# Patient Record
Sex: FEMALE | Race: WHITE | Employment: OTHER | URBAN - METROPOLITAN AREA
[De-identification: names, ages, dates, MRNs, and addresses within clinical notes are randomized per-mention and may not be internally consistent; named-entity substitution may affect disease eponyms.]

---

## 2016-12-05 LAB
ABS BASOPHILS: 0 CELLS/MM3
ABS EOSINOPHILS: 0.4 CELLS/MM3
ABS LYMPHOCYTES: 2.3 CELLS/MM3
ABS MONOCYTE: 0.7 CELLS/MM3
ABS NEUTROPHILS: 2.9 CELLS/MM3
ALBUMIN SERPL-MCNC: 3.2 G/DL
ALP SERPL-CCNC: 143 U/L
ALT SERPL-CCNC: 15 U/L
ANION GAP SERPL CALCULATED.3IONS-SCNC: 8 MMOL/L
AST SERPL-CCNC: 22 U/L
BASOPHILS NFR BLD AUTO: 0.6 %
BILIRUB SERPL-MCNC: 0.6 MG/DL
BUN SERPL-MCNC: 30 MG/DL
CALCIUM SERPL-MCNC: 9.2 MG/DL
CHLORIDE SERPLBLD-SCNC: 106 MMOL/L
CO2 SERPL-SCNC: 27 MMOL/L
CREAT SERPL-MCNC: 0.76 MG/DL
EOSINOPHIL NFR BLD AUTO: 6 %
ERYTHROCYTE [DISTWIDTH] IN BLOOD BY AUTOMATED COUNT: 13.8 %
GFR SERPL CREATININE-BSD FRML MDRD: 72 ML/MIN/1.73M2
GLUCOSE SERPL-MCNC: 78 MG/DL (ref 70–99)
HCT VFR BLD AUTO: 37.8 %
HEMOGLOBIN: 12.7 G/DL (ref 11.7–15.7)
LYMPHOCYTES NFR BLD AUTO: 36.3 %
MCH RBC QN AUTO: 30.7 PG
MCHC RBC AUTO-ENTMCNC: 33.6 G/DL
MCV RBC AUTO: 91 FL
MONOCYTES NFR BLD AUTO: 10.8 %
NEUTROPHILS NFR BLD AUTO: 46 %
PLATELET COUNT - QUEST: 238 10^9/L (ref 150–450)
POTASSIUM SERPL-SCNC: 4 MMOL/L
PROT SERPL-MCNC: 6.5 G/DL
RBC # BLD AUTO: 4.14 10^12/L
SODIUM SERPL-SCNC: 141 MMOL/L
VIT B12 SERPL-MCNC: 482 PG/ML
WBC # BLD AUTO: 6.3 10^9/L

## 2017-01-04 ENCOUNTER — DOCUMENTATION ONLY (OUTPATIENT)
Dept: OTHER | Facility: CLINIC | Age: 82
End: 2017-01-04

## 2017-01-04 DIAGNOSIS — Z71.89 ADVANCE CARE PLANNING: Primary | Chronic | ICD-10-CM

## 2017-01-09 ENCOUNTER — TRANSFERRED RECORDS (OUTPATIENT)
Dept: HEALTH INFORMATION MANAGEMENT | Facility: CLINIC | Age: 82
End: 2017-01-09

## 2017-01-10 ENCOUNTER — ASSISTED LIVING VISIT (OUTPATIENT)
Dept: GERIATRICS | Facility: CLINIC | Age: 82
End: 2017-01-10
Payer: MEDICARE

## 2017-01-10 VITALS
DIASTOLIC BLOOD PRESSURE: 82 MMHG | RESPIRATION RATE: 18 BRPM | BODY MASS INDEX: 19.51 KG/M2 | TEMPERATURE: 96.4 F | SYSTOLIC BLOOD PRESSURE: 138 MMHG | HEIGHT: 62 IN | HEART RATE: 83 BPM | WEIGHT: 106 LBS

## 2017-01-10 DIAGNOSIS — S72.091A: Primary | ICD-10-CM

## 2017-01-10 DIAGNOSIS — F03.C0 SEVERE DEMENTIA (H): ICD-10-CM

## 2017-01-10 DIAGNOSIS — M25.551 HIP PAIN, RIGHT: ICD-10-CM

## 2017-01-10 DIAGNOSIS — S32.9XXA PELVIC FRACTURE (H): ICD-10-CM

## 2017-01-10 PROCEDURE — 99207 ZZC CDG-DX INCORRECT: CPT | Performed by: NURSE PRACTITIONER

## 2017-01-10 NOTE — PROGRESS NOTES
[facility]    Chief Complaint   Patient presents with     Fracture       HPI:    April Donnelly is a 86 year old  (1/30/1930), who is being seen today for an episodic care visit at [facility]. Today's concern is:  1. Comminuted fracture of hip, right, closed, initial encounter (H)    2. Pelvic fracture (H)    3. Hip pain, right    4. Severe dementia    April is a frail 87 y/o woman who scooted out of her wheelchair a few days ago landing on her bottom.  She did not fall over, she fell in a sitting position.  She has had a non-displaced right pelvic fx for over a month and has not been able to walk due to her fear of falling.  Today she called out in pain during her transfer which up until today this has not been the problem.       ALLERGIES: Hydrocodone; Lisinopril; Metoprolol; and Terazosin  Past Medical, Surgical, Family and Social History reviewed and updated in [facility].    Current Outpatient Prescriptions   Medication Sig Dispense Refill     traMADol (ULTRAM) 50 MG tablet Take 50 mg by mouth At Bedtime       aspirin 81 MG EC tablet Take 1 tablet (81 mg) by mouth daily 31 tablet PRN     atenolol (TENORMIN) 50 MG tablet Take 1 tablet (50 mg) by mouth daily 31 tablet PRN     levothyroxine (LEVOTHROID) 75 MCG tablet Take 1 tablet (75 mcg) by mouth daily 31 tablet PRN     Citalopram Hydrobromide (CELEXA PO) Take 10 mg by mouth daily       LOSARTAN POTASSIUM PO Take 50 mg by mouth daily       Acetaminophen (TYLENOL PO) Take 650 mg by mouth every 6 hours as needed for mild pain or fever       traMADol (ULTRAM) 50 MG tablet Take 1 tablet (50 mg) by mouth every 6 hours as needed for pain 20 tablet 0     Medications reviewed:  Medications reconciled to facility chart and changes were made to reflect current medications as identified as above med list. Below are the changes that were made:   Medications stopped since last EPIC medication reconciliation:   There are no discontinued medications.    Medications  "started since last EPIC medication reconciliation:  No orders of the defined types were placed in this encounter.      REVIEW OF SYSTEMS:  Unobtainable secondary to cognitive impairment but today pt reports \"I'm feeling pretty good today.\"    Physical Exam:  /82 mmHg  Pulse 83  Temp(Src) 96.4  F (35.8  C)  Resp 18  Ht 5' 2\" (1.575 m)  Wt 106 lb (48.081 kg)  BMI 19.38 kg/m2  GENERAL APPEARANCE:  Alert, in no distress  RESP:  respiratory effort and palpation of chest normal, lungs clear to auscultation , no respiratory distress  CV:  Palpation and auscultation of heart done , regular rate and rhythm, no murmur, rub, or gallop  ABDOMEN:  normal bowel sounds, soft, nontender, no hepatosplenomegaly or other masses  M/S:   Gait and station abnormal wheelchair, unable to bear weight on the right  Digits and nails abnormal hypertrophic  Examination of:   right upper extremity, left upper extremity, right lower extremity and left lower extremity  Inspection, ROM, stability and muscle strength all large joint limited ROM due to arthritic changes, right hip limited by pain.  PSYCH:  insight and judgement impaired, memory impaired     Recent Labs:      Last Basic Metabolic Panel:  NA      141   12/5/2016   POTASSIUM      4.0   12/5/2016  CHLORIDE      106   12/5/2016  BOLA      9.2   12/5/2016  CO2       27   12/5/2016  BUN       30   12/5/2016  CR     0.76   12/5/2016  GLC       78   12/5/2016    WBC      6.3   12/5/2016  RBC     4.14   12/5/2016  HGB     12.7   12/5/2016  HCT     37.8   12/5/2016  MCV       91   12/5/2016  MCH     30.7   12/5/2016  MCHC     33.6   12/5/2016  RDW     13.8   12/5/2016  PLT      238   12/5/2016      Vitamin B12   482  12/5/16      Assessment/Plan:  (S72.091A) Comminuted fracture of hip, right, closed, initial encounter (H)  (primary encounter diagnosis)  (S32.9XXA) Pelvic fracture (H)  (M25.551) Hip pain, right  Comment: reviewed xrays from a couple of months ago to this most recent " one [place] has deteriorated   Plan: non-weight bearing, will add Tramadol for pain management.      (F03.90) Severe dementia  Comment: progressive, verbal deficit noted  Plan: DNR/DNI        Remains appropriate for a memory care [facility]    Time  25 min for visit and review of xrays    Electronically signed by  HIGINIO Tilley CNP

## 2017-01-11 RX ORDER — TRAMADOL HYDROCHLORIDE 50 MG/1
50 TABLET ORAL AT BEDTIME
COMMUNITY
Start: 2017-01-10 | End: 2017-01-24

## 2017-01-23 ENCOUNTER — ASSISTED LIVING VISIT (OUTPATIENT)
Dept: FAMILY MEDICINE | Facility: CLINIC | Age: 82
End: 2017-01-23
Payer: MEDICARE

## 2017-01-23 VITALS
RESPIRATION RATE: 18 BRPM | DIASTOLIC BLOOD PRESSURE: 82 MMHG | SYSTOLIC BLOOD PRESSURE: 137 MMHG | HEART RATE: 76 BPM | BODY MASS INDEX: 18.99 KG/M2 | HEIGHT: 62 IN | WEIGHT: 103.2 LBS

## 2017-01-23 DIAGNOSIS — S32.9XXD CLOSED NONDISPLACED FRACTURE OF PELVIS WITH ROUTINE HEALING, UNSPECIFIED PART OF PELVIS, SUBSEQUENT ENCOUNTER: ICD-10-CM

## 2017-01-23 DIAGNOSIS — F03.C0 SEVERE DEMENTIA (H): ICD-10-CM

## 2017-01-23 DIAGNOSIS — E03.9 HYPOTHYROIDISM, UNSPECIFIED TYPE: ICD-10-CM

## 2017-01-23 DIAGNOSIS — I63.9 CEREBROVASCULAR ACCIDENT (CVA), UNSPECIFIED MECHANISM (H): Primary | ICD-10-CM

## 2017-03-13 ENCOUNTER — ASSISTED LIVING VISIT (OUTPATIENT)
Dept: GERIATRICS | Facility: CLINIC | Age: 82
End: 2017-03-13
Payer: MEDICARE

## 2017-03-13 VITALS
SYSTOLIC BLOOD PRESSURE: 144 MMHG | HEART RATE: 61 BPM | HEIGHT: 62 IN | WEIGHT: 105 LBS | RESPIRATION RATE: 20 BRPM | DIASTOLIC BLOOD PRESSURE: 86 MMHG | TEMPERATURE: 94.8 F | BODY MASS INDEX: 19.32 KG/M2

## 2017-03-13 DIAGNOSIS — I61.9 HEMORRHAGIC STROKE (H): ICD-10-CM

## 2017-03-13 DIAGNOSIS — R55 SYNCOPE, UNSPECIFIED SYNCOPE TYPE: ICD-10-CM

## 2017-03-13 DIAGNOSIS — E03.9 ACQUIRED HYPOTHYROIDISM: ICD-10-CM

## 2017-03-13 DIAGNOSIS — F43.23 ADJUSTMENT DISORDER WITH MIXED ANXIETY AND DEPRESSED MOOD: ICD-10-CM

## 2017-03-13 DIAGNOSIS — I63.9 CEREBROVASCULAR ACCIDENT (CVA), UNSPECIFIED MECHANISM (H): Primary | ICD-10-CM

## 2017-03-13 DIAGNOSIS — F01.50 VASCULAR DEMENTIA WITHOUT BEHAVIORAL DISTURBANCE (H): ICD-10-CM

## 2017-03-13 NOTE — PROGRESS NOTES
[facility]    Chief Complaint   Patient presents with     Chronic Disease Management       HPI:    April Donnelly is a 87 year old  (1/30/1930), who is being seen today for an episodic care visit at [facility]. Today's concern is:    Cerebrovascular accident (CVA), unspecified mechanism (H)  Hemorrhagic stroke (H)  Syncope, unspecified syncope type  No recent falls reported but has had multiple falls since admissions to this facility in 11/2016  and a history of syncope. She has ongoing weakness and poor spatial planning. Today she was seen sitting in her wheelchair, she denies pain and is interested in conversation. Speech at baseline is slurred with run on sentence pattern.    Adjustment disorder with mixed anxiety and depressed mood  Ongoing with history of CVA. Current management includes Celexa 10 mg daily.    Severe Vascular dementia  Admitted to this facility from [facility] for progressing disease and medical management. Today she was pleasant and calm. Conservation is nonsensical at times.   a  Hypothyroidism, acquired    Goiter requiring surgery 30 years ago. Levothyroxine 75 mcg daily.       ALLERGIES: Hydrocodone; Lisinopril; Metoprolol; and Terazosin  Past Medical, Surgical, Family and Social History reviewed and updated in [facility].    Current Outpatient Prescriptions   Medication Sig Dispense Refill     aspirin 81 MG EC tablet Take 1 tablet (81 mg) by mouth daily 31 tablet PRN     atenolol (TENORMIN) 50 MG tablet Take 1 tablet (50 mg) by mouth daily 31 tablet PRN     levothyroxine (LEVOTHROID) 75 MCG tablet Take 1 tablet (75 mcg) by mouth daily 31 tablet PRN     Citalopram Hydrobromide (CELEXA PO) Take 10 mg by mouth daily       LOSARTAN POTASSIUM PO Take 50 mg by mouth daily       Acetaminophen (TYLENOL PO) Take 650 mg by mouth every 6 hours as needed for mild pain or fever       traMADol (ULTRAM) 50 MG tablet Take 1 tablet (50 mg) by mouth every 6 hours as needed for  "pain 20 tablet 0     Patient Active Problem List   Diagnosis     Cerebrovascular accident (H)     Hemorrhagic stroke (H)     Syncope     Adjustment disorder with mixed anxiety and depressed mood     Hypertensive heart disease without heart failure     Advance care planning     Closed nondisplaced fracture of pelvis with routine healing, unspecified part of pelvis, subsequent encounter     Vascular dementia without behavioral disturbance     Acquired hypothyroidism     Medications reviewed: yes  Medications reconciled to facility chart and changes were made to reflect current medications as identified as above med list. Below are the changes that were made: none  Medications stopped since last EPIC medication reconciliation: none  There are no discontinued medications.    Medications started since last [facility] medication reconciliation:  No orders of the defined types were placed in this encounter.    REVIEW OF SYSTEMS:  Unobtainable secondary to cognitive impairment or aphasia.    Physical Exam:  /86  Pulse 61  Temp 94.8  F (34.9  C)  Resp 20  Ht 5' 2\" (1.575 m)  Wt 105 lb (47.6 kg)  BMI 19.2 kg/m2  GENERAL APPEARANCE:  Alert, in no distress, Able to communicate but word finding difficultly   ENT:  Mouth and posterior oropharynx normal, moist mucous membranes  EYES:  EOM, conjunctivae, lids, pupils and irises normal  NECK:  No adenopathy,masses or thyromegaly  RESP:  respiratory effort and palpation of chest normal, lungs clear to auscultation   CV:  Palpation and auscultation of heart done , regular rate and rhythm, no murmur, rub, or gallop, no edema  ABDOMEN:  normal bowel sounds, soft, nontender, no hepatosplenomegaly or other masses  M/S:   Gait and station abnormal wheelchair is baseline. Left had closed fist able to open and move. Gait and station normal with wheelchair at baseline  SKIN:  Inspection of skin and subcutaneous tissue baseline  NEURO:   Cranial nerves 2-12 are normal tested and grossly at " patient's baseline  PSYCH:  insight and judgement impaired, affect and mood normal    Recent Labs:      Last Basic Metabolic Panel:  Lab Results   Component Value Date     12/05/2016      Lab Results   Component Value Date    POTASSIUM 4.0 12/05/2016     Lab Results   Component Value Date    CHLORIDE 106 12/05/2016     Lab Results   Component Value Date    BOLA 9.2 12/05/2016     Lab Results   Component Value Date    CO2 27 12/05/2016     Lab Results   Component Value Date    BUN 30 12/05/2016     Lab Results   Component Value Date    CR 0.76 12/05/2016     Lab Results   Component Value Date    GLC 78 12/05/2016     Lab Results   Component Value Date    WBC 6.3 12/05/2016     Lab Results   Component Value Date    RBC 4.14 12/05/2016     Lab Results   Component Value Date    HGB 12.7 12/05/2016     Lab Results   Component Value Date    HCT 37.8 12/05/2016     Lab Results   Component Value Date    MCV 91 12/05/2016     Lab Results   Component Value Date    MCH 30.7 12/05/2016     Lab Results   Component Value Date    MCHC 33.6 12/05/2016     Lab Results   Component Value Date    RDW 13.8 12/05/2016     Lab Results   Component Value Date     12/05/2016     Assessment/Plan:  (I63.9) Cerebrovascular accident (CVA), unspecified mechanism (H)  (primary encounter diagnosis)  (I61.9) Hemorrhagic stroke (H)  (R55) Syncope, unspecified syncope type  Comment: Chronic   Plan: Continue with ASA, Atenolol and Losartan medication management. Continue to monitor labs per facility or additionally with acute illness or change in condition.    (F43.23) Adjustment disorder with mixed anxiety and depressed mood  Comment: Ongoing and Chronic  Plan: Continue Celexa 10 mg daily.    (F03.90) Severe Vascular Dementia without behavior  Comment: Chronic   Plan: Continue supportive care. Monitor for changes in mood and behavior.    (E03.9) Hypothyroidism, unspecified type  Comment: Chronic  Plan: Continue current plan of care.  Levothyroxine 75 mg daily. Monitor annually -  TSH.      Electronically signed by  HIGINIO Tilley CNP, NP Intern

## 2017-04-21 ENCOUNTER — ASSISTED LIVING VISIT (OUTPATIENT)
Dept: GERIATRICS | Facility: CLINIC | Age: 82
End: 2017-04-21
Payer: MEDICARE

## 2017-04-21 VITALS
BODY MASS INDEX: 19.56 KG/M2 | TEMPERATURE: 97.6 F | HEIGHT: 62 IN | SYSTOLIC BLOOD PRESSURE: 107 MMHG | DIASTOLIC BLOOD PRESSURE: 58 MMHG | RESPIRATION RATE: 15 BRPM | WEIGHT: 106.3 LBS | HEART RATE: 60 BPM

## 2017-04-21 DIAGNOSIS — I63.9 CEREBROVASCULAR ACCIDENT (CVA), UNSPECIFIED MECHANISM (H): Primary | ICD-10-CM

## 2017-04-21 DIAGNOSIS — E03.9 ACQUIRED HYPOTHYROIDISM: ICD-10-CM

## 2017-04-21 DIAGNOSIS — I61.9 HEMORRHAGIC STROKE (H): ICD-10-CM

## 2017-04-21 DIAGNOSIS — F01.50 VASCULAR DEMENTIA WITHOUT BEHAVIORAL DISTURBANCE (H): ICD-10-CM

## 2017-04-21 DIAGNOSIS — F43.23 ADJUSTMENT DISORDER WITH MIXED ANXIETY AND DEPRESSED MOOD: ICD-10-CM

## 2017-04-21 DIAGNOSIS — I11.9 HYPERTENSIVE HEART DISEASE WITHOUT HEART FAILURE: ICD-10-CM

## 2017-04-21 PROCEDURE — 99207 ZZC CDG-CORRECTLY CODED, REVIEWED AND AGREE: CPT | Performed by: NURSE PRACTITIONER

## 2017-04-21 NOTE — PROGRESS NOTES
[facility]    Chief Complaint   Patient presents with     RECHECK       HPI:    April Donnelly is a 87 year old  (1/30/1930), who is being seen today for an episodic care visit at [facility]. Today's concern is:     Cerebrovascular accident (CVA), unspecified mechanism (H)  Hemorrhagic stroke (H)  Has one fall in March on 3/11/17. Has ongoing weakness and poor spatial planning.  See today sitting in her wheelchair. Smiling and pleasant during visit. ASA 81 mg po daily.    Hypertensive heart disease without heart failure  BP ranges -107 DBP 95-86. Denies chest pain, shortness of breath, lightheadedness but  not an accurate historian. Cozaar 50 mg po daily and Atenolol 50 mg po daily.    Adjustment disorder with mixed anxiety and depressed mood  Celexa 10 mg po daily and no reports of issues from nursing.     Vascular dementia without behavioral disturbance  Nonsensical conversation today but happy and interested in visiting.    Acquired hypothyroidism  Levothroid 75 mcg po daily. Goiter requiring surgery 30 years ago.     ALLERGIES: Hydrocodone; Lisinopril; Metoprolol; and Terazosin  Past Medical, Surgical, Family and Social History reviewed and updated in [facility].    Current Outpatient Prescriptions   Medication Sig Dispense Refill     aspirin 81 MG EC tablet Take 1 tablet (81 mg) by mouth daily 31 tablet PRN     atenolol (TENORMIN) 50 MG tablet Take 1 tablet (50 mg) by mouth daily 31 tablet PRN     levothyroxine (LEVOTHROID) 75 MCG tablet Take 1 tablet (75 mcg) by mouth daily 31 tablet PRN     Citalopram Hydrobromide (CELEXA PO) Take 10 mg by mouth daily       LOSARTAN POTASSIUM PO Take 50 mg by mouth daily       Acetaminophen (TYLENOL PO) Take 650 mg by mouth every 6 hours as needed for mild pain or fever       traMADol (ULTRAM) 50 MG tablet Take 1 tablet (50 mg) by mouth every 6 hours as needed for pain 20 tablet 0     Medications reviewed:  Medications reconciled to facility chart and changes  "were made to reflect current medications as identified as above med list. Below are the changes that were made:   Medications stopped since last EPIC medication reconciliation:   There are no discontinued medications.    Medications started since last [facility] medication reconciliation:  No orders of the defined types were placed in this encounter.    REVIEW OF SYSTEMS:  Unobtainable secondary to cognitive impairment or aphasia, but today pt reports feeling good    Physical Exam:  /58  Pulse 60  Temp 97.6  F (36.4  C)  Resp 15  Ht 5' 2\" (1.575 m)  Wt 106 lb 4.8 oz (48.2 kg)  BMI 19.44 kg/m2  GENERAL APPEARANCE:  Alert, in no distress  ENT:  Mouth and posterior oropharynx normal, moist mucous membranes, [place]  EYES:  EOM, conjunctivae, lids, pupils and irises normal, EOM normal, conjunctiva and lids normal  NECK:  No adenopathy,masses or thyromegaly  RESP:  respiratory effort and palpation of chest normal, lungs clear to auscultation , no respiratory distress but diminished throughout  CV:  Palpation and auscultation of heart done , regular rate, rub, or gallop, grade 2/6 murmur  ABDOMEN:  normal bowel sounds, soft, nontender, no hepatosplenomegaly or other masses  M/S:   Gait and station abnormal -wheelchair is baseline  SKIN:  Inspection of skin and subcutaneous tissue baseline, Palpation of skin and subcutaneous tissue baseline  NEURO:   Cranial nerves 2-12 are normal tested and grossly at patient's baseline  PSYCH: pleasant and calm    Recent Labs:      CBC RESULTS:   Recent Labs   Lab Test 12/05/16   WBC  6.3   RBC  4.14   HGB  12.7   HCT  37.8   MCV  91   MCH  30.7   MCHC  33.6   RDW  13.8   PLT  238       Last Basic Metabolic Panel:  Recent Labs   Lab Test 12/05/16   NA  141   POTASSIUM  4.0   CHLORIDE  106   BOLA  9.2   CO2  27   BUN  30   CR  0.76   GLC  78       Liver Function Studies -   Recent Labs   Lab Test 12/05/16   PROTTOTAL  6.5   ALBUMIN  3.2   BILITOTAL  0.6   ALKPHOS  143   AST  22   ALT  15 "     Assessment/Plan:  (I63.9) Cerebrovascular accident (CVA), unspecified mechanism (H)  (primary encounter diagnosis)  (I61.9) Hemorrhagic stroke (H)  Comment: Chronic  Plan: Continue with ASA, Atenolol and Losartan, monitor with labs annual and with acute illness.    (I11.9) Hypertensive heart disease without heart failure  Comment: Chronic  Plan: Continue with current plan of care. BMP, CBC and LFT     (F43.23) Adjustment disorder with mixed anxiety and depressed mood  Comment: Chronic but stable  Plan: Celexa 10 mg po daily. Monitor for mood changes    (F01.50) Vascular dementia without behavioral disturbance  Comment: Chronic and ongoing  Plan: Continue with supportive care. Memory care is appropriate.    (E03.9) Acquired hypothyroidism  Comment: Chronic but stable  Plan: TSH next lab day.      Electronically signed by  HIGINIO Pena CNP

## 2017-04-26 ENCOUNTER — TRANSFERRED RECORDS (OUTPATIENT)
Dept: HEALTH INFORMATION MANAGEMENT | Facility: CLINIC | Age: 82
End: 2017-04-26

## 2017-04-26 LAB
ALBUMIN SERPL-MCNC: 3.1 G/DL (ref 3.4–5)
ALP SERPL-CCNC: 123 U/L (ref 40–150)
ALT SERPL-CCNC: 18 U/L (ref 0–50)
ANION GAP SERPL CALCULATED.3IONS-SCNC: 7 MMOL/L (ref 3–14)
AST SERPL-CCNC: 22 U/L (ref 0–45)
BILIRUB SERPL-MCNC: 0.4 MG/DL (ref 0.2–1.3)
BUN SERPL-MCNC: 30 MG/DL (ref 7–30)
CALCIUM SERPL-MCNC: 9.1 MG/DL (ref 8.5–10.1)
CHLORIDE SERPLBLD-SCNC: 106 MMOL/L (ref 94–109)
CO2 SERPL-SCNC: 27 MMOL/L (ref 20–32)
CREAT SERPL-MCNC: 0.65 MG/DL (ref 0.52–1.04)
ERYTHROCYTE [DISTWIDTH] IN BLOOD BY AUTOMATED COUNT: 13.3 % (ref 10–15)
GFR SERPL CREATININE-BSD FRML MDRD: 86 ML/MIN/1.73M2
GLUCOSE SERPL-MCNC: 75 MG/DL (ref 70–99)
HCT VFR BLD AUTO: 36.9 % (ref 35–47)
HEMOGLOBIN: 12.1 G/DL (ref 11.7–15.7)
MCH RBC QN AUTO: 30.8 PG (ref 26.5–33)
MCHC RBC AUTO-ENTMCNC: 32.8 G/DL (ref 31.5–36.5)
MCV RBC AUTO: 94 FL (ref 78–100)
PLATELET # BLD AUTO: 243 10E9/L (ref 150–450)
POTASSIUM SERPL-SCNC: 4.1 MMOL/L (ref 3.4–5.3)
PROT SERPL-MCNC: 6.2 G/DL (ref 6.8–8.8)
RBC # BLD AUTO: 3.93 10E12/L (ref 3.8–5.2)
SODIUM SERPL-SCNC: 140 MMOL/L (ref 133–144)
TSH SERPL-ACNC: 1.89 MU/L (ref 0.4–4)
WBC # BLD AUTO: 5 10E9/L (ref 4–11)

## 2017-08-08 ENCOUNTER — ASSISTED LIVING VISIT (OUTPATIENT)
Dept: GERIATRICS | Facility: CLINIC | Age: 82
End: 2017-08-08
Payer: MEDICARE

## 2017-08-08 VITALS
TEMPERATURE: 97.8 F | DIASTOLIC BLOOD PRESSURE: 75 MMHG | OXYGEN SATURATION: 97 % | SYSTOLIC BLOOD PRESSURE: 110 MMHG | HEART RATE: 55 BPM | RESPIRATION RATE: 16 BRPM

## 2017-08-08 DIAGNOSIS — F01.50 VASCULAR DEMENTIA WITHOUT BEHAVIORAL DISTURBANCE (H): ICD-10-CM

## 2017-08-08 DIAGNOSIS — I11.9 HYPERTENSIVE HEART DISEASE WITHOUT HEART FAILURE: Primary | ICD-10-CM

## 2017-08-08 NOTE — PROGRESS NOTES
[place]    Chief Complaint   Patient presents with     RECHECK       HPI:    April Donnelly is a 87 year old  (1/30/1930), who is being seen today for an episodic care visit at [facility].  HPI information obtained from: facility chart records.Today's concern is:    Hypertensive heart disease without heart failure  Hx of elevated BP but last month and continuing today with lower heart rate and blood pressures. Unable or unwilling to report how she is feeling.    Vascular dementia without behavioral disturbance  Resides on memory care unit with advancing needs including assist at times with feeding and transfers.       ALLERGIES: Hydrocodone; Lisinopril; Metoprolol; and Terazosin  Past Medical, Surgical, Family and Social History reviewed and updated in [facility].    Current Outpatient Prescriptions   Medication Sig Dispense Refill     aspirin 81 MG EC tablet Take 1 tablet (81 mg) by mouth daily 31 tablet PRN     atenolol (TENORMIN) 50 MG tablet Take 1 tablet (50 mg) by mouth daily (Patient taking differently: Take 25 mg by mouth daily ) 31 tablet PRN     levothyroxine (LEVOTHROID) 75 MCG tablet Take 1 tablet (75 mcg) by mouth daily 31 tablet PRN     Citalopram Hydrobromide (CELEXA PO) Take 10 mg by mouth daily       LOSARTAN POTASSIUM PO Take 50 mg by mouth daily       Acetaminophen (TYLENOL PO) Take 650 mg by mouth every 6 hours as needed for mild pain or fever       traMADol (ULTRAM) 50 MG tablet Take 1 tablet (50 mg) by mouth every 6 hours as needed for pain 20 tablet 0     Patient Active Problem List   Diagnosis     Cerebrovascular accident (H)     Hemorrhagic stroke (H)     Syncope     Adjustment disorder with mixed anxiety and depressed mood     Hypertensive heart disease without heart failure     Advance care planning     Closed nondisplaced fracture of pelvis with routine healing, unspecified part of pelvis, subsequent encounter     Vascular dementia without behavioral disturbance      Acquired hypothyroidism       Medications reviewed:  Medications reconciled to facility chart and changes were made to reflect current medications as identified as above med list. Below are the changes that were made:   Medications stopped since last EPIC medication reconciliation:   There are no discontinued medications.    Medications started since last [facility] medication reconciliation:  No orders of the defined types were placed in this encounter.    REVIEW OF SYSTEMS:  Unobtainable secondary to cognitive impairment or aphasia.    Physical Exam:  /75  Pulse 55  Temp 97.8  F (36.6  C)  Resp 16  SpO2 97%  GENERAL APPEARANCE:  Alert, in no distress, sitting in wheelchair  ENT:  Mouth and posterior oropharynx normal, moist mucous membranes, [place] with hearing aid in right ear only  EYES:  EOM, conjunctivae, lids, pupils and irises normal, EOM normal, conjunctiva and lids normal  NECK:  No adenopathy,masses or thyromegaly  RESP:  respiratory effort and palpation of chest normal, lungs clear to auscultation , no respiratory distress but diminished throughout  CV:  Palpation and auscultation of heart done , regular rate, rub, or gallop, grade 2/6 murmur, no lower extremity edema  ABDOMEN:  normal bowel sounds, soft, nontender, no hepatosplenomegaly or other M/S:   Gait and station abnormal -wheelchair is baseline  SKIN:  Inspection of skin and subcutaneous tissue baseline, Palpation of skin and subcutaneous tissue baseline  NEURO:   Cranial nerves 2-12 are normal tested and grossly at patient's baseline  PSYCH: pleasant and calm     Recent Labs:    CBC RESULTS:   Recent Labs   Lab Test 04/26/17 12/05/16   WBC  5.0  6.3   RBC  3.93  4.14   HGB  12.1  12.7   HCT  36.9  37.8   MCV  94  91   MCH  30.8  30.7   MCHC  32.8  33.6   RDW  13.3  13.8   PLT  243  238       Last Basic Metabolic Panel:  Recent Labs   Lab Test 04/26/17 12/05/16   NA  140  141   POTASSIUM  4.1  4.0   CHLORIDE  106  106   BOLA  9.1  9.2   CO2   27  27   BUN  30  30   CR  0.65  0.76   GLC  75  78       Liver Function Studies -   Recent Labs   Lab Test 04/26/17 12/05/16   PROTTOTAL  6.2*  6.5   ALBUMIN  3.1*  3.2   BILITOTAL  0.4  0.6   ALKPHOS  123  143   AST  22  22   ALT  18  15       TSH   Date Value Ref Range Status   04/26/2017 1.89 0.40 - 4.00 mU/L Final       Assessment/Plan:  (I11.9) Hypertensive heart disease without heart failure  (primary encounter diagnosis)  Comment: Chronic but tightly controlled  Plan:   -Decrease Atenolol to 25 mg po daily  -VS with visits, per facility protocol and with acute changes    (F01.50) Vascular dementia without behavioral disturbance  Comment: Ongoing and advancing  Plan:   -Supportive care on memory unit  -Monitor as needs increase    Family Communication: Left voice mail message for son. Unable to reach but will ask nursing to update family on medication dose change.      Electronically signed by  HIGINIO Pena CNP

## 2017-08-29 ENCOUNTER — ASSISTED LIVING VISIT (OUTPATIENT)
Dept: GERIATRICS | Facility: CLINIC | Age: 82
End: 2017-08-29
Payer: MEDICARE

## 2017-08-29 VITALS
TEMPERATURE: 97.8 F | SYSTOLIC BLOOD PRESSURE: 133 MMHG | HEART RATE: 62 BPM | RESPIRATION RATE: 16 BRPM | DIASTOLIC BLOOD PRESSURE: 80 MMHG

## 2017-08-29 DIAGNOSIS — R21 RASH AND NONSPECIFIC SKIN ERUPTION: Primary | ICD-10-CM

## 2017-08-29 RX ORDER — HYDROCORTISONE 2.5 %
CREAM (GRAM) TOPICAL DAILY
Refills: 0 | COMMUNITY
Start: 2017-08-29 | End: 2017-09-26 | Stop reason: ALTCHOICE

## 2017-08-29 NOTE — PROGRESS NOTES
[place] [facility]    Chief Complaint   Patient presents with     RECHECK       HPI:    April Donnelly is a 87 year old  (1/30/1930), who is being seen today for an episodic care visit at [facility].  HPI information obtained from: facility chart records.Today's concern is:    Rash and nonspecific skin eruption  Staff reports areas of itch on residents upper back     ALLERGIES: Hydrocodone; Lisinopril; Metoprolol; and Terazosin  Past Medical, Surgical, Family and Social History reviewed and updated in [facility].    Current Outpatient Prescriptions   Medication Sig Dispense Refill     ATENOLOL PO Take 25 mg by mouth daily       hydrocortisone 2.5 % cream Apply topically daily Daily for one week and then prn  0     aspirin 81 MG EC tablet Take 1 tablet (81 mg) by mouth daily 31 tablet PRN     levothyroxine (LEVOTHROID) 75 MCG tablet Take 1 tablet (75 mcg) by mouth daily 31 tablet PRN     Citalopram Hydrobromide (CELEXA PO) Take 10 mg by mouth daily       LOSARTAN POTASSIUM PO Take 50 mg by mouth daily       Acetaminophen (TYLENOL PO) Take 650 mg by mouth every 6 hours as needed for mild pain or fever       Patient Active Problem List   Diagnosis     Cerebrovascular accident (H)     Hemorrhagic stroke (H)     Syncope     Adjustment disorder with mixed anxiety and depressed mood     Hypertensive heart disease without heart failure     Advance care planning     Closed nondisplaced fracture of pelvis with routine healing, unspecified part of pelvis, subsequent encounter     Vascular dementia without behavioral disturbance     Acquired hypothyroidism       Medications reviewed:  Medications reconciled to facility chart and changes were made to reflect current medications as identified as above med list. Below are the changes that were made:   Medications stopped since last EPIC medication reconciliation:   Medications Discontinued During This Encounter   Medication Reason      atenolol (TENORMIN) 50 MG tablet Medication Reconciliation Clean Up     traMADol (ULTRAM) 50 MG tablet Medication Reconciliation Clean Up     Medications started since last EPIC medication reconciliation:  Orders Placed This Encounter   Medications     ATENOLOL PO     Sig: Take 25 mg by mouth daily     REVIEW OF SYSTEMS:  Unobtainable secondary to cognitive impairment or aphasia.    Physical Exam:  /80  Pulse 62  Temp 97.8  F (36.6  C)  Resp 16  GENERAL APPEARANCE:  Alert, in no distress, sitting in wheelchair  ENT:  Mouth and posterior oropharynx normal, moist mucous membranes, [place]  EYES:  EOM, conjunctivae, lids, pupils and irises normal  NECK:  No adenopathy,masses or thyromegaly  RESP:  respiratory effort and palpation of chest normal, lungs clear to auscultation   CV:  Palpation and auscultation of heart done , no edema, rate-normal, grade 2/6 murmur  ABDOMEN:  normal bowel sounds, soft, nontender, no hepatosplenomegaly or other masses  M/S:   wheelchair is baseline  SKIN:  reddened areas on back, scratch marks  NEURO:   Examination of sensation by touch normal  PSYCH:  insight and judgement impaired, memory impaired     Recent Labs:    CBC RESULTS:   Recent Labs   Lab Test 04/26/17 12/05/16   WBC  5.0  6.3   RBC  3.93  4.14   HGB  12.1  12.7   HCT  36.9  37.8   MCV  94  91   MCH  30.8  30.7   MCHC  32.8  33.6   RDW  13.3  13.8   PLT  243  238       Last Basic Metabolic Panel:  Recent Labs   Lab Test 04/26/17 12/05/16   NA  140  141   POTASSIUM  4.1  4.0   CHLORIDE  106  106   BOLA  9.1  9.2   CO2  27  27   BUN  30  30   CR  0.65  0.76   GLC  75  78       Liver Function Studies -   Recent Labs   Lab Test 04/26/17 12/05/16   PROTTOTAL  6.2*  6.5   ALBUMIN  3.1*  3.2   BILITOTAL  0.4  0.6   ALKPHOS  123  143   AST  22  22   ALT  18  15       TSH   Date Value Ref Range Status   04/26/2017 1.89 0.40 - 4.00 mU/L Final       Assessment/Plan:  (R21) Rash and nonspecific skin eruption  (primary encounter  diagnosis)  Comment: Acute  Plan:   -Hydrocortisone 2.5% cream to rash area for one week and then prn  -Staff to monitor for effectiveness        Electronically signed by  HIGINIO Pena CNP

## 2017-09-18 DIAGNOSIS — R21 RASH: Primary | ICD-10-CM

## 2017-09-26 ENCOUNTER — ASSISTED LIVING VISIT (OUTPATIENT)
Dept: GERIATRICS | Facility: CLINIC | Age: 82
End: 2017-09-26
Payer: MEDICARE

## 2017-09-26 VITALS
DIASTOLIC BLOOD PRESSURE: 75 MMHG | TEMPERATURE: 98 F | OXYGEN SATURATION: 98 % | SYSTOLIC BLOOD PRESSURE: 101 MMHG | HEART RATE: 60 BPM | RESPIRATION RATE: 16 BRPM

## 2017-09-26 DIAGNOSIS — R21 RASH AND NONSPECIFIC SKIN ERUPTION: Primary | ICD-10-CM

## 2017-09-26 RX ORDER — HYDROCORTISONE BUTYRATE 0.1 %
CREAM (GRAM) TOPICAL 2 TIMES DAILY
COMMUNITY
Start: 2017-09-26 | End: 2017-10-17

## 2017-09-26 NOTE — PROGRESS NOTES
[place] [facility]    Chief Complaint   Patient presents with     RECHECK       HPI:    April Donnelly is a 87 year old  (1/30/1930), who is being seen today for an episodic care visit at [facility].  HPI information obtained from: facility chart records.Today's concern is:    Rash and nonspecific skin eruption  Skin rash continues and current treatment is ineffective. Resident continues to itch skin eruption on chest. No lesions seen on legs or arms. She is afebrile. She shows no non-verbal indications of pain. Discrete red-brown itchy papules are clinical presentation.      ALLERGIES: Hydrocodone; Lisinopril; Metoprolol; and Terazosin  Past Medical, Surgical, Family and Social History reviewed and updated in [facility].    Current Outpatient Prescriptions   Medication Sig Dispense Refill     hydrocortisone butyrate 0.1 % CREA Externally apply topically 2 times daily To affected areas       ATENOLOL PO Take 25 mg by mouth daily       aspirin 81 MG EC tablet Take 1 tablet (81 mg) by mouth daily 31 tablet PRN     levothyroxine (LEVOTHROID) 75 MCG tablet Take 1 tablet (75 mcg) by mouth daily 31 tablet PRN     Citalopram Hydrobromide (CELEXA PO) Take 10 mg by mouth daily       LOSARTAN POTASSIUM PO Take 50 mg by mouth daily       Acetaminophen (TYLENOL PO) Take 650 mg by mouth every 6 hours as needed for mild pain or fever       Patient Active Problem List   Diagnosis     Cerebrovascular accident (H)     Hemorrhagic stroke (H)     Syncope     Adjustment disorder with mixed anxiety and depressed mood     Hypertensive heart disease without heart failure     Advance care planning     Closed nondisplaced fracture of pelvis with routine healing, unspecified part of pelvis, subsequent encounter     Vascular dementia without behavioral disturbance     Acquired hypothyroidism       Medications reviewed:  Medications reconciled to facility chart and changes were made to reflect current medications as identified as  above med list. Below are the changes that were made:   Medications stopped since last EPIC medication reconciliation:   There are no discontinued medications.    Medications started since last [facility] medication reconciliation:  No orders of the defined types were placed in this encounter.    REVIEW OF SYSTEMS:  Unobtainable secondary to cognitive impairment or aphasia.    Physical Exam:  /75  Pulse 60  Temp 98  F (36.7  C)  Resp 16  SpO2 98%  GENERAL APPEARANCE:  Alert, in no distress, sitting in wheelchair  ENT:  Mouth and posterior oropharynx normal, moist mucous membranes, [place]  EYES:  EOM, conjunctivae, lids, pupils and irises normal  NECK:  No adenopathy,masses or thyromegaly  RESP:  respiratory effort and palpation of chest normal, lungs clear to auscultation   CV:  Palpation and auscultation of heart done , no edema, rate-normal, grade 2/6 murmur  ABDOMEN:  normal bowel sounds, soft, nontender, no hepatosplenomegaly or other masses  M/S:   wheelchair is baseline  SKIN:  See HPI above  NEURO:   Examination of sensation by touch normal  PSYCH:  insight and judgement impaired, memory impaired     Recent Labs:    CBC RESULTS:   Recent Labs   Lab Test 04/26/17 12/05/16   WBC  5.0  6.3   RBC  3.93  4.14   HGB  12.1  12.7   HCT  36.9  37.8   MCV  94  91   MCH  30.8  30.7   MCHC  32.8  33.6   RDW  13.3  13.8   PLT  243  238       Last Basic Metabolic Panel:  Recent Labs   Lab Test 04/26/17 12/05/16   NA  140  141   POTASSIUM  4.1  4.0   CHLORIDE  106  106   BOLA  9.1  9.2   CO2  27  27   BUN  30  30   CR  0.65  0.76   GLC  75  78       Liver Function Studies -   Recent Labs   Lab Test 04/26/17 12/05/16   PROTTOTAL  6.2*  6.5   ALBUMIN  3.1*  3.2   BILITOTAL  0.4  0.6   ALKPHOS  123  143   AST  22  22   ALT  18  15       TSH   Date Value Ref Range Status   04/26/2017 1.89 0.40 - 4.00 mU/L Final       Assessment/Plan:  (R21) Rash and nonspecific skin eruption  (primary encounter diagnosis)  Comment: Acute  Plan:    -Hydrocortisone butyrate 0.1% to affected areas BID x 7 days  -Monitor for effectiveness  -Consider systemic treatment         Electronically signed by  HIGINIO Pena CNP

## 2017-09-28 RX ORDER — HYDROCORTISONE 2.5 %
CREAM (GRAM) TOPICAL
Qty: 30 G | Refills: 98 | Status: SHIPPED | OUTPATIENT
Start: 2017-09-28 | End: 2018-05-10

## 2017-10-17 ENCOUNTER — ASSISTED LIVING VISIT (OUTPATIENT)
Dept: GERIATRICS | Facility: CLINIC | Age: 82
End: 2017-10-17
Payer: MEDICARE

## 2017-10-17 VITALS
DIASTOLIC BLOOD PRESSURE: 80 MMHG | HEART RATE: 72 BPM | RESPIRATION RATE: 16 BRPM | TEMPERATURE: 97.5 F | SYSTOLIC BLOOD PRESSURE: 115 MMHG | OXYGEN SATURATION: 90 %

## 2017-10-17 DIAGNOSIS — R21 RASH AND NONSPECIFIC SKIN ERUPTION: Primary | ICD-10-CM

## 2017-10-17 NOTE — PROGRESS NOTES
[place] [facility]    Chief Complaint   Patient presents with     RECHECK       HPI:    April Donnelly is a 87 year old  (1/30/1930), who is being seen today for an episodic care visit at [facility].  HPI information obtained from: facility chart records.Today's concern is:    Rash and nonspecific skin eruption  Continues to have nonspecific pruritus skin rash covering areas of posterior torso, lesion on face, buttocks and upper chest. Seen today scratching her head as well. Not reported in other residents. She continues to be afebrile, show no indications of pain but positive for itch. Papules are discrete re-brown, some lesions are open with scratching.       ALLERGIES: Hydrocodone; Lisinopril; Metoprolol; and Terazosin  Past Medical, Surgical, Family and Social History reviewed and updated in [facility].    Current Outpatient Prescriptions   Medication Sig Dispense Refill     hydrocortisone butyrate 0.1 % CREA Apply to reddened excoriated areas       PREDNISONE PO Take 40 mg by mouth Prednisone 40 mg po x 5 days then 30 mg po x 4 days then 20 mg po x 3 days then 10 mg po x 2 days then discontinue.       hydrocortisone 2.5 % cream Ap top to reddened area prn 30 g 98     ATENOLOL PO Take 25 mg by mouth daily       aspirin 81 MG EC tablet Take 1 tablet (81 mg) by mouth daily 31 tablet PRN     levothyroxine (LEVOTHROID) 75 MCG tablet Take 1 tablet (75 mcg) by mouth daily 31 tablet PRN     Citalopram Hydrobromide (CELEXA PO) Take 10 mg by mouth daily       LOSARTAN POTASSIUM PO Take 50 mg by mouth daily       Acetaminophen (TYLENOL PO) Take 650 mg by mouth every 6 hours as needed for mild pain or fever       Medications reviewed:  Medications reconciled to facility chart and changes were made to reflect current medications as identified as above med list. Below are the changes that were made:   Medications stopped since last EPIC medication reconciliation:   Medications Discontinued During This Encounter    Medication Reason     hydrocortisone butyrate 0.1 % CREA Medication Reconciliation Clean Up     Patient Active Problem List   Diagnosis     Cerebrovascular accident (H)     Hemorrhagic stroke (H)     Syncope     Adjustment disorder with mixed anxiety and depressed mood     Hypertensive heart disease without heart failure     Advance care planning     Closed nondisplaced fracture of pelvis with routine healing, unspecified part of pelvis, subsequent encounter     Vascular dementia without behavioral disturbance     Acquired hypothyroidism     Medications started since last EPIC medication reconciliation:  No orders of the defined types were placed in this encounter.    REVIEW OF SYSTEMS:  Unobtainable secondary to cognitive impairment or aphasia.    Physical Exam:  /80  Pulse 72  Temp 97.5  F (36.4  C)  Resp 16  SpO2 90%  GENERAL APPEARANCE:  Alert, in no distress  ENT:  Mouth and posterior oropharynx normal, moist mucous membranes, [place]  EYES:  EOM, conjunctivae, lids, pupils and irises normal  NECK:  No adenopathy,masses or thyromegaly  RESP:  respiratory effort and palpation of chest normal, lungs clear to auscultation   CV:  Palpation and auscultation of heart done , no edema, rate-normal, grade 2/6 murmur, irregular rhythm   ABDOMEN:  normal bowel sounds, soft, nontender, no hepatosplenomegaly or other masses  M/S:   wheelchair is baseline  SKIN:  See HPI above  NEURO:   Examination of sensation by touch normal  PSYCH:  insight and judgement impaired, memory impaired     Recent Labs:    CBC RESULTS:   Recent Labs   Lab Test 04/26/17 12/05/16   WBC  5.0  6.3   RBC  3.93  4.14   HGB  12.1  12.7   HCT  36.9  37.8   MCV  94  91   MCH  30.8  30.7   MCHC  32.8  33.6   RDW  13.3  13.8   PLT  243  238       Last Basic Metabolic Panel:  Recent Labs   Lab Test 04/26/17 12/05/16   NA  140  141   POTASSIUM  4.1  4.0   CHLORIDE  106  106   BOLA  9.1  9.2   CO2  27  27   BUN  30  30   CR  0.65  0.76   GLC  75  78        Liver Function Studies -   Recent Labs   Lab Test 04/26/17 12/05/16   PROTTOTAL  6.2*  6.5   ALBUMIN  3.1*  3.2   BILITOTAL  0.4  0.6   ALKPHOS  123  143   AST  22  22   ALT  18  15       TSH   Date Value Ref Range Status   04/26/2017 1.89 0.40 - 4.00 mU/L Final       Assessment/Plan:  (R21) Rash and nonspecific skin eruption  (primary encounter diagnosis)  Comment: Acute and not resolving  Plan:   -Hydrocortisone butyrate 0.1% to affected areas BID x 14 days  -Prednisone burst and taper dose  -Staff to monitor for effectiveness  -Family updated        Electronically signed by  HIGINIO Pena CNP

## 2017-10-19 RX ORDER — HYDROCORTISONE BUTYRATE 0.1 %
CREAM (GRAM) TOPICAL
COMMUNITY
End: 2017-11-01

## 2017-10-31 ENCOUNTER — ASSISTED LIVING VISIT (OUTPATIENT)
Dept: GERIATRICS | Facility: CLINIC | Age: 82
End: 2017-10-31
Payer: MEDICARE

## 2017-10-31 DIAGNOSIS — R21 RASH AND NONSPECIFIC SKIN ERUPTION: Primary | ICD-10-CM

## 2017-10-31 NOTE — PROGRESS NOTES
[place] [facility]    Chief Complaint   Patient presents with     Nursing Home Acute       HPI:    April Donnelly is a 87 year old  (1/30/1930), who is being seen today for an episodic care visit at [facility].  HPI information obtained from: facility chart records.Today's concern is:    Rash and nonspecific skin eruption  Continues to have ongoing nonspecific pruritus skin rash with a lesion on her left cheek, bridge of nose and areas of posterior torso. Therapies tried include hydrocortisone butyrate 0.1% cream, 2.5% Hydrocortisone cream,  prednisone burst and taper. She is afebrile, shows no signs of pain but is positive for itch. Papules are discrete red/brown in color, some lesions are opened with scratching.      ALLERGIES: Hydrocodone; Lisinopril; Metoprolol; and Terazosin  Past Medical, Surgical, Family and Social History reviewed and updated in [facility].    Current Outpatient Prescriptions   Medication Sig Dispense Refill     CEPHALEXIN PO Take 500 mg by mouth 4 times daily       camphor-menthol (DERMASARRA) 0.5-0.5 % LOTN Apply topically 2 times daily at 8am and 8pm       hydrocortisone butyrate 0.1 % CREA Apply to reddened excoriated areas       PREDNISONE PO Take 40 mg by mouth Prednisone 40 mg po x 5 days then 30 mg po x 4 days then 20 mg po x 3 days then 10 mg po x 2 days then discontinue.       hydrocortisone 2.5 % cream Ap top to reddened area prn 30 g 98     ATENOLOL PO Take 25 mg by mouth daily       aspirin 81 MG EC tablet Take 1 tablet (81 mg) by mouth daily 31 tablet PRN     levothyroxine (LEVOTHROID) 75 MCG tablet Take 1 tablet (75 mcg) by mouth daily 31 tablet PRN     Citalopram Hydrobromide (CELEXA PO) Take 10 mg by mouth daily       LOSARTAN POTASSIUM PO Take 50 mg by mouth daily       Acetaminophen (TYLENOL PO) Take 650 mg by mouth every 6 hours as needed for mild pain or fever       Medications reviewed:  Medications reconciled to facility chart and changes were made to  reflect current medications as identified as above med list. Below are the changes that were made:   Medications stopped since last EPIC medication reconciliation:   There are no discontinued medications.    Medications started since last [facility] medication reconciliation:  Orders Placed This Encounter   Medications     camphor-menthol (DERMASARRA) 0.5-0.5 % LOTN     Sig: Apply topically 2 times daily at 8am and 8pm     REVIEW OF SYSTEMS:  Unobtainable secondary to cognitive impairment or aphasia, but today pt reports ok    Physical Exam:  /90  Pulse 63  Temp 97.2  F (36.2  C)  Resp 16  SpO2 98%  GENERAL APPEARANCE:  Alert, in no distress  ENT:  Mouth and posterior oropharynx normal, moist mucous membranes, [place]  EYES:  EOM, conjunctivae, lids, pupils and irises normal  NECK:  No adenopathy,masses or thyromegaly  RESP:  respiratory effort and palpation of chest normal, lungs clear to auscultation   CV:  Palpation and auscultation of heart done , no edema, rate-normal, grade 2/6 murmur, irregular rhythm   ABDOMEN:  normal bowel sounds, soft, nontender, no hepatosplenomegaly or other masses  M/S:   wheelchair is baseline, up with assist of one for pivot transfers  SKIN:  See HPI above  NEURO:   Examination of sensation by touch normal  PSYCH:  insight and judgement impaired, memory impaired      Recent Labs:    CBC RESULTS:   Recent Labs   Lab Test 04/26/17 12/05/16   WBC  5.0  6.3   RBC  3.93  4.14   HGB  12.1  12.7   HCT  36.9  37.8   MCV  94  91   MCH  30.8  30.7   MCHC  32.8  33.6   RDW  13.3  13.8   PLT  243  238       Last Basic Metabolic Panel:  Recent Labs   Lab Test 04/26/17 12/05/16   NA  140  141   POTASSIUM  4.1  4.0   CHLORIDE  106  106   BOLA  9.1  9.2   CO2  27  27   BUN  30  30   CR  0.65  0.76   GLC  75  78       Liver Function Studies -   Recent Labs   Lab Test 04/26/17 12/05/16   PROTTOTAL  6.2*  6.5   ALBUMIN  3.1*  3.2   BILITOTAL  0.4  0.6   ALKPHOS  123  143   AST  22  22   ALT  18  15        TSH   Date Value Ref Range Status   04/26/2017 1.89 0.40 - 4.00 mU/L Final       Assessment/Plan:  (R21) Rash and nonspecific skin eruption  (primary encounter diagnosis)  Comment: Ongoing and not responsive to treatments  Plan:  -Reviewed with MD and plan is antibiotic therapy to break itch/scratch cycle  -Cephalexin 500 mg po qid x 14 days  -Continue with Sarna lotion BID  -Wound care orders for open lesions        Electronically signed by  HIGINIO Pena CNP

## 2017-11-01 VITALS
OXYGEN SATURATION: 98 % | HEART RATE: 63 BPM | TEMPERATURE: 97.2 F | RESPIRATION RATE: 16 BRPM | SYSTOLIC BLOOD PRESSURE: 149 MMHG | DIASTOLIC BLOOD PRESSURE: 90 MMHG

## 2017-11-20 DIAGNOSIS — I10 ESSENTIAL HYPERTENSION: ICD-10-CM

## 2017-11-20 DIAGNOSIS — F43.21 ADJUSTMENT DISORDER WITH DEPRESSED MOOD: Primary | ICD-10-CM

## 2017-11-20 RX ORDER — LOSARTAN POTASSIUM 50 MG/1
50 TABLET ORAL DAILY
Qty: 31 TABLET | Refills: 98 | Status: SHIPPED | OUTPATIENT
Start: 2017-11-20 | End: 2018-01-16

## 2017-11-20 RX ORDER — CITALOPRAM HYDROBROMIDE 10 MG/1
10 TABLET ORAL DAILY
Qty: 31 TABLET | Refills: 98 | Status: SHIPPED | OUTPATIENT
Start: 2017-11-20 | End: 2018-09-25

## 2017-12-14 ENCOUNTER — ASSISTED LIVING VISIT (OUTPATIENT)
Dept: GERIATRICS | Facility: CLINIC | Age: 82
End: 2017-12-14
Payer: MEDICARE

## 2017-12-14 VITALS
DIASTOLIC BLOOD PRESSURE: 70 MMHG | RESPIRATION RATE: 16 BRPM | HEART RATE: 58 BPM | SYSTOLIC BLOOD PRESSURE: 120 MMHG | OXYGEN SATURATION: 97 % | TEMPERATURE: 98.8 F

## 2017-12-14 DIAGNOSIS — E03.9 ACQUIRED HYPOTHYROIDISM: ICD-10-CM

## 2017-12-14 DIAGNOSIS — I11.9 HYPERTENSIVE HEART DISEASE WITHOUT HEART FAILURE: Primary | ICD-10-CM

## 2017-12-14 DIAGNOSIS — F43.23 ADJUSTMENT DISORDER WITH MIXED ANXIETY AND DEPRESSED MOOD: ICD-10-CM

## 2017-12-14 RX ORDER — BACITRACIN ZINC AND POLYMYXIN B SULFATE 500; 1000 [USP'U]/G; [USP'U]/G
OINTMENT TOPICAL PRN
COMMUNITY
End: 2018-05-10

## 2017-12-14 NOTE — LETTER
12/14/2017        RE: April Donnelly  [facility]  [address] [address]        [place] [facility]    Chief Complaint   Patient presents with     RECHECK       HPI:    April Donnelly is a 87 year old  (1/30/1930), who is being seen today for an episodic care visit at [facility].  HPI information obtained from: facility chart records.Today's concern is:    Hypertensive heart disease without heart failure  BP have been on lower side per report. Not symptomatic but not reliable historian related to aphasia    Acquired hypothyroidism  On replacement therapy    Adjustment disorder with mixed anxiety and depressed mood  No concerns from staff    ALLERGIES: Hydrocodone; Lisinopril; Metoprolol; and Terazosin  Past Medical, Surgical, Family and Social History reviewed and updated in [facility].    Current Outpatient Prescriptions   Medication Sig Dispense Refill     bacitracin-polymyxin b (POLYSPORIN) ointment        citalopram (CELEXA) 10 MG tablet Take 1 tablet (10 mg) by mouth daily 31 tablet 98     losartan (COZAAR) 50 MG tablet Take 1 tablet (50 mg) by mouth daily 31 tablet 98     camphor-menthol (DERMASARRA) 0.5-0.5 % LOTN Apply topically 2 times daily at 8am and 8pm       hydrocortisone 2.5 % cream Ap top to reddened area prn 30 g 98     ATENOLOL PO Take 25 mg by mouth daily       aspirin 81 MG EC tablet Take 1 tablet (81 mg) by mouth daily 31 tablet PRN     levothyroxine (LEVOTHROID) 75 MCG tablet Take 1 tablet (75 mcg) by mouth daily 31 tablet PRN     Acetaminophen (TYLENOL PO) Take 650 mg by mouth every 6 hours as needed for mild pain or fever       Patient Active Problem List   Diagnosis     Cerebrovascular accident (H)     Hemorrhagic stroke (H)     Syncope     Adjustment disorder with mixed anxiety and depressed mood     Hypertensive heart disease without heart failure     Advance care planning     Closed nondisplaced fracture of pelvis with routine healing,  unspecified part of pelvis, subsequent encounter     Vascular dementia without behavioral disturbance     Acquired hypothyroidism       Medications reviewed:  Medications reconciled to facility chart and changes were made to reflect current medications as identified as above med list. Below are the changes that were made:   Medications stopped since last EPIC medication reconciliation:   There are no discontinued medications.    Medications started since last [facility] medication reconciliation:  Orders Placed This Encounter   Medications     bacitracin-polymyxin b (POLYSPORIN) ointment     REVIEW OF SYSTEMS:  Unobtainable secondary to cognitive impairment or aphasia.    Physical Exam:  /70  Pulse 58  Temp 98.8  F (37.1  C)  Resp 16  SpO2 97%  GENERAL APPEARANCE:  Alert, in no distress  ENT:  Mouth and posterior oropharynx normal, moist mucous membranes, [place]  EYES:  EOM, conjunctivae, lids, pupils and irises normal-wears glasses  NECK:  No adenopathy,masses or thyromegaly  RESP:  respiratory effort and palpation of chest normal, lungs clear to auscultation   CV:  Palpation and auscultation of heart done , no edema, rate- bradycardic at times-normal, grade 2/6 murmur, irregular rhythm   ABDOMEN:  normal bowel sounds, soft, nontender, no hepatosplenomegaly or other masses  M/S:   wheelchair is baseline, up with assist of one for pivot transfers  SKIN: at baseline, pale in color  NEURO:   Examination of sensation by touch normal  PSYCH:  insight and judgement impaired, memory impaired    Recent Labs:    CBC RESULTS:   Recent Labs   Lab Test 04/26/17 12/05/16   WBC  5.0  6.3   RBC  3.93  4.14   HGB  12.1  12.7   HCT  36.9  37.8   MCV  94  91   MCH  30.8  30.7   MCHC  32.8  33.6   RDW  13.3  13.8   PLT  243  238       Last Basic Metabolic Panel:  Recent Labs   Lab Test 04/26/17 12/05/16   NA  140  141   POTASSIUM  4.1  4.0   CHLORIDE  106  106   BOLA  9.1  9.2   CO2  27  27   BUN  30  30   CR  0.65  0.76   GLC  75   78       Liver Function Studies -   Recent Labs   Lab Test 04/26/17 12/05/16   PROTTOTAL  6.2*  6.5   ALBUMIN  3.1*  3.2   BILITOTAL  0.4  0.6   ALKPHOS  123  143   AST  22  22   ALT  18  15       TSH   Date Value Ref Range Status   04/26/2017 1.89 0.40 - 4.00 mU/L Final         Assessment/Plan:  (I11.9) Hypertensive heart disease without heart failure  (primary encounter diagnosis)  Comment: Tightly controlled BP  Plan:   -Will reduce Cozaar to 25 mg po daily and monitor  -Atenolol 25 mg po daily- was reduced from 50 mg po daily  -ASA 81 mg po daily  -Monitor VS at visits and during acute changes    (E03.9) Acquired hypothyroidism  Comment: Chronic from thyroidectomy   Plan: Continue current plan of care. Monitor TSH annually    (F43.23) Adjustment disorder with mixed anxiety and depressed mood  Comment: Chronic and well controlled  Plan:   -Celexa 10 mg po daily        Electronically signed by  HIGINIO Pena CNP                      Sincerely,        HIGINIO Pena CNP

## 2017-12-19 DIAGNOSIS — I10 BENIGN ESSENTIAL HYPERTENSION: ICD-10-CM

## 2017-12-19 DIAGNOSIS — E03.8 OTHER SPECIFIED HYPOTHYROIDISM: ICD-10-CM

## 2017-12-19 RX ORDER — LEVOTHYROXINE SODIUM 75 UG/1
75 TABLET ORAL DAILY
Qty: 31 TABLET | Status: SHIPPED | OUTPATIENT
Start: 2017-12-19 | End: 2018-08-04

## 2017-12-19 ASSESSMENT — PATIENT HEALTH QUESTIONNAIRE - PHQ9: SUM OF ALL RESPONSES TO PHQ QUESTIONS 1-9: 0

## 2018-01-16 ENCOUNTER — ASSISTED LIVING VISIT (OUTPATIENT)
Dept: GERIATRICS | Facility: CLINIC | Age: 83
End: 2018-01-16
Payer: MEDICARE

## 2018-01-16 VITALS
TEMPERATURE: 97.5 F | OXYGEN SATURATION: 95 % | RESPIRATION RATE: 16 BRPM | SYSTOLIC BLOOD PRESSURE: 114 MMHG | DIASTOLIC BLOOD PRESSURE: 69 MMHG | HEART RATE: 60 BPM

## 2018-01-16 DIAGNOSIS — I10 BENIGN ESSENTIAL HYPERTENSION: Primary | ICD-10-CM

## 2018-01-16 DIAGNOSIS — R21 RASH AND NONSPECIFIC SKIN ERUPTION: Primary | ICD-10-CM

## 2018-01-16 RX ORDER — LOSARTAN POTASSIUM 25 MG/1
25 TABLET ORAL DAILY
Qty: 30 TABLET | Refills: 98 | Status: SHIPPED | OUTPATIENT
Start: 2018-01-16 | End: 2018-03-01

## 2018-01-16 RX ORDER — HYDROCORTISONE BUTYRATE 0.1 %
CREAM (GRAM) TOPICAL 2 TIMES DAILY
COMMUNITY
End: 2018-01-16

## 2018-01-16 NOTE — LETTER
1/16/2018        RE: April Donnelly  Care of Che Donnelly  [address] [facility] [place] [address]        [facility]    Chief Complaint   Patient presents with     RECHECK       HPI:    April Donnelly is a 87 year old  (1/30/1930), who is being seen today for an episodic care visit at [facility].  HPI information obtained from: facility chart records.Today's concern is:    Rash and nonspecific skin eruption  Previous cleared with antibiotic use 10/31/17 has now returned. Mostly located on back, buttocks, scratch marks are visible so appears pruritic in nature. Some papules with scabs. She is afebrile, no other concerns from staff. Is aphasic at baseline and resides in [facility] so most of HPI is related from staff. Have used cortisone creams, and prednisone burst/taper with limited results.       ALLERGIES: Hydrocodone; Lisinopril; Metoprolol; and Terazosin  Past Medical, Surgical, Family and Social History reviewed and updated in [facility].    Current Outpatient Prescriptions   Medication Sig Dispense Refill     hydrocortisone butyrate 0.1 % CREA Externally apply topically 2 times daily       LOSARTAN POTASSIUM PO Take 25 mg by mouth daily       aspirin 81 MG EC tablet Take 1 tablet (81 mg) by mouth daily 31 tablet PRN     levothyroxine (LEVOTHROID) 75 MCG tablet Take 1 tablet (75 mcg) by mouth daily 31 tablet PRN     bacitracin-polymyxin b (POLYSPORIN) ointment        citalopram (CELEXA) 10 MG tablet Take 1 tablet (10 mg) by mouth daily 31 tablet 98     camphor-menthol (DERMASARRA) 0.5-0.5 % LOTN Apply topically 2 times daily at 8am and 8pm       hydrocortisone 2.5 % cream Ap top to reddened area prn 30 g 98     ATENOLOL PO Take 25 mg by mouth daily       Acetaminophen (TYLENOL PO) Take 650 mg by mouth every 6 hours as needed for mild pain or fever       Patient Active Problem List   Diagnosis     Cerebrovascular accident (H)     Hemorrhagic stroke (H)     Syncope     Adjustment  disorder with mixed anxiety and depressed mood     Hypertensive heart disease without heart failure     Advance care planning     Closed nondisplaced fracture of pelvis with routine healing, unspecified part of pelvis, subsequent encounter     Vascular dementia without behavioral disturbance     Acquired hypothyroidism       Medications reviewed:  Medications reconciled to facility chart and changes were made to reflect current medications as identified as above med list. Below are the changes that were made:   Medications stopped since last EPIC medication reconciliation:   Medications Discontinued During This Encounter   Medication Reason     losartan (COZAAR) 50 MG tablet Medication Reconciliation Clean Up       Medications started since last [facility] medication reconciliation:  Orders Placed This Encounter   Medications     hydrocortisone butyrate 0.1 % CREA     Sig: Externally apply topically 2 times daily     LOSARTAN POTASSIUM PO     Sig: Take 25 mg by mouth daily     REVIEW OF SYSTEMS:  Unobtainable secondary to cognitive impairment or aphasia.    Physical Exam:  /69  Pulse 60  Temp 97.5  F (36.4  C)  Resp 16  SpO2 95%  GENERAL APPEARANCE:  Alert, in no distress, siting in wheelchair  ENT:  Mouth and posterior oropharynx normal, moist mucous membranes, [place]  EYES:  EOM, conjunctivae, lids, pupils and irises normal  NECK:  No adenopathy,masses or thyromegaly  RESP:  respiratory effort and palpation of chest normal, lungs clear to auscultation   CV:  Palpation and auscultation of heart done , no edema, rate-normal, grade 2/6 murmur, irregular rhythm   ABDOMEN:  normal bowel sounds, soft, nontender, no hepatosplenomegaly or other masses  M/S:   wheelchair is baseline, up with assist of one for pivot transfers  SKIN:  See HPI above  NEURO:   Examination of sensation by touch normal  PSYCH:  insight and judgement impaired, memory impaired      Recent Labs:     CBC RESULTS:   Recent Labs   Lab Test 04/26/17  12/05/16   WBC  5.0  6.3   RBC  3.93  4.14   HGB  12.1  12.7   HCT  36.9  37.8   MCV  94  91   MCH  30.8  30.7   MCHC  32.8  33.6   RDW  13.3  13.8   PLT  243  238       Last Basic Metabolic Panel:  Recent Labs   Lab Test 04/26/17 12/05/16   NA  140  141   POTASSIUM  4.1  4.0   CHLORIDE  106  106   BOLA  9.1  9.2   CO2  27  27   BUN  30  30   CR  0.65  0.76   GLC  75  78       Liver Function Studies -   Recent Labs   Lab Test 04/26/17 12/05/16   PROTTOTAL  6.2*  6.5   ALBUMIN  3.1*  3.2   BILITOTAL  0.4  0.6   ALKPHOS  123  143   AST  22  22   ALT  18  15       TSH   Date Value Ref Range Status   04/26/2017 1.89 0.40 - 4.00 mU/L Final       Assessment/Plan:  (R21) Rash and nonspecific skin eruption  (primary encounter diagnosis)  Comment: Intermittent- resolved and returned  Plan:   -Cephalexin 500 mg po qid x 14 days  -Continue with Sarna lotion BID  -Facility protocol for open lesions  -Consider skin scraping to r/o other infectious conditions  -Reviewed plan of care with family        Electronically signed by  HIGINIO Pena CNP                      Sincerely,        HIGINIO Pena CNP

## 2018-01-16 NOTE — PROGRESS NOTES
[facility]    Chief Complaint   Patient presents with     RECHECK       HPI:    April Donnelly is a 87 year old  (1/30/1930), who is being seen today for an episodic care visit at [facility].  HPI information obtained from: facility chart records.Today's concern is:    Rash and nonspecific skin eruption  Previous cleared with antibiotic use 10/31/17 has now returned. Mostly located on back, buttocks, scratch marks are visible so appears pruritic in nature. Some papules with scabs. She is afebrile, no other concerns from staff. Is aphasic at baseline and resides in [facility] so most of HPI is related from staff. Have used cortisone creams, and prednisone burst/taper with limited results.       ALLERGIES: Hydrocodone; Lisinopril; Metoprolol; and Terazosin  Past Medical, Surgical, Family and Social History reviewed and updated in [facility].    Current Outpatient Prescriptions   Medication Sig Dispense Refill     hydrocortisone butyrate 0.1 % CREA Externally apply topically 2 times daily       LOSARTAN POTASSIUM PO Take 25 mg by mouth daily       aspirin 81 MG EC tablet Take 1 tablet (81 mg) by mouth daily 31 tablet PRN     levothyroxine (LEVOTHROID) 75 MCG tablet Take 1 tablet (75 mcg) by mouth daily 31 tablet PRN     bacitracin-polymyxin b (POLYSPORIN) ointment        citalopram (CELEXA) 10 MG tablet Take 1 tablet (10 mg) by mouth daily 31 tablet 98     camphor-menthol (DERMASARRA) 0.5-0.5 % LOTN Apply topically 2 times daily at 8am and 8pm       hydrocortisone 2.5 % cream Ap top to reddened area prn 30 g 98     ATENOLOL PO Take 25 mg by mouth daily       Acetaminophen (TYLENOL PO) Take 650 mg by mouth every 6 hours as needed for mild pain or fever       Patient Active Problem List   Diagnosis     Cerebrovascular accident (H)     Hemorrhagic stroke (H)     Syncope     Adjustment disorder with mixed anxiety and depressed mood     Hypertensive heart disease without heart failure     Advance care  planning     Closed nondisplaced fracture of pelvis with routine healing, unspecified part of pelvis, subsequent encounter     Vascular dementia without behavioral disturbance     Acquired hypothyroidism       Medications reviewed:  Medications reconciled to facility chart and changes were made to reflect current medications as identified as above med list. Below are the changes that were made:   Medications stopped since last EPIC medication reconciliation:   Medications Discontinued During This Encounter   Medication Reason     losartan (COZAAR) 50 MG tablet Medication Reconciliation Clean Up       Medications started since last [facility] medication reconciliation:  Orders Placed This Encounter   Medications     hydrocortisone butyrate 0.1 % CREA     Sig: Externally apply topically 2 times daily     LOSARTAN POTASSIUM PO     Sig: Take 25 mg by mouth daily     REVIEW OF SYSTEMS:  Unobtainable secondary to cognitive impairment or aphasia.    Physical Exam:  /69  Pulse 60  Temp 97.5  F (36.4  C)  Resp 16  SpO2 95%  GENERAL APPEARANCE:  Alert, in no distress, siting in wheelchair  ENT:  Mouth and posterior oropharynx normal, moist mucous membranes, [place]  EYES:  EOM, conjunctivae, lids, pupils and irises normal  NECK:  No adenopathy,masses or thyromegaly  RESP:  respiratory effort and palpation of chest normal, lungs clear to auscultation   CV:  Palpation and auscultation of heart done , no edema, rate-normal, grade 2/6 murmur, irregular rhythm   ABDOMEN:  normal bowel sounds, soft, nontender, no hepatosplenomegaly or other masses  M/S:   wheelchair is baseline, up with assist of one for pivot transfers  SKIN:  See HPI above  NEURO:   Examination of sensation by touch normal  PSYCH:  insight and judgement impaired, memory impaired      Recent Labs:     CBC RESULTS:   Recent Labs   Lab Test 04/26/17 12/05/16   WBC  5.0  6.3   RBC  3.93  4.14   HGB  12.1  12.7   HCT  36.9  37.8   MCV  94  91   MCH  30.8  30.7   MCHC   32.8  33.6   RDW  13.3  13.8   PLT  243  238       Last Basic Metabolic Panel:  Recent Labs   Lab Test 04/26/17 12/05/16   NA  140  141   POTASSIUM  4.1  4.0   CHLORIDE  106  106   BOLA  9.1  9.2   CO2  27  27   BUN  30  30   CR  0.65  0.76   GLC  75  78       Liver Function Studies -   Recent Labs   Lab Test 04/26/17 12/05/16   PROTTOTAL  6.2*  6.5   ALBUMIN  3.1*  3.2   BILITOTAL  0.4  0.6   ALKPHOS  123  143   AST  22  22   ALT  18  15       TSH   Date Value Ref Range Status   04/26/2017 1.89 0.40 - 4.00 mU/L Final       Assessment/Plan:  (R21) Rash and nonspecific skin eruption  (primary encounter diagnosis)  Comment: Intermittent- resolved and returned  Plan:   -Cephalexin 500 mg po qid x 14 days  -Continue with Sarna lotion BID  -Facility protocol for open lesions  -Consider skin scraping to r/o other infectious conditions  -Reviewed plan of care with family        Electronically signed by  HIGINIO Pena CNP

## 2018-02-07 ENCOUNTER — ASSISTED LIVING VISIT (OUTPATIENT)
Dept: GERIATRICS | Facility: CLINIC | Age: 83
End: 2018-02-07
Payer: MEDICARE

## 2018-02-07 DIAGNOSIS — F01.50 VASCULAR DEMENTIA WITHOUT BEHAVIORAL DISTURBANCE (H): ICD-10-CM

## 2018-02-07 DIAGNOSIS — S32.9XXD CLOSED NONDISPLACED FRACTURE OF PELVIS WITH ROUTINE HEALING, UNSPECIFIED PART OF PELVIS, SUBSEQUENT ENCOUNTER: ICD-10-CM

## 2018-02-07 DIAGNOSIS — E03.9 ACQUIRED HYPOTHYROIDISM: ICD-10-CM

## 2018-02-07 DIAGNOSIS — F43.23 ADJUSTMENT DISORDER WITH MIXED ANXIETY AND DEPRESSED MOOD: ICD-10-CM

## 2018-02-07 DIAGNOSIS — I10 BENIGN ESSENTIAL HYPERTENSION: ICD-10-CM

## 2018-02-07 DIAGNOSIS — R00.1 BRADYCARDIA: Primary | ICD-10-CM

## 2018-02-07 DIAGNOSIS — L29.9 PRURITIC DISORDER: ICD-10-CM

## 2018-02-07 NOTE — PROGRESS NOTES
April Donnelly is a 88 year old female seen February 7, 2018 at [facility] unit where she has resided for one year (admit 11/2016) seen to follow up HTN, dementia.   Patient is seen on the unit, up to WC.  Very quiet   Smiles and occ short answers to questions, but not able to give much meaningful history.   Patient had multiple falls at the time of her admission, and suffered acetabular and hip fracture in 1/2017, treated with pain management; she has been WC since that time.  Denies any current pain.   She has had some scratching behaviors, somewhat intractable to tx, including steroid la nena and prednisone taper.  Patient is not able to address this today.         Past Medical History:   Diagnosis Date     Adjustment disorder with mixed anxiety and depressed mood 12/2/2016     Adjustment disorder with mixed anxiety and depressed mood 12/2/2016     Cerebrovascular accident (H) 12/2/2016     CVA (cerebral vascular accident) (H)      Hemorrhagic stroke (H) 12/2/2016     Hypertensive heart disease without heart failure 12/2/2016     Severe dementia 12/2/2016     Syncope 12/2/2016     Thyroid disease       SH:    x1 year    She has 3 sons Woody Bolton and Reji.       ROS:  Limited, but negative there than above.   BP Readings from Last 3 Encounters:   01/16/18 114/69   12/14/17 120/70   11/01/17 149/90        EXAM:  Pleasant, NAD, frail  /69  Pulse 60  Temp 97.5  F (36.4  C)  Resp 16  SpO2 95%  Neck supple without adenopathy  Lungs with decreased BS, no rales or wheeze  Heart RRR s1s2 at 55     Abd soft, NT, no distention, +BS  Ext without edema  Skin: excoriated areas on ext  Neuro: limited verbal skills, no tremor or stiffness  Psych: affect okay, confused but smiling.     Last Basic Metabolic Panel:  Lab Results   Component Value Date     04/26/2017      Lab Results   Component Value Date    POTASSIUM 4.1 04/26/2017     Lab Results   Component Value Date    CHLORIDE 106 04/26/2017      Lab Results   Component Value Date    BOLA 9.1 04/26/2017     Lab Results   Component Value Date    CO2 27 04/26/2017     Lab Results   Component Value Date    BUN 30 04/26/2017     Lab Results   Component Value Date    CR 0.65 04/26/2017     Lab Results   Component Value Date    GLC 75 04/26/2017     Lab Results   Component Value Date    WBC 5.0 04/26/2017      HGB 12.1 04/26/2017      MCV 94 04/26/2017       04/26/2017     TSH   Date Value Ref Range Status   04/26/2017 1.89 0.40 - 4.00 mU/L Final       IMP/PLAN:  (R00.1) Bradycardia   Comment: slower heart rates recently and on today's exam  Plan: d/c atenolol, start metoprolol 12.5 mg daily.          (I10) Benign essential hypertension  Comment: lower bps     Plan: d/c losartan; metoprolol as above.       (L29.9) Pruritic disorder  Comment: scratching behaviors, no clear etiology     Plan: can use low dose hydroxyzine if worsens.   Minimize meds, local creams.       (F43.23) Adjustment disorder with mixed anxiety and depressed mood  Comment: good spirits, some emotional lability     Plan: continue citalopram, consider GDR as she declines       (S32.9XXD) Closed nondisplaced fracture of pelvis with routine healing, unspecified part of pelvis, subsequent encounter  Comment: non-ambulatory   Plan: acetaminophen prn.       (F01.50) Vascular dementia without behavioral disturbance  Comment: ongoing decline with loss of language and mobility  Plan: [facility] unit for meds, meals, activity, safety      (E03.9) Acquired hypothyroidism  Comment: on replacement   Plan: yearly TSH     AD: DNR/DNI, comfort focus     Teresa Muse MD

## 2018-02-16 DIAGNOSIS — I10 BENIGN ESSENTIAL HYPERTENSION: Primary | ICD-10-CM

## 2018-02-17 RX ORDER — METOPROLOL TARTRATE 25 MG/1
12.5 TABLET, FILM COATED ORAL DAILY
Qty: 16 TABLET | Refills: 98 | Status: ON HOLD | OUTPATIENT
Start: 2018-02-17 | End: 2018-08-28

## 2018-03-01 ENCOUNTER — ASSISTED LIVING VISIT (OUTPATIENT)
Dept: GERIATRICS | Facility: CLINIC | Age: 83
End: 2018-03-01
Payer: MEDICARE

## 2018-03-01 DIAGNOSIS — R21 RASH AND NONSPECIFIC SKIN ERUPTION: Primary | ICD-10-CM

## 2018-03-01 RX ORDER — HYDROCORTISONE 2.5 %
CREAM (GRAM) TOPICAL 2 TIMES DAILY
COMMUNITY
End: 2018-03-03

## 2018-03-01 NOTE — LETTER
3/1/2018        RE: April Donnelly  Care of Che Donnelly  [address] [facility] [address]        [facility]    Chief Complaint   Patient presents with     Nursing Home Acute       HPI:    April Donnelly is a 88 year old  (1/30/1930), who is being seen today for an episodic care visit at [facility].  HPI information obtained from: facility chart records.Today's concern is:    Rash and nonspecific skin eruption  Previously cleared with antibiotic use both topical and systemic, also some improvement with cortisone creams, prednisone burst/taper, antifungals. All have limited results but rash and skin itch returns. With today's  mostly located on buttocks. Scratch marks are visible and there are a few broken areas currently treated with wound care. Hx of some allergies. Family reports she has tendencies of picking at skin. Unable to have her see dermatology with advancing dementia and resides on memory care unit.       ALLERGIES: Hydrocodone; Lisinopril; Metoprolol; and Terazosin  Past Medical, Surgical, Family and Social History reviewed and updated in [facility].    Current Outpatient Prescriptions   Medication Sig Dispense Refill     hydrocortisone 2.5 % cream Apply topically 2 times daily       HYDROXYZINE HCL PO Take 10 mg by mouth daily       metoprolol tartrate (LOPRESSOR) 25 MG tablet Take 0.5 tablets (12.5 mg) by mouth daily 16 tablet 98     aspirin 81 MG EC tablet Take 1 tablet (81 mg) by mouth daily 31 tablet PRN     levothyroxine (LEVOTHROID) 75 MCG tablet Take 1 tablet (75 mcg) by mouth daily 31 tablet PRN     bacitracin-polymyxin b (POLYSPORIN) ointment Apply topically as needed        citalopram (CELEXA) 10 MG tablet Take 1 tablet (10 mg) by mouth daily 31 tablet 98     camphor-menthol (DERMASARRA) 0.5-0.5 % LOTN Apply topically 2 times daily at 8am and 8pm ; AND 2x daily PRN       hydrocortisone 2.5 % cream Ap top to reddened area prn 30 g 98     Acetaminophen  (TYLENOL PO) Take 650 mg by mouth every 6 hours as needed for mild pain or fever       Patient Active Problem List   Diagnosis     Cerebrovascular accident (H)     Hemorrhagic stroke (H)     Syncope     Adjustment disorder with mixed anxiety and depressed mood     Hypertensive heart disease without heart failure     Advance care planning     Closed nondisplaced fracture of pelvis with routine healing, unspecified part of pelvis, subsequent encounter     Vascular dementia without behavioral disturbance     Acquired hypothyroidism     Rash and nonspecific skin eruption       Medications reviewed:  Medications reconciled to facility chart and changes were made to reflect current medications as identified as above med list. Below are the changes that were made:   Medications stopped since last EPIC medication reconciliation:   Medications Discontinued During This Encounter   Medication Reason     OSELTAMIVIR PHOSPHATE PO Medication Reconciliation Clean Up     losartan (COZAAR) 25 MG tablet Medication Reconciliation Clean Up     ATENOLOL PO Medication Reconciliation Clean Up       Medications started since last [facility] medication reconciliation:  Orders Placed This Encounter   Medications     hydrocortisone 2.5 % cream     Sig: Apply topically 2 times daily     HYDROXYZINE HCL PO     Sig: Take 10 mg by mouth daily     REVIEW OF SYSTEMS:  Unobtainable secondary to cognitive limitations and aphasia    Physical Exam:  /64  Pulse 65  Temp 96.4  F (35.8  C)  Resp 16  GENERAL APPEARANCE:  Alert, in no distress, siting in wheelchair  ENT:  Mouth and posterior oropharynx normal, moist mucous membranes, [place]  EYES:  EOM, conjunctivae, lids, pupils and irises normal  NECK:  No adenopathy,masses or thyromegaly  RESP:  respiratory effort and palpation of chest normal, lungs clear to auscultation   CV:  Palpation and auscultation of heart done , no edema, rate-normal, grade 2/6 murmur, irregular rhythm   ABDOMEN:  normal bowel  sounds, soft, nontender, no hepatosplenomegaly or other masses  M/S:   wheelchair is baseline, up with assist of one for pivot transfers  SKIN:  See HPI above  NEURO:   Examination of sensation by touch normal  PSYCH:  insight and judgement impaired, memory impaired      Recent Labs:     CBC RESULTS:   Recent Labs   Lab Test 04/26/17 12/05/16   WBC  5.0  6.3   RBC  3.93  4.14   HGB  12.1  12.7   HCT  36.9  37.8   MCV  94  91   MCH  30.8  30.7   MCHC  32.8  33.6   RDW  13.3  13.8   PLT  243  238       Last Basic Metabolic Panel:  Recent Labs   Lab Test 04/26/17 12/05/16   NA  140  141   POTASSIUM  4.1  4.0   CHLORIDE  106  106   BOLA  9.1  9.2   CO2  27  27   BUN  30  30   CR  0.65  0.76   GLC  75  78       Liver Function Studies -   Recent Labs   Lab Test 04/26/17 12/05/16   PROTTOTAL  6.2*  6.5   ALBUMIN  3.1*  3.2   BILITOTAL  0.4  0.6   ALKPHOS  123  143   AST  22  22   ALT  18  15       TSH   Date Value Ref Range Status   04/26/2017 1.89 0.40 - 4.00 mU/L Final       Assessment/Plan:  (R21) Rash and nonspecific skin eruption  (primary encounter diagnosis)  Comment: Ongoing issue   Plan:   -Continue wound care to open areas  -Will treat with Permethrin topical x 3 days and monitor for effectiveness  -Hydroxyzine 10 mg po at HS for comfort  -Facility treated resident's room and clothing  -Family updated       Electronically signed by  HIGINIO Pena CNP                      Sincerely,        HIGINIO Pena CNP

## 2018-03-01 NOTE — PROGRESS NOTES
[facility]    Chief Complaint   Patient presents with     Nursing Home Acute       HPI:    April Donnelly is a 88 year old  (1/30/1930), who is being seen today for an episodic care visit at [facility].  HPI information obtained from: facility chart records.Today's concern is:    Rash and nonspecific skin eruption  Previously cleared with antibiotic use both topical and systemic, also some improvement with cortisone creams, prednisone burst/taper, antifungals. All have limited results but rash and skin itch returns. With today's  mostly located on buttocks. Scratch marks are visible and there are a few broken areas currently treated with wound care. Hx of some allergies. Family reports she has tendencies of picking at skin. Unable to have her see dermatology with advancing dementia and resides on memory care unit.       ALLERGIES: Hydrocodone; Lisinopril; Metoprolol; and Terazosin  Past Medical, Surgical, Family and Social History reviewed and updated in [facility].    Current Outpatient Prescriptions   Medication Sig Dispense Refill     hydrocortisone 2.5 % cream Apply topically 2 times daily       HYDROXYZINE HCL PO Take 10 mg by mouth daily       metoprolol tartrate (LOPRESSOR) 25 MG tablet Take 0.5 tablets (12.5 mg) by mouth daily 16 tablet 98     aspirin 81 MG EC tablet Take 1 tablet (81 mg) by mouth daily 31 tablet PRN     levothyroxine (LEVOTHROID) 75 MCG tablet Take 1 tablet (75 mcg) by mouth daily 31 tablet PRN     bacitracin-polymyxin b (POLYSPORIN) ointment Apply topically as needed        citalopram (CELEXA) 10 MG tablet Take 1 tablet (10 mg) by mouth daily 31 tablet 98     camphor-menthol (DERMASARRA) 0.5-0.5 % LOTN Apply topically 2 times daily at 8am and 8pm ; AND 2x daily PRN       hydrocortisone 2.5 % cream Ap top to reddened area prn 30 g 98     Acetaminophen (TYLENOL PO) Take 650 mg by mouth every 6 hours as needed for mild pain or fever       Patient Active Problem List    Diagnosis     Cerebrovascular accident (H)     Hemorrhagic stroke (H)     Syncope     Adjustment disorder with mixed anxiety and depressed mood     Hypertensive heart disease without heart failure     Advance care planning     Closed nondisplaced fracture of pelvis with routine healing, unspecified part of pelvis, subsequent encounter     Vascular dementia without behavioral disturbance     Acquired hypothyroidism     Rash and nonspecific skin eruption       Medications reviewed:  Medications reconciled to facility chart and changes were made to reflect current medications as identified as above med list. Below are the changes that were made:   Medications stopped since last EPIC medication reconciliation:   Medications Discontinued During This Encounter   Medication Reason     OSELTAMIVIR PHOSPHATE PO Medication Reconciliation Clean Up     losartan (COZAAR) 25 MG tablet Medication Reconciliation Clean Up     ATENOLOL PO Medication Reconciliation Clean Up       Medications started since last EPIC medication reconciliation:  Orders Placed This Encounter   Medications     hydrocortisone 2.5 % cream     Sig: Apply topically 2 times daily     HYDROXYZINE HCL PO     Sig: Take 10 mg by mouth daily     REVIEW OF SYSTEMS:  Unobtainable secondary to cognitive limitations and aphasia    Physical Exam:  /64  Pulse 65  Temp 96.4  F (35.8  C)  Resp 16  GENERAL APPEARANCE:  Alert, in no distress, siting in wheelchair  ENT:  Mouth and posterior oropharynx normal, moist mucous membranes, [place]  EYES:  EOM, conjunctivae, lids, pupils and irises normal  NECK:  No adenopathy,masses or thyromegaly  RESP:  respiratory effort and palpation of chest normal, lungs clear to auscultation   CV:  Palpation and auscultation of heart done , no edema, rate-normal, grade 2/6 murmur, irregular rhythm   ABDOMEN:  normal bowel sounds, soft, nontender, no hepatosplenomegaly or other masses  M/S:   wheelchair is baseline, up with assist of one  for pivot transfers  SKIN:  See HPI above  NEURO:   Examination of sensation by touch normal  PSYCH:  insight and judgement impaired, memory impaired      Recent Labs:     CBC RESULTS:   Recent Labs   Lab Test 04/26/17 12/05/16   WBC  5.0  6.3   RBC  3.93  4.14   HGB  12.1  12.7   HCT  36.9  37.8   MCV  94  91   MCH  30.8  30.7   MCHC  32.8  33.6   RDW  13.3  13.8   PLT  243  238       Last Basic Metabolic Panel:  Recent Labs   Lab Test 04/26/17 12/05/16   NA  140  141   POTASSIUM  4.1  4.0   CHLORIDE  106  106   BOLA  9.1  9.2   CO2  27  27   BUN  30  30   CR  0.65  0.76   GLC  75  78       Liver Function Studies -   Recent Labs   Lab Test 04/26/17 12/05/16   PROTTOTAL  6.2*  6.5   ALBUMIN  3.1*  3.2   BILITOTAL  0.4  0.6   ALKPHOS  123  143   AST  22  22   ALT  18  15       TSH   Date Value Ref Range Status   04/26/2017 1.89 0.40 - 4.00 mU/L Final       Assessment/Plan:  (R21) Rash and nonspecific skin eruption  (primary encounter diagnosis)  Comment: Ongoing issue   Plan:   -Continue wound care to open areas  -Will treat with Permethrin topical x 3 days and monitor for effectiveness  -Hydroxyzine 10 mg po at HS for comfort  -Facility treated resident's room and clothing  -Family updated       Electronically signed by  HIGINIO Pena CNP

## 2018-03-03 VITALS
SYSTOLIC BLOOD PRESSURE: 130 MMHG | TEMPERATURE: 96.4 F | RESPIRATION RATE: 16 BRPM | HEART RATE: 65 BPM | DIASTOLIC BLOOD PRESSURE: 64 MMHG

## 2018-03-03 PROBLEM — R21 RASH AND NONSPECIFIC SKIN ERUPTION: Status: ACTIVE | Noted: 2018-03-03

## 2018-03-14 DIAGNOSIS — L85.3 DRY SKIN: Primary | ICD-10-CM

## 2018-03-22 DIAGNOSIS — R21 RASH: Primary | ICD-10-CM

## 2018-04-20 ENCOUNTER — ASSISTED LIVING VISIT (OUTPATIENT)
Dept: GERIATRICS | Facility: CLINIC | Age: 83
End: 2018-04-20
Payer: MEDICARE

## 2018-04-20 DIAGNOSIS — R53.83 LETHARGY: Primary | ICD-10-CM

## 2018-04-20 DIAGNOSIS — M54.50 PAIN OF LUMBAR SPINE WITH MOVEMENT: ICD-10-CM

## 2018-04-20 DIAGNOSIS — Z71.89 ADVANCED CARE PLANNING/COUNSELING DISCUSSION: ICD-10-CM

## 2018-04-20 RX ORDER — LIDOCAINE 50 MG/G
OINTMENT TOPICAL DAILY
COMMUNITY
End: 2018-08-22

## 2018-04-20 NOTE — PROGRESS NOTES
[facility]    Chief Complaint   Patient presents with     Fall       HPI:    April Donnelly is a 88 year old  (1/30/1930), who is being seen today for an episodic care visit at [facility].  HPI information obtained from: facility chart records, facility staff, patient report and [facility] chart review.Today's concern is:    Lethargy  Pain of lumbar spine with movement  [facility] resident with recent fall (4/17) now with increased lethargy, increase daytime sleepiness and staff reports s/s of pain with any movement. She has been in bed throughout the day at time of exam. ROM with hips and lower extremities ok with no signs of pain. No pain with pinpoint palpation, pedal pulses +2 bilateral so moved from laying to sitting with strong assist of 2 and no s/s of pain. Able to bear weight on both lower extremities with pivot transfer to wheelchair. Move alert today per staff.    ACP  Per staff a general decline in condition. Weights from facility chart do not support this but could be inaccurate.     ALLERGIES: Hydrocodone; Lisinopril; Metoprolol; and Terazosin  Past Medical, Surgical, Family and Social History reviewed and updated in [facility].    Current Outpatient Prescriptions   Medication Sig Dispense Refill     Acetaminophen (TYLENOL PO) Take 1,000 mg by mouth 2 times daily        ACETAMINOPHEN PO Take 500 mg by mouth 2 times daily as needed for pain       aspirin 81 MG EC tablet Take 1 tablet (81 mg) by mouth daily 31 tablet PRN     bacitracin-polymyxin b (POLYSPORIN) ointment Apply topically as needed        camphor-menthol (DERMASARRA) 0.5-0.5 % LOTN Apply topically 2 times daily at 8am and 8pm ; AND 2x daily PRN       camphor-menthol (DERMASARRA) 0.5-0.5 % LOTN Apply topically 2 times daily as needed for skin care       camphor-menthol (DERMASARRA) 0.5-0.5 % LOTN Apply topically At Bedtime       citalopram (CELEXA) 10 MG tablet Take 1 tablet (10 mg) by mouth daily 31 tablet 98      hydrocortisone 2.5 % cream Ap top to reddened area prn 30 g 98     HYDROXYZINE HCL PO Take 10 mg by mouth daily       levothyroxine (LEVOTHROID) 75 MCG tablet Take 1 tablet (75 mcg) by mouth daily 31 tablet PRN     lidocaine (XYLOCAINE) 5 % ointment Apply topically daily       Menthol-Zinc Oxide (CALMOSEPTINE) 0.44-20.6 % OINT Externally apply 71 g topically 2 times daily APPLY TOPICALLY TO COCCYX TWICE DAILY AND WITH ALL INCONTINENT CHANGES FOR SKIN ITCH AND BREAKDOWN 113 g 11     metoprolol tartrate (LOPRESSOR) 25 MG tablet Take 0.5 tablets (12.5 mg) by mouth daily 16 tablet 98     Patient Active Problem List   Diagnosis     Cerebrovascular accident (H)     Hemorrhagic stroke (H)     Syncope     Adjustment disorder with mixed anxiety and depressed mood     Hypertensive heart disease without heart failure     Advance care planning     Closed nondisplaced fracture of pelvis with routine healing, unspecified part of pelvis, subsequent encounter     Vascular dementia without behavioral disturbance     Acquired hypothyroidism     Rash and nonspecific skin eruption       Medications reviewed:  Medications reconciled to facility chart and changes were made to reflect current medications as identified as above med list. Below are the changes that were made:   Medications stopped since last EPIC medication reconciliation:   There are no discontinued medications.    Medications started since last [facility] medication reconciliation:  No orders of the defined types were placed in this encounter.    REVIEW OF SYSTEMS:  Unobtainable secondary to aphasia.     Physical Exam:  /63  Pulse 56  Temp 96.4  F (35.8  C)  Resp 16  SpO2 97%  GENERAL APPEARANCE:  Alert, in no distress, laying in bed  ENT:  Mouth and posterior oropharynx normal, moist mucous membranes, [place]  EYES:  EOM, conjunctivae, lids, pupils and irises normal  NECK:  No adenopathy,masses or thyromegaly  RESP:  respiratory effort and palpation of chest normal,  lungs clear to auscultation   CV:  Palpation and auscultation of heart done , no edema, rate-normal, grade 2/6 murmur, irregular rhythm   ABDOMEN:  normal bowel sounds, soft, nontender  M/S:   wheelchair is baseline, up with assist of two for pivot transfers. ROM intact with lower extremities. No s/s of pain with pinpoint palpation of lumbar/sacral spine, pelvis  SKIN: WNL.   NEURO:   Examination of sensation by touch normal  PSYCH:  insight and judgement impaired, memory impaired      Recent Labs:   CBC RESULTS:   Lab Test 04/26/17   WBC  5.0   RBC  3.93   HGB  12.1   HCT  36.9   MCV  94   MCH  30.8   MCHC  32.8   RDW  13.3   PLT  243       Last Basic Metabolic Panel:  Lab Test 04/26/17   NA  140   POTASSIUM  4.1   CHLORIDE  106   BOLA  9.1   CO2  27   BUN  30   CR  0.65   GLC  75       Liver Function Studies -   Lab Test 04/26/17   PROTTOTAL  6.2*   ALBUMIN  3.1*   BILITOTAL  0.4   ALKPHOS  123   AST  22   ALT  18       TSH   Date Value Ref Range Status   04/26/2017 1.89 0.40 - 4.00 mU/L Final       Assessment/Plan:  (R53.83) Lethargy  (primary encounter diagnosis)  (M54.5) Pain of lumbar spine with movement  Comment: Change of condition with recent fall but improving  Plan:   -Continue to monitor neuros as needed. Monitor for change in mental condition  -Tylenol increased to 1,000 mg po BID and 500 mg po twice daily  -Lidocaine ointment to affected areas once daily    (Z71.89) Advanced care planning/counseling discussion  Comment: Ongoing  Plan:  -Family ok with evaluation for hospice       Electronically signed by  HIGINIO Pena CNP

## 2018-04-20 NOTE — LETTER
4/20/2018        RE: April Donnelly  Care of Che Donnelly  [address] [address]        [facility]    Chief Complaint   Patient presents with     Fall       HPI:    April Donnelly is a 88 year old  (1/30/1930), who is being seen today for an episodic care visit at [facility].  HPI information obtained from: facility chart records, facility staff, patient report and [facility] chart review.Today's concern is:    Lethargy  Pain of lumbar spine with movement  [facility] resident with recent fall (4/17) now with increased lethargy, increase daytime sleepiness and staff reports s/s of pain with any movement. She has been in bed throughout the day at time of exam. ROM with hips and lower extremities ok with no signs of pain. No pain with pinpoint palpation, pedal pulses +2 bilateral so moved from laying to sitting with strong assist of 2 and no s/s of pain. Able to bear weight on both lower extremities with pivot transfer to wheelchair. Move alert today per staff.    ACP  Per staff a general decline in condition. Weights from facility chart do not support this but could be inaccurate.     ALLERGIES: Hydrocodone; Lisinopril; Metoprolol; and Terazosin  Past Medical, Surgical, Family and Social History reviewed and updated in [facility].    Current Outpatient Prescriptions   Medication Sig Dispense Refill     Acetaminophen (TYLENOL PO) Take 1,000 mg by mouth 2 times daily        ACETAMINOPHEN PO Take 500 mg by mouth 2 times daily as needed for pain       aspirin 81 MG EC tablet Take 1 tablet (81 mg) by mouth daily 31 tablet PRN     bacitracin-polymyxin b (POLYSPORIN) ointment Apply topically as needed        camphor-menthol (DERMASARRA) 0.5-0.5 % LOTN Apply topically 2 times daily at 8am and 8pm ; AND 2x daily PRN       camphor-menthol (DERMASARRA) 0.5-0.5 % LOTN Apply topically 2 times daily as needed for skin care       camphor-menthol (DERMASARRA) 0.5-0.5 % LOTN Apply  topically At Bedtime       citalopram (CELEXA) 10 MG tablet Take 1 tablet (10 mg) by mouth daily 31 tablet 98     hydrocortisone 2.5 % cream Ap top to reddened area prn 30 g 98     HYDROXYZINE HCL PO Take 10 mg by mouth daily       levothyroxine (LEVOTHROID) 75 MCG tablet Take 1 tablet (75 mcg) by mouth daily 31 tablet PRN     lidocaine (XYLOCAINE) 5 % ointment Apply topically daily       Menthol-Zinc Oxide (CALMOSEPTINE) 0.44-20.6 % OINT Externally apply 71 g topically 2 times daily APPLY TOPICALLY TO COCCYX TWICE DAILY AND WITH ALL INCONTINENT CHANGES FOR SKIN ITCH AND BREAKDOWN 113 g 11     metoprolol tartrate (LOPRESSOR) 25 MG tablet Take 0.5 tablets (12.5 mg) by mouth daily 16 tablet 98     Patient Active Problem List   Diagnosis     Cerebrovascular accident (H)     Hemorrhagic stroke (H)     Syncope     Adjustment disorder with mixed anxiety and depressed mood     Hypertensive heart disease without heart failure     Advance care planning     Closed nondisplaced fracture of pelvis with routine healing, unspecified part of pelvis, subsequent encounter     Vascular dementia without behavioral disturbance     Acquired hypothyroidism     Rash and nonspecific skin eruption       Medications reviewed:  Medications reconciled to facility chart and changes were made to reflect current medications as identified as above med list. Below are the changes that were made:   Medications stopped since last EPIC medication reconciliation:   There are no discontinued medications.    Medications started since last [facility] medication reconciliation:  No orders of the defined types were placed in this encounter.    REVIEW OF SYSTEMS:  Unobtainable secondary to aphasia.     Physical Exam:  /63  Pulse 56  Temp 96.4  F (35.8  C)  Resp 16  SpO2 97%  GENERAL APPEARANCE:  Alert, in no distress, laying in bed  ENT:  Mouth and posterior oropharynx normal, moist mucous membranes, [place]  EYES:  EOM, conjunctivae, lids, pupils and irises  normal  NECK:  No adenopathy,masses or thyromegaly  RESP:  respiratory effort and palpation of chest normal, lungs clear to auscultation   CV:  Palpation and auscultation of heart done , no edema, rate-normal, grade 2/6 murmur, irregular rhythm   ABDOMEN:  normal bowel sounds, soft, nontender  M/S:   wheelchair is baseline, up with assist of two for pivot transfers. ROM intact with lower extremities. No s/s of pain with pinpoint palpation of lumbar/sacral spine, pelvis  SKIN: WNL.   NEURO:   Examination of sensation by touch normal  PSYCH:  insight and judgement impaired, memory impaired      Recent Labs:   CBC RESULTS:   Lab Test 04/26/17   WBC  5.0   RBC  3.93   HGB  12.1   HCT  36.9   MCV  94   MCH  30.8   MCHC  32.8   RDW  13.3   PLT  243       Last Basic Metabolic Panel:  Lab Test 04/26/17   NA  140   POTASSIUM  4.1   CHLORIDE  106   BOLA  9.1   CO2  27   BUN  30   CR  0.65   GLC  75       Liver Function Studies -   Lab Test 04/26/17   PROTTOTAL  6.2*   ALBUMIN  3.1*   BILITOTAL  0.4   ALKPHOS  123   AST  22   ALT  18       TSH   Date Value Ref Range Status   04/26/2017 1.89 0.40 - 4.00 mU/L Final       Assessment/Plan:  (R53.83) Lethargy  (primary encounter diagnosis)  (M54.5) Pain of lumbar spine with movement  Comment: Change of condition with recent fall but improving  Plan:   -Continue to monitor neuros as needed. Monitor for change in mental condition  -Tylenol increased to 1,000 mg po BID and 500 mg po twice daily  -Lidocaine ointment to affected areas once daily    (Z71.89) Advanced care planning/counseling discussion  Comment: Ongoing  Plan:  -Family ok with evaluation for hospice       Electronically signed by  HIGINIO Pena CNP                      Sincerely,        HIGINIO Pena CNP

## 2018-04-21 VITALS
SYSTOLIC BLOOD PRESSURE: 122 MMHG | RESPIRATION RATE: 16 BRPM | TEMPERATURE: 96.4 F | DIASTOLIC BLOOD PRESSURE: 63 MMHG | HEART RATE: 56 BPM | OXYGEN SATURATION: 97 %

## 2018-05-02 ENCOUNTER — DOCUMENTATION ONLY (OUTPATIENT)
Dept: OTHER | Facility: CLINIC | Age: 83
End: 2018-05-02

## 2018-05-02 DIAGNOSIS — Z71.89 ADVANCE CARE PLANNING: Chronic | ICD-10-CM

## 2018-05-10 ENCOUNTER — ASSISTED LIVING VISIT (OUTPATIENT)
Dept: GERIATRICS | Facility: CLINIC | Age: 83
End: 2018-05-10
Payer: MEDICARE

## 2018-05-10 VITALS
TEMPERATURE: 97.3 F | SYSTOLIC BLOOD PRESSURE: 145 MMHG | DIASTOLIC BLOOD PRESSURE: 79 MMHG | OXYGEN SATURATION: 97 % | HEART RATE: 70 BPM | RESPIRATION RATE: 16 BRPM

## 2018-05-10 DIAGNOSIS — R21 RASH AND NONSPECIFIC SKIN ERUPTION: ICD-10-CM

## 2018-05-10 DIAGNOSIS — Z86.73 H/O: CVA (CEREBROVASCULAR ACCIDENT): ICD-10-CM

## 2018-05-10 DIAGNOSIS — I11.9 HYPERTENSIVE HEART DISEASE WITHOUT HEART FAILURE: ICD-10-CM

## 2018-05-10 DIAGNOSIS — R62.7 ADULT FAILURE TO THRIVE: ICD-10-CM

## 2018-05-10 DIAGNOSIS — E03.9 ACQUIRED HYPOTHYROIDISM: Primary | ICD-10-CM

## 2018-05-10 DIAGNOSIS — F01.50 VASCULAR DEMENTIA WITHOUT BEHAVIORAL DISTURBANCE (H): ICD-10-CM

## 2018-05-10 NOTE — LETTER
5/10/2018        RE: April Donnelly  Care of [facility]  [address] [address]        [facility]    Chief Complaint   Patient presents with     RECHECK       [facility] Medical Record Number:  [id number]    HPI:    April Donnelly is a 88 year old  (1/30/1930), who is being seen today for an episodic care visit at [facility]  HPI information obtained from: facility chart records.Today's concern is:    Acquired hypothyroidism  Continues on replacement. Due for TSH lab. Hx of goiter removed.     H/O: CVA (cerebrovascular accident)  Vascular dementia without behavioral disturbance  Baseline weakness and now using EZ stand for transfers. Relies on staff for ADLs    Rash and nonspecific skin eruption  Scratching and picking behaviors per son is baseline. Staff reports reddened area around scab on left lower arm with infection potential. Slight redness seen today on exam, no swelling or warmth to area. No indicators of pain with exam.    Adult failure to thrive  Slow ongoing decline but did not meet critter for hospice admission. Did have fall resulting in lower back pain.    Hypertensive heart disease without heart failure  BP average 135/72 HR 67.     ALLERGIES: Hydrocodone; Lisinopril; Metoprolol; and Terazosin  Past Medical, Surgical, Family and Social History reviewed and updated in [facility].    Current Outpatient Prescriptions   Medication Sig Dispense Refill     Acetaminophen (TYLENOL PO) Take 1,000 mg by mouth 2 times daily        ACETAMINOPHEN PO Take 500 mg by mouth 2 times daily as needed for pain       aspirin 81 MG EC tablet Take 1 tablet (81 mg) by mouth daily 31 tablet PRN     bacitracin-polymyxin b (POLYSPORIN) ointment Apply topically as needed        camphor-menthol (DERMASARRA) 0.5-0.5 % LOTN Apply topically 2 times daily as needed for skin care       camphor-menthol (DERMASARRA) 0.5-0.5 % LOTN Apply topically At Bedtime       citalopram (CELEXA) 10 MG  tablet Take 1 tablet (10 mg) by mouth daily 31 tablet 98     hydrocortisone 2.5 % cream Ap top to reddened area prn 30 g 98     HYDROXYZINE HCL PO Take 10 mg by mouth daily       levothyroxine (LEVOTHROID) 75 MCG tablet Take 1 tablet (75 mcg) by mouth daily 31 tablet PRN     lidocaine (XYLOCAINE) 5 % ointment Apply topically daily       Menthol-Zinc Oxide (CALMOSEPTINE) 0.44-20.6 % OINT Externally apply 71 g topically 2 times daily APPLY TOPICALLY TO COCCYX TWICE DAILY AND WITH ALL INCONTINENT CHANGES FOR SKIN ITCH AND BREAKDOWN 113 g 11     metoprolol tartrate (LOPRESSOR) 25 MG tablet Take 0.5 tablets (12.5 mg) by mouth daily 16 tablet 98     Medications reviewed:  Medications reconciled to facility chart and changes were made to reflect current medications as identified as above med list. Below are the changes that were made:   Medications stopped since last EPIC medication reconciliation:   Medications Discontinued During This Encounter   Medication Reason     camphor-menthol (DERMASARRA) 0.5-0.5 % LOTN Medication Reconciliation Clean Up       Medications started since last [facility] medication reconciliation:  No orders of the defined types were placed in this encounter.    REVIEW OF SYSTEMS:  Unobtainable secondary to cognitive impairment.     Physical Exam:  /79  Pulse 70  Temp 97.3  F (36.3  C)  Resp 16  SpO2 97%  GENERAL APPEARANCE:  Alert, in no distress, siting in wheelchair  ENT:  Mouth and posterior oropharynx normal, dry mucous membranes, [place]  EYES:  EOM, conjunctivae, lids, pupils and irises normal  NECK:  No adenopathy,masses or thyromegaly  RESP:  respiratory effort and palpation of chest normal, lungs clear to auscultation   CV:  Palpation and auscultation of heart done , no edema, rate-normal, grade 2/6 murmur, irregular rhythm   ABDOMEN:  normal bowel sounds, soft, nontender, no hepatosplenomegaly or other masses  M/S:   wheelchair is baseline, EZ Stand for transfers  SKIN: Overall intact few  areas with scabs, slight redness around, scarring marks from previous scratching  NEURO:   Examination of sensation by touch normal  PSYCH:  insight and judgement impaired, memory impaired        Recent Labs:     CBC RESULTS:   Recent Labs   Lab Test 04/26/17 12/05/16   WBC  5.0  6.3   RBC  3.93  4.14   HGB  12.1  12.7   HCT  36.9  37.8   MCV  94  91   MCH  30.8  30.7   MCHC  32.8  33.6   RDW  13.3  13.8   PLT  243  238       Last Basic Metabolic Panel:  Recent Labs   Lab Test 04/26/17 12/05/16   NA  140  141   POTASSIUM  4.1  4.0   CHLORIDE  106  106   BOLA  9.1  9.2   CO2  27  27   BUN  30  30   CR  0.65  0.76   GLC  75  78       Liver Function Studies -   Recent Labs   Lab Test 04/26/17 12/05/16   PROTTOTAL  6.2*  6.5   ALBUMIN  3.1*  3.2   BILITOTAL  0.4  0.6   ALKPHOS  123  143   AST  22  22   ALT  18  15       TSH   Date Value Ref Range Status   04/26/2017 1.89 0.40 - 4.00 mU/L Final       Assessment/Plan:  (E03.9) Acquired hypothyroidism  (primary encounter diagnosis)  Comment: Chronic and stable  Plan:  -Annual TSH  -Continue levothyroxine 75 mcg po daily    (Z86.73) H/O: CVA (cerebrovascular accident)  (F01.50) Vascular dementia without behavioral disturbance  Comment: Hx of with general decline  Plan:   -Supportive cares from staff  -ASA 81 mg po daily    (R21) Rash and nonspecific skin eruption  Comment: Hx of and chronic pruritus    Plan:   -Daily skin moisturizer  -hydroxyzine 10 mg po daily at HS     (R62.7) Adult failure to thrive  Comment:ONgoing with slow general decline  Plan:  -Tylenol and lidocaine ointment for general discomfort     (I11.9) Hypertensive heart disease without heart failure  Comment: Hx of but well controlled  Plan:  -Lopressor 12.5 mg po daily  -Monthly VS and weights from facility  -BMP        Electronically signed by  HIGINIO Pena CNP                      Sincerely,        HIGINIO Pena CNP

## 2018-05-10 NOTE — PROGRESS NOTES
[facility]    Chief Complaint   Patient presents with     PARVIZ       [facility] Medical Record Number:  [id number]    HPI:    April Donnelly is a 88 year old  (1/30/1930), who is being seen today for an episodic care visit at [facility] information obtained from: facility chart records.Today's concern is:    Acquired hypothyroidism  Continues on replacement. Due for TSH lab. Hx of goiter removed.     H/O: CVA (cerebrovascular accident)  Vascular dementia without behavioral disturbance  Baseline weakness and now using EZ stand for transfers. Relies on staff for ADLs    Rash and nonspecific skin eruption  Scratching and picking behaviors per son is baseline. Staff reports reddened area around scab on left lower arm with infection potential. Slight redness seen today on exam, no swelling or warmth to area. No indicators of pain with exam.    Adult failure to thrive  Slow ongoing decline but did not meet critter for hospice admission. Did have fall resulting in lower back pain.    Hypertensive heart disease without heart failure  BP average 135/72 HR 67.     ALLERGIES: Hydrocodone; Lisinopril; Metoprolol; and Terazosin  Past Medical, Surgical, Family and Social History reviewed and updated in [facility].    Current Outpatient Prescriptions   Medication Sig Dispense Refill     Acetaminophen (TYLENOL PO) Take 1,000 mg by mouth 2 times daily        ACETAMINOPHEN PO Take 500 mg by mouth 2 times daily as needed for pain       aspirin 81 MG EC tablet Take 1 tablet (81 mg) by mouth daily 31 tablet PRN     bacitracin-polymyxin b (POLYSPORIN) ointment Apply topically as needed        camphor-menthol (DERMASARRA) 0.5-0.5 % LOTN Apply topically 2 times daily as needed for skin care       camphor-menthol (DERMASARRA) 0.5-0.5 % LOTN Apply topically At Bedtime       citalopram (CELEXA) 10 MG tablet Take 1 tablet (10 mg) by mouth daily 31 tablet 98     hydrocortisone 2.5 % cream Ap top to reddened area prn  30 g 98     HYDROXYZINE HCL PO Take 10 mg by mouth daily       levothyroxine (LEVOTHROID) 75 MCG tablet Take 1 tablet (75 mcg) by mouth daily 31 tablet PRN     lidocaine (XYLOCAINE) 5 % ointment Apply topically daily       Menthol-Zinc Oxide (CALMOSEPTINE) 0.44-20.6 % OINT Externally apply 71 g topically 2 times daily APPLY TOPICALLY TO COCCYX TWICE DAILY AND WITH ALL INCONTINENT CHANGES FOR SKIN ITCH AND BREAKDOWN 113 g 11     metoprolol tartrate (LOPRESSOR) 25 MG tablet Take 0.5 tablets (12.5 mg) by mouth daily 16 tablet 98     Medications reviewed:  Medications reconciled to facility chart and changes were made to reflect current medications as identified as above med list. Below are the changes that were made:   Medications stopped since last EPIC medication reconciliation:   Medications Discontinued During This Encounter   Medication Reason     camphor-menthol (DERMASARRA) 0.5-0.5 % LOTN Medication Reconciliation Clean Up       Medications started since last [facility] medication reconciliation:  No orders of the defined types were placed in this encounter.    REVIEW OF SYSTEMS:  Unobtainable secondary to cognitive impairment.     Physical Exam:  /79  Pulse 70  Temp 97.3  F (36.3  C)  Resp 16  SpO2 97%  GENERAL APPEARANCE:  Alert, in no distress, siting in wheelchair  ENT:  Mouth and posterior oropharynx normal, dry mucous membranes, [place]  EYES:  EOM, conjunctivae, lids, pupils and irises normal  NECK:  No adenopathy,masses or thyromegaly  RESP:  respiratory effort and palpation of chest normal, lungs clear to auscultation   CV:  Palpation and auscultation of heart done , no edema, rate-normal, grade 2/6 murmur, irregular rhythm   ABDOMEN:  normal bowel sounds, soft, nontender, no hepatosplenomegaly or other masses  M/S:   wheelchair is baseline, EZ Stand for transfers  SKIN: Overall intact few areas with scabs, slight redness around, scarring marks from previous scratching  NEURO:   Examination of sensation  by touch normal  PSYCH:  insight and judgement impaired, memory impaired        Recent Labs:     CBC RESULTS:   Recent Labs   Lab Test 04/26/17 12/05/16   WBC  5.0  6.3   RBC  3.93  4.14   HGB  12.1  12.7   HCT  36.9  37.8   MCV  94  91   MCH  30.8  30.7   MCHC  32.8  33.6   RDW  13.3  13.8   PLT  243  238       Last Basic Metabolic Panel:  Recent Labs   Lab Test 04/26/17 12/05/16   NA  140  141   POTASSIUM  4.1  4.0   CHLORIDE  106  106   BOLA  9.1  9.2   CO2  27  27   BUN  30  30   CR  0.65  0.76   GLC  75  78       Liver Function Studies -   Recent Labs   Lab Test 04/26/17 12/05/16   PROTTOTAL  6.2*  6.5   ALBUMIN  3.1*  3.2   BILITOTAL  0.4  0.6   ALKPHOS  123  143   AST  22  22   ALT  18  15       TSH   Date Value Ref Range Status   04/26/2017 1.89 0.40 - 4.00 mU/L Final       Assessment/Plan:  (E03.9) Acquired hypothyroidism  (primary encounter diagnosis)  Comment: Chronic and stable  Plan:  -Annual TSH  -Continue levothyroxine 75 mcg po daily    (Z86.73) H/O: CVA (cerebrovascular accident)  (F01.50) Vascular dementia without behavioral disturbance  Comment: Hx of with general decline  Plan:   -Supportive cares from staff  -ASA 81 mg po daily    (R21) Rash and nonspecific skin eruption  Comment: Hx of and chronic pruritus    Plan:   -Daily skin moisturizer  -hydroxyzine 10 mg po daily at HS     (R62.7) Adult failure to thrive  Comment:ONgoing with slow general decline  Plan:  -Tylenol and lidocaine ointment for general discomfort     (I11.9) Hypertensive heart disease without heart failure  Comment: Hx of but well controlled  Plan:  -Lopressor 12.5 mg po daily  -Monthly VS and weights from facility  -BMP        Electronically signed by  HIGINIO Pena CNP

## 2018-05-12 ENCOUNTER — TRANSFERRED RECORDS (OUTPATIENT)
Dept: HEALTH INFORMATION MANAGEMENT | Facility: CLINIC | Age: 83
End: 2018-05-12

## 2018-05-12 LAB
ANION GAP SERPL CALCULATED.3IONS-SCNC: 5 MMOL/L (ref 3–14)
BUN SERPL-MCNC: 21 MG/DL (ref 7–30)
CALCIUM SERPL-MCNC: 9.1 MG/DL (ref 8.5–10.1)
CHLORIDE SERPLBLD-SCNC: 107 MMOL/L (ref 94–109)
CO2 SERPL-SCNC: 29 MMOL/L (ref 20–32)
CREAT SERPL-MCNC: 0.8 MG/DL (ref 0.52–1.04)
ERYTHROCYTE [DISTWIDTH] IN BLOOD BY AUTOMATED COUNT: 13.7 % (ref 10–15)
GFR SERPL CREATININE-BSD FRML MDRD: 68 ML/MIN/1.73M2
GLUCOSE SERPL-MCNC: 109 MG/DL (ref 70–99)
HCT VFR BLD AUTO: 40.4 % (ref 35–47)
HEMOGLOBIN: 13.3 G/DL (ref 11.7–15.7)
MCH RBC QN AUTO: 30 PG (ref 26.5–33)
MCHC RBC AUTO-ENTMCNC: 32.9 G/DL (ref 31.5–36.5)
MCV RBC AUTO: 91 FL (ref 78–100)
PLATELET # BLD AUTO: 252 10E9/L (ref 150–450)
POTASSIUM SERPL-SCNC: 3.7 MMOL/L (ref 3.4–5.3)
RBC # BLD AUTO: 4.43 10E12/L (ref 3.8–5.2)
SODIUM SERPL-SCNC: 141 MMOL/L (ref 133–144)
WBC # BLD AUTO: 6.5 10E9/L (ref 4–11)

## 2018-05-16 ENCOUNTER — TRANSFERRED RECORDS (OUTPATIENT)
Dept: HEALTH INFORMATION MANAGEMENT | Facility: CLINIC | Age: 83
End: 2018-05-16

## 2018-07-05 ENCOUNTER — ASSISTED LIVING VISIT (OUTPATIENT)
Dept: GERIATRICS | Facility: CLINIC | Age: 83
End: 2018-07-05
Payer: MEDICARE

## 2018-07-05 VITALS
RESPIRATION RATE: 16 BRPM | WEIGHT: 114.3 LBS | HEART RATE: 57 BPM | DIASTOLIC BLOOD PRESSURE: 81 MMHG | BODY MASS INDEX: 20.91 KG/M2 | TEMPERATURE: 97.3 F | SYSTOLIC BLOOD PRESSURE: 120 MMHG

## 2018-07-05 DIAGNOSIS — R21 RASH AND NONSPECIFIC SKIN ERUPTION: ICD-10-CM

## 2018-07-05 DIAGNOSIS — F43.23 ADJUSTMENT DISORDER WITH MIXED ANXIETY AND DEPRESSED MOOD: ICD-10-CM

## 2018-07-05 DIAGNOSIS — I69.359 HEMIPARESIS FOLLOWING CEREBROVASCULAR ACCIDENT (CVA) (H): Primary | ICD-10-CM

## 2018-07-05 DIAGNOSIS — I11.9 HYPERTENSIVE HEART DISEASE WITHOUT HEART FAILURE: ICD-10-CM

## 2018-07-05 DIAGNOSIS — F01.50 VASCULAR DEMENTIA WITHOUT BEHAVIORAL DISTURBANCE (H): ICD-10-CM

## 2018-07-05 DIAGNOSIS — E03.9 ACQUIRED HYPOTHYROIDISM: ICD-10-CM

## 2018-07-05 NOTE — LETTER
7/5/2018        RE: April Donnelly  [facility]  [address] [address]        [facility]    Chief Complaint   Patient presents with     RICHARDECK       [place] Medical Record Number:  [id number]    HPI:    April Donnelly is a 88 year old  (1/30/1930), who is being seen today for an episodic care visit at [facility].  HPI information obtained from: facility chart records, facility staff, patient report and [facility] chart review.Today's concern is:     Hemiparesis following cerebrovascular accident (CVA) (H)  Ongoing weakness and poor spatial planning but is able to transfer with EZ stand and assist.    Vascular dementia without behavioral disturbance  Able to have superficial conversation at times. Is more comfortable in conversation with her ongoing caregivers.     Adjustment disorder with mixed anxiety and depressed mood  Pleasant and calm. No concerns from staff.    Rash and nonspecific skin eruption  Scratching behaviors have improved. No open areas per staff.    Hypertensive heart disease without heart failure  Continues on ASA and low dose metoprolol. BP/heart rate are within normal limits.    Acquired hypothyroidism   Continues on replacement. Last check she is tightly controlled with TSH.    ALLERGIES: Hydrocodone; Lisinopril; Metoprolol; and Terazosin  Past Medical, Surgical, Family and Social History reviewed and updated in [facility].    Current Outpatient Prescriptions   Medication Sig Dispense Refill     Acetaminophen (TYLENOL PO) Take 1,000 mg by mouth 2 times daily        ACETAMINOPHEN PO Take 500 mg by mouth 2 times daily as needed for pain       aspirin 81 MG EC tablet Take 1 tablet (81 mg) by mouth daily 31 tablet PRN     camphor-menthol (DERMASARRA) 0.5-0.5 % LOTN Apply lotion to body daily 1 Bottle 11     citalopram (CELEXA) 10 MG tablet Take 1 tablet (10 mg) by mouth daily 31 tablet 98     HYDROXYZINE HCL PO Take 10 mg by mouth daily        levothyroxine (LEVOTHROID) 75 MCG tablet Take 1 tablet (75 mcg) by mouth daily 31 tablet PRN     lidocaine (XYLOCAINE) 5 % ointment Apply topically daily       Menthol-Zinc Oxide (CALMOSEPTINE) 0.44-20.6 % OINT Externally apply 71 g topically 2 times daily APPLY TOPICALLY TO COCCYX TWICE DAILY AND WITH ALL INCONTINENT CHANGES FOR SKIN ITCH AND BREAKDOWN 113 g 11     metoprolol tartrate (LOPRESSOR) 25 MG tablet Take 0.5 tablets (12.5 mg) by mouth daily 16 tablet 98     [DISCONTINUED] camphor-menthol (DERMASARRA) 0.5-0.5 % LOTN Apply topically 2 times daily as needed for skin care       [DISCONTINUED] camphor-menthol (DERMASARRA) 0.5-0.5 % LOTN Apply topically At Bedtime       Patient Active Problem List   Diagnosis     Cerebrovascular accident (H)     Hemorrhagic stroke (H)     Syncope     Adjustment disorder with mixed anxiety and depressed mood     Hypertensive heart disease without heart failure     Advance care planning     Closed nondisplaced fracture of pelvis with routine healing, unspecified part of pelvis, subsequent encounter     Vascular dementia without behavioral disturbance     Acquired hypothyroidism     Rash and nonspecific skin eruption     Hemiparesis following cerebrovascular accident (CVA) (H)       Medications reviewed:  Medications reconciled to facility chart and changes were made to reflect current medications as identified as above med list. Below are the changes that were made:   Medications stopped since last EPIC medication reconciliation:   There are no discontinued medications.    Medications started since last [facility] medication reconciliation:  No orders of the defined types were placed in this encounter.    REVIEW OF SYSTEMS:  Limited secondary to cognitive impairment but today pt reports ok    Physical Exam:  /81  Pulse 57  Temp 97.3  F (36.3  C)  Resp 16  Wt 114 lb 4.8 oz (51.8 kg)  BMI 20.91 kg/m2  GENERAL APPEARANCE:  Alert, in no distress, siting in wheelchair  ENT:   Mouth and posterior oropharynx normal, dry mucous membranes, [place]  EYES:  EOM, conjunctivae, lids, pupils and irises normal  NECK:  No adenopathy,masses or thyromegaly  RESP:  respiratory effort and palpation of chest normal, lungs clear to auscultation   CV:  Palpation and auscultation of heart done , trace edema, rate-normal, grade 2/6 murmur, irregular rhythm   ABDOMEN:  normal bowel sounds, soft, nontender, no hepatosplenomegaly or other masses  M/S:   wheelchair is baseline, EZ Stand for transfers  SKIN: Overall intact few areas with scabs, slight redness around, scarring marks from previous scratching  NEURO:   Examination of sensation by touch normal  PSYCH:  insight and judgement impaired, memory impaired     Recent Labs:     CBC RESULTS:   Recent Labs   Lab Test 05/12/18 04/26/17   WBC  6.5  5.0   RBC  4.43  3.93   HGB  13.3  12.1   HCT  40.4  36.9   MCV  91  94   MCH  30.0  30.8   MCHC  32.9  32.8   RDW  13.7  13.3   PLT  252  243       Last Basic Metabolic Panel:  Recent Labs   Lab Test 05/12/18 04/26/17   NA  141  140   POTASSIUM  3.7  4.1   CHLORIDE  107  106   BOLA  9.1  9.1   CO2  29  27   BUN  21  30   CR  0.80  0.65   GLC  109*  75       Liver Function Studies -   Recent Labs   Lab Test 04/26/17 12/05/16   PROTTOTAL  6.2*  6.5   ALBUMIN  3.1*  3.2   BILITOTAL  0.4  0.6   ALKPHOS  123  143   AST  22  22   ALT  18  15       TSH   Date Value Ref Range Status   04/26/2017 1.89 0.40 - 4.00 mU/L Final     TSH 05/12/18    0.63    Assessment/Plan:  (I69.359) Hemiparesis following cerebrovascular accident (CVA) (H)  (primary encounter diagnosis)  (F01.50) Vascular dementia without behavioral disturbance  Comment: Chronic and stable  Plan:   -Supportive cares and staff assist for most ADLs. She is able to feed herself  -Resides on memory care unit which is appropriate     (F43.23) Adjustment disorder with mixed anxiety and depressed mood  Comment: Stable  Plan:   -Citalopram 10 mg po qd  -Consider dose  reduction with decline    (R21) Rash and nonspecific skin eruption  Comment: Chronic and stable with history of picking behaviors  Plan:   -Jeaneth lotion daily  -hydroxyzine 10 mg po qd  -Calmoseptine to coccyx with incontinent changes    (I11.9) Hypertensive heart disease without heart failure  Comment: Chronic and stable  Plan:   -Metoprolol 12.5 mg po qd  -ASA 81 mg po daily    (E03.9) Acquired hypothyroidism  Comment: On replacement   Plan:   -Reduced Levothyroxine from 75 mcg po qd to 50 mcg po qd  -Recheck TSH in 6 weeks      Electronically signed by  HIGINIO Pena CNP                      Sincerely,        HIGINIO Pena CNP

## 2018-07-05 NOTE — PROGRESS NOTES
[place] [facility] [facility]    Chief Complaint   Patient presents with     PARVIZ       [place] Medical Record Number:  [id number]    HPI:    April Donnelly is a 88 year old  (1/30/1930), who is being seen today for an episodic care visit at [facility].  HPI information obtained from: facility chart records, facility staff, patient report and [facility] chart review.Today's concern is:     Hemiparesis following cerebrovascular accident (CVA) (H)  Ongoing weakness and poor spatial planning but is able to transfer with EZ stand and assist.    Vascular dementia without behavioral disturbance  Able to have superficial conversation at times. Is more comfortable in conversation with her ongoing caregivers.     Adjustment disorder with mixed anxiety and depressed mood  Pleasant and calm. No concerns from staff.    Rash and nonspecific skin eruption  Scratching behaviors have improved. No open areas per staff.    Hypertensive heart disease without heart failure  Continues on ASA and low dose metoprolol. BP/heart rate are within normal limits.    Acquired hypothyroidism   Continues on replacement. Last check she is tightly controlled with TSH.    ALLERGIES: Hydrocodone; Lisinopril; Metoprolol; and Terazosin  Past Medical, Surgical, Family and Social History reviewed and updated in [facility].    Current Outpatient Prescriptions   Medication Sig Dispense Refill     Acetaminophen (TYLENOL PO) Take 1,000 mg by mouth 2 times daily        ACETAMINOPHEN PO Take 500 mg by mouth 2 times daily as needed for pain       aspirin 81 MG EC tablet Take 1 tablet (81 mg) by mouth daily 31 tablet PRN     camphor-menthol (DERMASARRA) 0.5-0.5 % LOTN Apply lotion to body daily 1 Bottle 11     citalopram (CELEXA) 10 MG tablet Take 1 tablet (10 mg) by mouth daily 31 tablet 98     HYDROXYZINE HCL PO Take 10 mg by mouth daily       levothyroxine (LEVOTHROID) 75 MCG tablet Take 1 tablet (75 mcg) by mouth daily 31 tablet PRN     lidocaine (XYLOCAINE) 5  % ointment Apply topically daily       Menthol-Zinc Oxide (CALMOSEPTINE) 0.44-20.6 % OINT Externally apply 71 g topically 2 times daily APPLY TOPICALLY TO COCCYX TWICE DAILY AND WITH ALL INCONTINENT CHANGES FOR SKIN ITCH AND BREAKDOWN 113 g 11     metoprolol tartrate (LOPRESSOR) 25 MG tablet Take 0.5 tablets (12.5 mg) by mouth daily 16 tablet 98     [DISCONTINUED] camphor-menthol (DERMASARRA) 0.5-0.5 % LOTN Apply topically 2 times daily as needed for skin care       [DISCONTINUED] camphor-menthol (DERMASARRA) 0.5-0.5 % LOTN Apply topically At Bedtime       Patient Active Problem List   Diagnosis     Cerebrovascular accident (H)     Hemorrhagic stroke (H)     Syncope     Adjustment disorder with mixed anxiety and depressed mood     Hypertensive heart disease without heart failure     Advance care planning     Closed nondisplaced fracture of pelvis with routine healing, unspecified part of pelvis, subsequent encounter     Vascular dementia without behavioral disturbance     Acquired hypothyroidism     Rash and nonspecific skin eruption     Hemiparesis following cerebrovascular accident (CVA) (H)       Medications reviewed:  Medications reconciled to facility chart and changes were made to reflect current medications as identified as above med list. Below are the changes that were made:   Medications stopped since last EPIC medication reconciliation:   There are no discontinued medications.    Medications started since last [facility] medication reconciliation:  No orders of the defined types were placed in this encounter.    REVIEW OF SYSTEMS:  Limited secondary to cognitive impairment but today pt reports ok    Physical Exam:  /81  Pulse 57  Temp 97.3  F (36.3  C)  Resp 16  Wt 114 lb 4.8 oz (51.8 kg)  BMI 20.91 kg/m2  GENERAL APPEARANCE:  Alert, in no distress, siting in wheelchair  ENT:  Mouth and posterior oropharynx normal, dry mucous membranes, [place]  EYES:  EOM, conjunctivae, lids, pupils and irises  normal  NECK:  No adenopathy,masses or thyromegaly  RESP:  respiratory effort and palpation of chest normal, lungs clear to auscultation   CV:  Palpation and auscultation of heart done , trace edema, rate-normal, grade 2/6 murmur, irregular rhythm   ABDOMEN:  normal bowel sounds, soft, nontender, no hepatosplenomegaly or other masses  M/S:   wheelchair is baseline, EZ Stand for transfers  SKIN: Overall intact few areas with scabs, slight redness around, scarring marks from previous scratching  NEURO:   Examination of sensation by touch normal  PSYCH:  insight and judgement impaired, memory impaired     Recent Labs:     CBC RESULTS:   Recent Labs   Lab Test 05/12/18 04/26/17   WBC  6.5  5.0   RBC  4.43  3.93   HGB  13.3  12.1   HCT  40.4  36.9   MCV  91  94   MCH  30.0  30.8   MCHC  32.9  32.8   RDW  13.7  13.3   PLT  252  243       Last Basic Metabolic Panel:  Recent Labs   Lab Test 05/12/18 04/26/17   NA  141  140   POTASSIUM  3.7  4.1   CHLORIDE  107  106   BOLA  9.1  9.1   CO2  29  27   BUN  21  30   CR  0.80  0.65   GLC  109*  75       Liver Function Studies -   Recent Labs   Lab Test 04/26/17 12/05/16   PROTTOTAL  6.2*  6.5   ALBUMIN  3.1*  3.2   BILITOTAL  0.4  0.6   ALKPHOS  123  143   AST  22  22   ALT  18  15       TSH   Date Value Ref Range Status   04/26/2017 1.89 0.40 - 4.00 mU/L Final     TSH 05/12/18    0.63    Assessment/Plan:  (I69.359) Hemiparesis following cerebrovascular accident (CVA) (H)  (primary encounter diagnosis)  (F01.50) Vascular dementia without behavioral disturbance  Comment: Chronic and stable  Plan:   -Supportive cares and staff assist for most ADLs. She is able to feed herself  -Resides on memory care unit which is appropriate     (F43.23) Adjustment disorder with mixed anxiety and depressed mood  Comment: Stable  Plan:   -Citalopram 10 mg po qd  -Consider dose reduction with decline    (R21) Rash and nonspecific skin eruption  Comment: Chronic and stable with history of picking  behaviors  Plan:   -Jeaneth lotion daily  -hydroxyzine 10 mg po qd  -Calmoseptine to coccyx with incontinent changes    (I11.9) Hypertensive heart disease without heart failure  Comment: Chronic and stable  Plan:   -Metoprolol 12.5 mg po qd  -ASA 81 mg po daily    (E03.9) Acquired hypothyroidism  Comment: On replacement   Plan:   -Reduced Levothyroxine from 75 mcg po qd to 50 mcg po qd  -Recheck TSH in 6 weeks      Electronically signed by  HIGINIO Pena CNP

## 2018-08-04 DIAGNOSIS — E03.8 OTHER SPECIFIED HYPOTHYROIDISM: ICD-10-CM

## 2018-08-05 RX ORDER — LEVOTHYROXINE SODIUM 50 UG/1
TABLET ORAL
Qty: 31 TABLET | Refills: 97 | Status: SHIPPED | OUTPATIENT
Start: 2018-08-05 | End: 2019-08-26

## 2018-08-22 ENCOUNTER — APPOINTMENT (OUTPATIENT)
Dept: GENERAL RADIOLOGY | Facility: CLINIC | Age: 83
DRG: 470 | End: 2018-08-22
Attending: EMERGENCY MEDICINE
Payer: MEDICARE

## 2018-08-22 ENCOUNTER — HOSPITAL ENCOUNTER (INPATIENT)
Facility: CLINIC | Age: 83
LOS: 6 days | Discharge: NURSING, REHAB OR LONG-TERM CARE | DRG: 470 | End: 2018-08-28
Attending: EMERGENCY MEDICINE | Admitting: INTERNAL MEDICINE
Payer: MEDICARE

## 2018-08-22 DIAGNOSIS — I48.91 ATRIAL FIBRILLATION, UNSPECIFIED TYPE (H): ICD-10-CM

## 2018-08-22 DIAGNOSIS — L29.9 ITCHING: ICD-10-CM

## 2018-08-22 DIAGNOSIS — I10 BENIGN ESSENTIAL HYPERTENSION: ICD-10-CM

## 2018-08-22 DIAGNOSIS — S32.010A CLOSED COMPRESSION FRACTURE OF FIRST LUMBAR VERTEBRA, INITIAL ENCOUNTER: ICD-10-CM

## 2018-08-22 DIAGNOSIS — I48.0 PAROXYSMAL ATRIAL FIBRILLATION (H): Primary | ICD-10-CM

## 2018-08-22 DIAGNOSIS — S72.001A CLOSED FRACTURE OF NECK OF RIGHT FEMUR, INITIAL ENCOUNTER (H): ICD-10-CM

## 2018-08-22 DIAGNOSIS — N39.0 UTI (URINARY TRACT INFECTION) WITH PYURIA: ICD-10-CM

## 2018-08-22 LAB
ALBUMIN UR-MCNC: NEGATIVE MG/DL
ANION GAP SERPL CALCULATED.3IONS-SCNC: 3 MMOL/L (ref 3–14)
APPEARANCE UR: ABNORMAL
BACTERIA #/AREA URNS HPF: ABNORMAL /HPF
BASOPHILS # BLD AUTO: 0.1 10E9/L (ref 0–0.2)
BASOPHILS NFR BLD AUTO: 0.8 %
BILIRUB UR QL STRIP: NEGATIVE
BUN SERPL-MCNC: 27 MG/DL (ref 7–30)
CALCIUM SERPL-MCNC: 8.4 MG/DL (ref 8.5–10.1)
CHLORIDE SERPL-SCNC: 108 MMOL/L (ref 94–109)
CO2 SERPL-SCNC: 27 MMOL/L (ref 20–32)
COLOR UR AUTO: YELLOW
CREAT SERPL-MCNC: 0.59 MG/DL (ref 0.52–1.04)
DIFFERENTIAL METHOD BLD: NORMAL
EOSINOPHIL # BLD AUTO: 0.6 10E9/L (ref 0–0.7)
EOSINOPHIL NFR BLD AUTO: 9.2 %
ERYTHROCYTE [DISTWIDTH] IN BLOOD BY AUTOMATED COUNT: 12.8 % (ref 10–15)
GFR SERPL CREATININE-BSD FRML MDRD: >90 ML/MIN/1.7M2
GLUCOSE SERPL-MCNC: 93 MG/DL (ref 70–99)
GLUCOSE UR STRIP-MCNC: NEGATIVE MG/DL
HCT VFR BLD AUTO: 42.2 % (ref 35–47)
HGB BLD-MCNC: 13.8 G/DL (ref 11.7–15.7)
HGB UR QL STRIP: ABNORMAL
IMM GRANULOCYTES # BLD: 0 10E9/L (ref 0–0.4)
IMM GRANULOCYTES NFR BLD: 0.5 %
KETONES UR STRIP-MCNC: NEGATIVE MG/DL
LEUKOCYTE ESTERASE UR QL STRIP: ABNORMAL
LYMPHOCYTES # BLD AUTO: 1.7 10E9/L (ref 0.8–5.3)
LYMPHOCYTES NFR BLD AUTO: 25.9 %
MCH RBC QN AUTO: 32.2 PG (ref 26.5–33)
MCHC RBC AUTO-ENTMCNC: 32.7 G/DL (ref 31.5–36.5)
MCV RBC AUTO: 99 FL (ref 78–100)
MONOCYTES # BLD AUTO: 0.5 10E9/L (ref 0–1.3)
MONOCYTES NFR BLD AUTO: 7.2 %
MUCOUS THREADS #/AREA URNS LPF: PRESENT /LPF
NEUTROPHILS # BLD AUTO: 3.7 10E9/L (ref 1.6–8.3)
NEUTROPHILS NFR BLD AUTO: 56.4 %
NITRATE UR QL: POSITIVE
NRBC # BLD AUTO: 0 10*3/UL
NRBC BLD AUTO-RTO: 0 /100
PH UR STRIP: 6 PH (ref 5–7)
PLATELET # BLD AUTO: 259 10E9/L (ref 150–450)
POTASSIUM SERPL-SCNC: 3.9 MMOL/L (ref 3.4–5.3)
POTASSIUM SERPL-SCNC: ABNORMAL MMOL/L (ref 3.4–5.3)
RBC # BLD AUTO: 4.28 10E12/L (ref 3.8–5.2)
RBC #/AREA URNS AUTO: 7 /HPF (ref 0–2)
SODIUM SERPL-SCNC: 138 MMOL/L (ref 133–144)
SOURCE: ABNORMAL
SP GR UR STRIP: 1.02 (ref 1–1.03)
SQUAMOUS #/AREA URNS AUTO: 1 /HPF (ref 0–1)
TROPONIN I SERPL-MCNC: <0.015 UG/L (ref 0–0.04)
UROBILINOGEN UR STRIP-MCNC: 0 MG/DL (ref 0–2)
WBC # BLD AUTO: 6.6 10E9/L (ref 4–11)
WBC #/AREA URNS AUTO: >182 /HPF (ref 0–5)

## 2018-08-22 PROCEDURE — 96374 THER/PROPH/DIAG INJ IV PUSH: CPT

## 2018-08-22 PROCEDURE — 85025 COMPLETE CBC W/AUTO DIFF WBC: CPT | Performed by: EMERGENCY MEDICINE

## 2018-08-22 PROCEDURE — 80048 BASIC METABOLIC PNL TOTAL CA: CPT | Performed by: EMERGENCY MEDICINE

## 2018-08-22 PROCEDURE — 73502 X-RAY EXAM HIP UNI 2-3 VIEWS: CPT

## 2018-08-22 PROCEDURE — 87186 SC STD MICRODIL/AGAR DIL: CPT | Performed by: EMERGENCY MEDICINE

## 2018-08-22 PROCEDURE — 72100 X-RAY EXAM L-S SPINE 2/3 VWS: CPT

## 2018-08-22 PROCEDURE — 87088 URINE BACTERIA CULTURE: CPT | Performed by: EMERGENCY MEDICINE

## 2018-08-22 PROCEDURE — 87086 URINE CULTURE/COLONY COUNT: CPT | Performed by: EMERGENCY MEDICINE

## 2018-08-22 PROCEDURE — 99285 EMERGENCY DEPT VISIT HI MDM: CPT | Mod: 25

## 2018-08-22 PROCEDURE — 93005 ELECTROCARDIOGRAM TRACING: CPT

## 2018-08-22 PROCEDURE — 25000128 H RX IP 250 OP 636: Performed by: EMERGENCY MEDICINE

## 2018-08-22 PROCEDURE — 81001 URINALYSIS AUTO W/SCOPE: CPT | Performed by: EMERGENCY MEDICINE

## 2018-08-22 PROCEDURE — 36415 COLL VENOUS BLD VENIPUNCTURE: CPT | Performed by: EMERGENCY MEDICINE

## 2018-08-22 PROCEDURE — 99233 SBSQ HOSP IP/OBS HIGH 50: CPT | Performed by: INTERNAL MEDICINE

## 2018-08-22 PROCEDURE — 12000000 ZZH R&B MED SURG/OB

## 2018-08-22 PROCEDURE — 84484 ASSAY OF TROPONIN QUANT: CPT | Performed by: EMERGENCY MEDICINE

## 2018-08-22 PROCEDURE — 96375 TX/PRO/DX INJ NEW DRUG ADDON: CPT

## 2018-08-22 PROCEDURE — 84132 ASSAY OF SERUM POTASSIUM: CPT | Performed by: EMERGENCY MEDICINE

## 2018-08-22 RX ORDER — CEFTRIAXONE 1 G/1
1 INJECTION, POWDER, FOR SOLUTION INTRAMUSCULAR; INTRAVENOUS ONCE
Status: COMPLETED | OUTPATIENT
Start: 2018-08-22 | End: 2018-08-22

## 2018-08-22 RX ORDER — HYDROMORPHONE HYDROCHLORIDE 1 MG/ML
0.5 INJECTION, SOLUTION INTRAMUSCULAR; INTRAVENOUS; SUBCUTANEOUS ONCE
Status: DISCONTINUED | OUTPATIENT
Start: 2018-08-22 | End: 2018-08-24

## 2018-08-22 RX ORDER — HYDROMORPHONE HCL/0.9% NACL/PF 0.2MG/0.2
0.2 SYRINGE (ML) INTRAVENOUS ONCE
Status: COMPLETED | OUTPATIENT
Start: 2018-08-22 | End: 2018-08-22

## 2018-08-22 RX ORDER — MORPHINE SULFATE 2 MG/ML
2 INJECTION, SOLUTION INTRAMUSCULAR; INTRAVENOUS ONCE
Status: COMPLETED | OUTPATIENT
Start: 2018-08-22 | End: 2018-08-22

## 2018-08-22 RX ADMIN — Medication 0.2 MG: at 23:51

## 2018-08-22 RX ADMIN — MORPHINE SULFATE 2 MG: 2 INJECTION, SOLUTION INTRAMUSCULAR; INTRAVENOUS at 23:07

## 2018-08-22 RX ADMIN — CEFTRIAXONE SODIUM 1 G: 1 INJECTION, POWDER, FOR SOLUTION INTRAMUSCULAR; INTRAVENOUS at 23:10

## 2018-08-22 NOTE — IP AVS SNAPSHOT
` `     [facility] ORTHO SPINE: [phone]            Medication Administration Report for April Donnelly as of 18 1156   Legend:    Given Hold Not Given Due Canceled Entry Other Actions    Time Time (Time) Time  Time-Action       Inactive    Active    Linked        Medications 18    acetaminophen (TYLENOL) tablet 650 mg  Dose: 650 mg  Freq: EVERY 4 HOURS PRN Route: PO  PRN Reason: other  PRN Comment: multimodal surgical pain management along with NSAIDS and opioid medication as indicated based on pain control and physical function.  Start: 18 0000   Admin Instructions: May give first dose 4 hours after last scheduled dose of acetaminophen.  Maximum acetaminophen dose from all sources = 75 mg/kg/day not to exceed 4 grams/day.    Admin. Amount: 2 tablet (2 × 325 mg tablet)  Dispense Loc:  [facility]  POC: Post-procedure                 Dose: 975 mg  Freq: EVERY 8 HOURS Route: PO  Start: 18 1645   End: 18 165   Admin Instructions: Do not use if patient has an active opioid/acetaminophen combined analgesic product ordered for pain.  Maximum acetaminophen dose from all sources = 75 mg/kg/day not to exceed 4 grams/day.    Admin. Amount: 3 tablet (3 × 325 mg tablet)  Last Admin: 18 0925  Dispense Loc:  [facility]  Administrations Remainin  POC: Post-procedure       (1710)-Not Given [C]        (0057)-Not Given       0851 (975 mg)-Given       1825 (975 mg)-Given        0047 (975 mg)-Given       0849 (975 mg)-Given       1845 (975 mg)-Given        0011 (975 mg)-Given       0925 (975 mg)-Given       1659-Med Discontinued        ammonium lactate (LAC-HYDRIN) 12 % lotion  Freq: 4 TIMES DAILY PRN Route: Top  PRN Reason: dry skin  Start: 18 1340   Admin Instructions: Apply to pruritic area    Last Admin: 18 1109  Dispense Loc:  Main Pharmacy         1514 ( )-Given        1737 ( )-Given        1109 ( )-Given            benzocaine-menthol (CHLORASEPTIC) 6-10 MG lozenge 1-2 lozenge  Dose: 1-2 lozenge  Freq: EVERY 1 HOUR PRN Route: BU  PRN Reason: sore throat  PRN Comment: sore throat without fever  Start: 08/24/18 1637   Admin. Amount: 1-2 lozenge  Dispense Loc: [facility]  POC: Post-procedure               cefTRIAXone (ROCEPHIN) 1 g vial to attach to  mL bag for ADULTS or NS 50 mL bag for PEDS  Dose: 1 g  Freq: EVERY 24 HOURS Route: IV  Indications of Use: URINARY TRACT INFECTION  Start: 08/28/18 0000   Admin Instructions: Use if not accepting PO ceftin    Admin. Amount: 1 g  Last Admin: 08/28/18 0050  Dispense Loc: [facility]  Infused Over: 15-30 Minutes  Volume: 10 mL           0050 (1 g)-New Bag           cefuroxime (CEFTIN) tablet 500 mg  Dose: 500 mg  Freq: EVERY 12 HOURS SCHEDULED Route: PO  Indications of Use: URINARY TRACT INFECTION  Start: 08/25/18 2000   Admin. Amount: 2 tablet (2 × 250 mg tablet)  Last Admin: 08/27/18 0925  Dispense Loc:  [facility]        2046 (500 mg)-Given        0822 (500 mg)-Given       2108 (500 mg)-Given        0925 (500 mg)-Given       (2321)-Not Given [C]        0737-Hold       [ ] 2000           citalopram (celeXA) tablet 10 mg  Dose: 10 mg  Freq: DAILY Route: PO  Start: 08/25/18 0800   Admin. Amount: 1 tablet (1 × 10 mg tablet)  Last Admin: 08/28/18 1109  Dispense Loc: [facility]        0852 (10 mg)-Given        0822 (10 mg)-Given        0925 (10 mg)-Given        1109 (10 mg)-Given           diphenhydrAMINE (BENADRYL) injection 25 mg  Dose: 25 mg  Freq: EVERY 4 HOURS PRN Route: IV  PRN Reason: itching  PRN Comment: Limit use of IV benedryl, only give for severe itching  Start: 08/26/18 1712   Admin Instructions: For ordered doses up to 50 mg, give IV Push undiluted. Give each 25mg over a minimum of 1 minute. Extend in non-emergency    Admin. Amount: 25 mg = 0.5 mL Conc: 50 mg/mL  Last Admin: 08/28/18 0420  Dispense Loc:  [facility]  Volume: 1 mL          2239 (25 mg)-Given         0420 (25 mg)-Given           enoxaparin (LOVENOX) injection 40 mg  Dose: 40 mg  Freq: EVERY 24 HOURS Route: SC  Start: 08/25/18 0900   Admin Instructions: Check to make sure start date/time is 12-24 hours post op unless documented complication, AND no sooner than 22 hours post op if spinal anesthesia used.   Continue until discharge to home. HOLD if platelet count falls below 50% of baseline or less than 100,000/ L and notify provider.    Admin. Amount: 40 mg = 0.4 mL Conc: 40 mg/0.4 mL  Last Admin: 08/28/18 1109  Dispense Loc: [facility]  Volume: 0.4 mL  POC: Post-procedure        0901 (40 mg)-Given        0849 (40 mg)-Given        0925 (40 mg)-Given        1109 (40 mg)-Given           HYDROmorphone (DILAUDID) tablet 2 mg  Dose: 2 mg  Freq: EVERY 4 HOURS PRN Route: PO  PRN Reason: moderate to severe pain  Start: 08/26/18 1722   Admin. Amount: 1 tablet (1 × 2 mg tablet)  Dispense Loc: [facility]               HYDROmorphone (PF) (DILAUDID) injection 0.2 mg  Dose: 0.2 mg  Freq: EVERY 1 HOUR PRN Route: IV  PRN Reason: moderate to severe pain  Start: 08/27/18 2346   Admin Instructions: For ordered doses up to 4 mg give IV Push undiluted. Administer each 2mg over 2-5 minutes.    Admin. Amount: 0.2 mg  Last Admin: 08/28/18 1109  Dispense Loc: [facility]           0048 (0.2 mg)-Given       1109 (0.2 mg)-Given           hydrOXYzine (ATARAX) tablet 25 mg  Dose: 25 mg  Freq: 3 TIMES DAILY PRN Route: PO  PRN Reason: other  PRN Comment: pain  Start: 08/24/18 1637   Admin. Amount: 1 tablet (1 × 25 mg tablet)  Last Admin: 08/27/18 0011  Dispense Loc: [facility]  POC: Post-procedure        1524 (25 mg)-Given        1522 (25 mg)-Given        0011 (25 mg)-Given        (0133)-Not Given [C]           levothyroxine (SYNTHROID/LEVOTHROID) tablet 50 mcg  Dose: 50 mcg  Freq: DAILY Route: PO  Start: 08/25/18 0800   Admin Instructions: Separate oral administration of iron- or calcium-containing products and levothyroxine by at least 4  "hours.    Admin. Amount: 1 tablet (1 × 50 mcg tablet)  Last Admin: 08/28/18 1108  Dispense Loc: [facility]        0852 (50 mcg)-Given        0822 (50 mcg)-Given        0925 (50 mcg)-Given        1108 (50 mcg)-Given           lidocaine (LMX4) kit  Freq: EVERY 1 HOUR PRN Route: Top  PRN Reason: pain  PRN Comment: with VAD insertion or accessing implanted port.  Start: 08/24/18 1637   Admin Instructions: Do NOT give if patient has a history of allergy to any local anesthetic or any \"monse\" product.   Apply 30 minutes prior to VAD insertion or port access.  MAX Dose:  2.5 g (  of 5 g tube)    Dispense Loc:  Main Pharmacy  POC: Post-procedure               lidocaine 1 % 1 mL  Dose: 1 mL  Freq: EVERY 1 HOUR PRN Route: OTHER  PRN Comment: mild pain with VAD insertion or accessing implanted port  Start: 08/24/18 1637   Admin Instructions: Do NOT give if patient has a history of allergy to any local anesthetic or any \"monse\" product. MAX dose 1 mL subcutaneous OR intradermal in divided doses.    Admin. Amount: 1 mL  Dispense Loc: [facility]  Volume: 2 mL  POC: Post-procedure               metoprolol (LOPRESSOR) injection 5 mg  Dose: 5 mg  Freq: EVERY 6 HOURS PRN Route: IV  PRN Reason: high blood pressure  PRN Comment: for sbp > 170  Start: 08/27/18 2349   Admin Instructions: For ordered doses up to 15 mg, give IV Push undiluted over 5-10 minutes.    Admin. Amount: 5 mg = 5 mL Conc: 1 mg/mL  Dispense Loc: [facility]  Infused Over: 5-10 Minutes  Volume: 5 mL               metoprolol tartrate (LOPRESSOR) half-tab 12.5 mg  Dose: 12.5 mg  Freq: 2 TIMES DAILY Route: PO  Start: 08/24/18 2330   Admin. Amount: 1 half-tab (1 × 12.5 mg half-tab)  Last Admin: 08/28/18 1108  Dispense Loc: [facility]               0852 (12.5 mg)-Given       2046 (12.5 mg)-Given        0822 (12.5 mg)-Given       2108 (12.5 mg)-Given        0925 (12.5 mg)-Given       (6932)-Not Given [C]        1108 (12.5 mg)-Given       [ ] 2000           " naloxone (NARCAN) injection 0.1-0.4 mg  Dose: 0.1-0.4 mg  Freq: EVERY 2 MIN PRN Route: IV  PRN Reason: opioid reversal  Start: 08/24/18 1637   Admin Instructions: For respiratory rate LESS than or EQUAL to 8.  Partial reversal dose:  0.1 mg titrated q 2 minutes for Analgesia Side Effects Monitoring Sedation Level of 3 (frequently drowsy, arousable, drifts to sleep during conversation).Full reversal dose:  0.4 mg bolus for Analgesia Side Effects Monitoring Sedation Level of 4 (somnolent, minimal or no response to stimulation).  For ordered doses up to 2mg give IVP. Give each 0.4mg over 15 seconds in emergency situations. For non-emergent situations further dilute in 9mL of NS to facilitate titration of response.    Admin. Amount: 0.1-0.4 mg = 0.25-1 mL Conc: 0.4 mg/mL  Dispense Loc: [facility]  Volume: 1 mL  POC: Post-procedure               ondansetron (ZOFRAN-ODT) ODT tab 4 mg  Dose: 4 mg  Freq: EVERY 6 HOURS PRN Route: PO  PRN Reasons: nausea,vomiting  Start: 08/24/18 1638   Admin Instructions: This is Step 1 of nausea and vomiting management.  If nausea not resolved in 15 minutes, go to Step 2 prochlorperazine (COMPAZINE). Do not push through foil backing. Peel back foil and gently remove. Place on tongue immediately. Administration with liquid unnecessary  With dry hands, peel back foil backing and gently remove tablet; do not push oral disintegrating tablet through foil backing; administer immediately on tongue and oral disintegrating tablet dissolves in seconds; then swallow with saliva; liquid not required.    Admin. Amount: 1 tablet (1 × 4 mg tablet)  Dispense Loc: [facility]  POC: Post-procedure              Or  ondansetron (ZOFRAN) injection 4 mg  Dose: 4 mg  Freq: EVERY 6 HOURS PRN Route: IV  PRN Reasons: nausea,vomiting  Start: 08/24/18 1638   Admin Instructions: This is Step 1 of nausea and vomiting management.  If nausea not resolved in 15 minutes, go to Step 2 prochlorperazine  (COMPAZINE).  Irritant. For ordered doses up to 4 mg, give IV Push undiluted over 2-5 minutes.    Admin. Amount: 4 mg = 2 mL Conc: 4 mg/2 mL  Dispense Loc: [facility]  Infused Over: 2-5 Minutes  Volume: 2 mL  POC: Post-procedure               polyethylene glycol (MIRALAX/GLYCOLAX) Packet 17 g  Dose: 17 g  Freq: 2 TIMES DAILY PRN Route: PO  PRN Comment: constipation  Start: 08/26/18 1130   Admin Instructions: 1 Packet = 17 grams. Mixed prescribed dose in 8 ounces of water. Follow with 8 oz. of water.    Admin. Amount: 17 g  Dispense Loc: [facility]               ranitidine (ZANTAC) tablet 150 mg  Dose: 150 mg  Freq: EVERY 24 HOURS Route: PO  Start: 08/27/18 0822   Admin Instructions: freq change for CrCl of 40.4 ml/min    Admin. Amount: 1 tablet (1 × 150 mg tablet)  Last Admin: 08/28/18 1108  Dispense Loc: [facility]  POC: Post-procedure          0925 (150 mg)-Given        1108 (150 mg)-Given           sennosides (SENOKOT) tablet 1-2 tablet  Dose: 1-2 tablet  Freq: 2 TIMES DAILY PRN Route: PO  PRN Reason: constipation  Start: 08/26/18 1130   Admin. Amount: 1-2 tablet  Last Admin: 08/26/18 1522  Dispense Loc: [facility]         1522 (1 tablet)-Given             sodium chloride (PF) 0.9% PF flush 3 mL  Dose: 3 mL  Freq: EVERY 8 HOURS Route: IK  Start: 08/24/18 1645   Admin Instructions: And Q1H PRN, to lock peripheral IV dormant line.    Admin. Amount: 3 mL  Last Admin: 08/27/18 1711  Dispense Loc: [facility] Stock  Volume: 4 mL  POC: Post-procedure       (1711)-Not Given               (0902)-Not Given       1825 (3 mL)-Given        (0057)-Not Given       0828 (3 mL)-Given       1845 (3 mL)-Given        (0214)-Not Given       (1037)-Not Given       1711 (3 mL)-Given        (0344)-Not Given       (1118)-Not Given       [ ] 1645           sodium chloride (PF) 0.9% PF flush 3 mL  Dose: 3 mL  Freq: EVERY 1 HOUR PRN Route: IK  PRN Reason: line flush  PRN Comment: for peripheral IV flush post IV meds  Start: 08/24/18 7529    Admin. Amount: 3 mL  Dispense Loc: [facility] Floor Stock  Volume: 4 mL  POC: Post-procedure               sodium chloride 0.9% infusion  Rate: 100 mL/hr   Freq: CONTINUOUS Route: IV  Start: 08/24/18 1645   Admin Instructions: Change to saline lock when PO well tolerated.    Last Admin: 08/27/18 1711  Dispense Loc: [facility] Floor Stock  Volume: 1,000 mL  POC: Post-procedure       1644 ( )-Rate/Dose Verify       1953 ( )-New Bag        0628 ( )-New Bag       0908 ( )-Rate/Dose Verify        0401 ( )-New Bag       0808-Stopped        1711 ( )-New Bag [C]            zolpidem (AMBIEN) tablet 5 mg  Dose: 5 mg  Freq: AT BEDTIME PRN Route: PO  PRN Reason: sleep  Start: 08/25/18 2000   Admin Instructions: POD 1.  Do not give unless at least 6 hours of uninterrupted sleep is expected.    Admin. Amount: 1 tablet (1 × 5 mg tablet)  Dispense Loc: [facility]  POC: Post-procedure              Discontinued Medications  Medications 08/22/18 08/23/18 08/24/18 08/25/18 08/26/18 08/27/18 08/28/18         Dose: 1 g  Freq: EVERY 24 HOURS Route: IV  Indications of Use: URINARY TRACT INFECTION  Start: 08/25/18 2200   End: 08/25/18 1409   Admin. Amount: 1 g  Dispense Loc: [facility]  Infused Over: 15-30 Minutes  Volume: 10 mL        1409-Med Discontinued            Dose: 0.3-0.5 mg  Freq: EVERY 2 HOURS PRN Route: IV  PRN Reason: other  PRN Comment: pain control or improvement in physical function. Hold dose for analgesic side effects.  Start: 08/24/18 1637   End: 08/26/18 1713   Admin Instructions: Give IF unable to tolerate oral option for pain control<br>Start at the lowest dose.  May adjust dose by 0.1 mg every 2 hours as needed.   Notify provider to assess for uncontrolled pain or analgesic side effects.  Hold while on IV PCA or with regular IV opioid dosing.  For ordered doses up to 4 mg give IV Push undiluted. Administer each 2mg over 2-5 minutes.    Admin. Amount: 0.3-0.5 mg  Last Admin: 08/26/18 0411  Dispense Loc:  [facility]  POC:  Post-procedure       1744 (0.3 mg)-Given       2044 (0.3 mg)-Given        0151 (0.5 mg)-Given       2050 (0.5 mg)-Given        0411 (0.5 mg)-Given       1713-Med Discontinued           Dose: 4 mg  Freq: EVERY 6 HOURS Route: IV  Start: 18 1645   End: 18 1759   Admin Instructions: Irritant. For ordered doses up to 4 mg, give IV Push undiluted over 2-5 minutes.    Admin. Amount: 4 mg = 2 mL Conc: 4 mg/2 mL  Last Admin: 18 06  Dispense Loc: [facility]  Infused Over: 2-5 Minutes  Administrations Remainin  Volume: 2 mL  POC: Post-procedure       1744 (4 mg)-Given        0101 (4 mg)-Given       0628 (4 mg)-Given       (1221)-Not Given [C]       1759-Med Discontinued            Dose: 5-10 mg  Freq: EVERY 4 HOURS PRN Route: PO  PRN Reason: other  PRN Comment: pain control or improvement in physical function. Hold dose for analgesic side effects.  Start: 18 163   End: 18 1713   Admin Instructions: Start with the lowest dose. May adjust dose by 5 mg every 4 hours as needed. Notify provider to assess for uncontrolled pain or analgesic side effects. Hold while on PCA or with regular IV opioid dosing. Maximum total is 60 mg in 24 hours.    Admin. Amount: 1-2 tablet (1-2 × 5 mg tablet)  Last Admin: 18 1255  Dispense Loc: [facility]  POC: Post-procedure        0008 (5 mg)-Given       0852 (5 mg)-Given       1301 (5 mg)-Given       1825 (5 mg)-Given        0822 (5 mg)-Given       1255 (5 mg)-Given       1713-Med Discontinued           Dose: 150 mg  Freq: 2 TIMES DAILY Route: PO  Start: 18   End: 18 1024   Admin. Amount: 1 tablet (1 × 150 mg tablet)  Last Admin: 18 0822  Dispense Loc: [facility]  POC: Post-procedure       1953 (150 mg)-Given        0852 (150 mg)-Given       2046 (150 mg)-Given        0822 (150 mg)-Given       1024-Med Discontinued      Medications 18

## 2018-08-22 NOTE — IP AVS SNAPSHOT
"          [facility] ORTHO SPINE: [phone]                                              INTERAGENCY TRANSFER FORM - PHYSICIAN ORDERS   2018                    Hospital Admission Date: 2018  EZEQUIEL BEST   : 1930  Sex: Female        Attending Provider: Dung Buck MD     Allergies:  Hydrocodone, Lisinopril, Metoprolol, Terazosin    Infection:  None   Service:  GENERAL [facility]    Ht:  1.575 m (5' 2.01\")   Wt:  49.3 kg (108 lb 9.6 oz)   Admission Wt:  49.3 kg (108 lb 9.6 oz)    BMI:  19.86 kg/m 2   BSA:  1.47 m 2            Patient PCP Information     Provider PCP Type    HIGINIO Pena [facility] General      ED Clinical Impression     Diagnosis Description Comment Added By Time Added    Closed fracture of neck of right femur, initial encounter (H) [S72.001A] Closed fracture of neck of right femur, initial encounter (H) [S72.001A]  Zita Randall MD 2018  9:03 PM    Closed compression fracture of first lumbar vertebra, initial encounter (H) [S32.010A] Closed compression fracture of first lumbar vertebra, initial encounter (H) [S32.010A]  Zita Randall MD 2018  9:05 PM    Atrial fibrillation, unspecified type (H) [I48.91] Atrial fibrillation, unspecified type (H) [I48.91]  Zita Randall MD 2018 10:07 PM    UTI (urinary tract infection) with pyuria [N39.0] UTI (urinary tract infection) with pyuria [N39.0]  Zita Randall MD 2018 10:10 PM      Hospital Problems as of 2018              Priority Class Noted POA    Hip fracture (H) Medium  2018 Yes    S/P total hip arthroplasty Medium  2018 Yes      Non-Hospital Problems as of 2018              Priority Class Noted    Cerebrovascular accident (H) Medium  2016    Hemorrhagic stroke (H) Medium  2016    Syncope Medium  2016    Adjustment disorder with mixed anxiety and depressed mood Medium  2016    Hypertensive heart disease without heart failure " Medium  12/2/2016    Advance care planning Medium  1/4/2017    Closed nondisplaced fracture of pelvis with routine healing, unspecified part of pelvis, subsequent encounter Medium  1/23/2017    Vascular dementia without behavioral disturbance Medium  3/13/2017    Acquired hypothyroidism Medium  3/13/2017    Rash and nonspecific skin eruption Medium  3/3/2018    Hemiparesis following cerebrovascular accident (CVA) (H) Medium  7/5/2018      Code Status History     Date Active Date Inactive Code Status Order ID Comments User Context    8/28/2018 10:55 AM  DNR/DNI [id number]  Wilver Zamora MD Outpatient    8/25/2018  8:28 AM 8/28/2018 10:55 AM DNR/DNI [id number]  Jean Garcia MD Inpatient    8/23/2018 12:40 AM 8/24/2018 10:39 PM DNR/DNI [id number]  Johnny Wyman MD Inpatient    1/13/2017  3:47 PM 8/23/2018 12:40 AM DNR/DNI [id number]  Anna Garcia, APRN CNP Outpatient         Medication Review      START taking        Dose / Directions Comments    HYDROCERIN Lotn lotion   Used for:  Itching        Apply topically 3 times daily as needed for other (itchy skin)   Quantity:  236 mL   Refills:  0        HYDROmorphone 2 MG tablet   Commonly known as:  DILAUDID   Used for:  Closed fracture of neck of right femur, initial encounter (H)        Dose:  2 mg   Take 1 tablet (2 mg) by mouth every 4 hours as needed for moderate to severe pain   Quantity:  15 tablet   Refills:  0          CONTINUE these medications which may have CHANGED, or have new prescriptions. If we are uncertain of the size of tablets/capsules you have at home, strength may be listed as something that might have changed.        Dose / Directions Comments    acetaminophen 325 MG tablet   Commonly known as:  TYLENOL   This may have changed:    - medication strength  - how much to take  - when to take this  - Another medication with the same name was removed. Continue taking this medication, and follow the directions you see here.    Used for:  Closed fracture of neck of right femur, initial encounter (H)        Dose:  650 mg   Take 2 tablets (650 mg) by mouth every 6 hours   Quantity:  100 tablet   Refills:  0        aspirin 325 MG EC tablet   This may have changed:    - medication strength  - how much to take  - how to take this  - when to take this  - additional instructions   Used for:  Closed fracture of neck of right femur, initial encounter (H)        Take one Aspirin tab twice daily for 5 weeks.   Quantity:  70 tablet   Refills:  0        hydrOXYzine 25 MG tablet   Commonly known as:  ATARAX   This may have changed:    - medication strength  - how much to take  - when to take this  - reasons to take this   Used for:  Closed fracture of neck of right femur, initial encounter (H)        Dose:  12.5 mg   Take 0.5 tablets (12.5 mg) by mouth every 6 hours as needed   Quantity:  30 tablet   Refills:  0        metoprolol tartrate 25 MG tablet   Commonly known as:  LOPRESSOR   This may have changed:  when to take this   Used for:  Benign essential hypertension, Paroxysmal atrial fibrillation (H)        Dose:  12.5 mg   Take 0.5 tablets (12.5 mg) by mouth 2 times daily   Quantity:  60 tablet   Refills:  0          CONTINUE these medications which have NOT CHANGED        Dose / Directions Comments    camphor-menthol 0.5-0.5 % Lotn   Commonly known as:  DERMASARRA   Used for:  Dry skin        Apply lotion to body daily   Quantity:  1 Bottle   Refills:  11        citalopram 10 MG tablet   Commonly known as:  celeXA   Used for:  Adjustment disorder with depressed mood        Dose:  10 mg   Take 1 tablet (10 mg) by mouth daily   Quantity:  31 tablet   Refills:  98        levothyroxine 50 MCG tablet   Commonly known as:  SYNTHROID/LEVOTHROID   Used for:  Other specified hypothyroidism        TAKE 1 TABLET BY MOUTH ONCE DAILY   Quantity:  31 tablet   Refills:  97        Menthol-Zinc Oxide 0.44-20.6 % Oint   Commonly known as:  CALMOSEPTINE   Used for:   Rash        Dose:  71 g   Externally apply 71 g topically 2 times daily APPLY TOPICALLY TO COCCYX TWICE DAILY AND WITH ALL INCONTINENT CHANGES FOR SKIN ITCH AND BREAKDOWN   Quantity:  113 g   Refills:  11                Summary of Visit     Reason for your hospital stay       Following right hip bipolar hemiarthroplasty after displaced femoral neck fracture.  Has had minimal oral intake while here.  If over the next week she is unable to maintain hydration or nutrition would recommend hospice consideration at that time.             After Care     Activity - Up with assistive device           Advance Diet as Tolerated       Follow this diet upon discharge: regular       Mantoux instructions       Give two-step Mantoux (PPD) Per Facility Policy Yes       Weight bearing status       WBAT       Wound care       Site:   R hip  Instructions:  Leave aquacel dressing in place until post-op follow-up.  If it becomes loose or soiled then remove and replace with daily dry dressings.             Referrals     Home Care PT Referral for Hospital Discharge       PT to eval and treat. WBAT. No hip precautions.    Your provider has ordered home care - physical therapy. If you have not been contacted within 2 days of your discharge please call the department phone number listed on the top of this document.       Home care nursing referral       Eval and treat    Your provider has ordered home care nursing services. If you have not been contacted within 2 days of your discharge please call the inpatient department phone number at [phone] .              MD face to face encounter       Documentation of Face to Face and Certification for Home Health Services    I certify that patient: April Donnelly is under my care and that I, or a nurse practitioner or physician's assistant working with me, had a face-to-face encounter that meets the physician face-to-face encounter requirements with this patient on: 8/28/2018.    This encounter  with the patient was in whole, or in part, for the following medical condition, which is the primary reason for home health care: work on mobility and home assessment.    I certify that, based on my findings, the following services are medically necessary home health services: Nursing, Occupational Therapy and Physical Therapy.    My clinical findings support the need for the above services because: Occupational Therapy Services are needed to assess and treat cognitive ability and address ADL safety due to impairment in mobility following hip surgery.    Further, I certify that my clinical findings support that this patient is homebound (i.e. absences from home require considerable and taxing effort and are for medical reasons or [employer] services or infrequently or of short duration when for other reasons) because: Requires assistance of another person or specialized equipment to access medical services because patient: Has prohibitive pain during ambulation...    Based on the above findings. I certify that this patient is confined to the home and needs intermittent skilled nursing care, physical therapy and/or speech therapy.  The patient is under my care, and I have initiated the establishment of the plan of care.  This patient will be followed by a physician who will periodically review the plan of care.  Physician/Provider to provide follow up care: Kvng Cowart    Attending hospital physician (the Medicare certified PECOS provider): Dung Buck  Physician Signature: See electronic signature associated with these discharge orders.  Date: 8/28/2018                  Follow-Up Appointment Instructions     Future Labs/Procedures    Follow Up and recommended labs and tests     Comments:    Follow up with Nidia Gaines PA-C within 12-16 days from surgery.  If you do not already have a follow up appointment scheduled, please call our [place] office: [phone].  No follow up labs or test are  needed.    Consideration for hospice if unable to maintain hydration or nutrition over the next week.      Follow-Up Appointment Instructions     Follow Up and recommended labs and tests       Follow up with Nidia Gaines PA-C within 12-16 days from surgery.  If you do not already have a follow up appointment scheduled, please call our [place] office: [phone].  No follow up labs or test are needed.    Consideration for hospice if unable to maintain hydration or nutrition over the next week.             Statement of Approval     Ordered          08/28/18 1055  I have reviewed and agree with all the recommendations and orders detailed in this document.  EFFECTIVE NOW     Approved and electronically signed by:  Wilver Zamora MD           08/27/18 1347  I have reviewed and agree with all the recommendations and orders detailed in this document.  EFFECTIVE NOW     Approved and electronically signed by:  Nidia Gaines PA-C           08/27/18 9368  I have reviewed and agree with all the recommendations and orders detailed in this document.  EFFECTIVE NOW     Approved and electronically signed by:  Nidia Gaines PA-C

## 2018-08-22 NOTE — IP AVS SNAPSHOT
` ` Patient Information     Patient Name Sex     April Donnelly ([id number]) Female 1930       Room Bed    0600 0600-01      Patient Demographics     Address Phone E-mail Address    [facility]  [address] [address] [phone] (Home)  NONE (Work)  [phone] (Mobile) *Preferred* [email or web address]      Patient Ethnicity & Race     Ethnic Group Patient Race    American White      Emergency Contact(s)     Name Relation Home Work Mobile    Che Donnelly Son [phone] NONE [phone]    Woody Donnelly Son [phone] none [phone]    Reji Donnelly Son [phone]    Vani Donnelly Relative [phone]      Documents on File        Status Date Received Description       Documents for the Patient    Consent for EHR Access Received 09/15/16     Insurance Card Received 18     External Medication Information Consent Accepted 11/10/16     Patient ID Received 10/25/16     [facility] Specified Other       Consent for Services/Privacy Notice - Hospital/Clinic Received () 09/15/16     Privacy Notice - [place] Received 09/15/16     HIM RAJIV Authorization  10/06/16 [facility]    Consent for Services - Geriatrics Received 11/10/16     Business/Insurance/Care Coordination/Health Form - Patient   [facility] Patient Enrollment Form    HIM RAJIV Authorization - File Only   [employer]    Consent to Communicate Received 11/10/16     Consent for Services - Geriatrics Received 11/10/16     Consent for EHR Access Received 11/10/16     Insurance Card Received 18 BCBS & Medicare (2016)    Advance Directives and Living Will Received 11/10/16 Health Care Directive 2005    Advance Directives and Living Will Not Received  Validation of AD 2005    Advance Directives and Living Will Not Received  Validation of AD 2005    Advance Directives and Living Will Received 11/10/16 Health Care  Directive 05/03/2005    Business/Insurance/Care Coordination/Health Form - Patient   [facility] Patient Health History Form    Care Everywhere Prospective Auth       Advance Directives and Living Will Received 05/02/18 POLST 12-19-17    Consent for Services - Hospital and Clinic Received 08/24/18     HIE Auth Received 08/24/18     Advance Directives and Living Will Received (Deleted) 05/02/18        Documents for the Encounter    CMS IM for Patient Signature Received 08/24/18 pt unable left copy in room and in chart     CMS IM for Patient Signature Received 08/24/18     CMS IM for Patient Signature Received 08/28/18 2nd IMM reviewed w/ son      Admission Information     Attending Provider Admitting Provider Admission Type Admission Date/Time    Dung Buck MD Nemanich, Joseph Patrick, MD Emergency 08/22/18 2014    Discharge Date Hospital Service Auth/Cert Status Service Area     General Medicine [facility] SERVICES    Unit Room/Bed Admission Status        ORTHO SPINE 0600/0600-01 Admission (Confirmed)       Admission     Complaint    Hip fracture (H), fracture, S/P total hip arthroplasty      Hospital Account     Name Acct ID Class Status Primary Coverage    DonnellyApril [id number] Inpatient Open MEDICARE - MEDICARE            Guarantor Account (for Hospital Account #[id number])     Name Relation to Pt Service Area Active? Acct Type    DonnellyAlbinas Self FCS Yes Personal/Family    Address Phone          [facility]  [address] [place] [address] [phone](H)  NONE(O)              Coverage Information (for Hospital Account #[id number])     1. MEDICARE/MEDICARE     F/O Payor/Plan Precert #    MEDICARE/MEDICARE     Subscriber Subscriber #    DonnellyApril [id number]    Address Phone    [employer] CLAIM[employer]  [address]  [place] [address] [phone]          2. BCBS/[employer]BS [employer]     F/O Payor/Plan Precert #     BCBS/BCBS [employer]     Subscriber Subscriber #    April Donnelly [id number]    Address Phone    [address] [address]  [place] [address] [phone]

## 2018-08-23 ENCOUNTER — APPOINTMENT (OUTPATIENT)
Dept: CARDIOLOGY | Facility: CLINIC | Age: 83
DRG: 470 | End: 2018-08-23
Attending: INTERNAL MEDICINE
Payer: MEDICARE

## 2018-08-23 PROBLEM — S72.009A HIP FRACTURE (H): Status: ACTIVE | Noted: 2018-08-23

## 2018-08-23 LAB
INTERPRETATION ECG - MUSE: NORMAL
MAGNESIUM SERPL-MCNC: 1.7 MG/DL (ref 1.6–2.3)
POTASSIUM SERPL-SCNC: 4.1 MMOL/L (ref 3.4–5.3)
TSH SERPL DL<=0.005 MIU/L-ACNC: 1.48 MU/L (ref 0.4–4)

## 2018-08-23 PROCEDURE — 25000132 ZZH RX MED GY IP 250 OP 250 PS 637: Mod: GY | Performed by: INTERNAL MEDICINE

## 2018-08-23 PROCEDURE — 25000128 H RX IP 250 OP 636: Performed by: INTERNAL MEDICINE

## 2018-08-23 PROCEDURE — 99233 SBSQ HOSP IP/OBS HIGH 50: CPT | Performed by: INTERNAL MEDICINE

## 2018-08-23 PROCEDURE — 93306 TTE W/DOPPLER COMPLETE: CPT

## 2018-08-23 PROCEDURE — 12000007 ZZH R&B INTERMEDIATE

## 2018-08-23 PROCEDURE — A9270 NON-COVERED ITEM OR SERVICE: HCPCS | Mod: GY | Performed by: INTERNAL MEDICINE

## 2018-08-23 PROCEDURE — 84132 ASSAY OF SERUM POTASSIUM: CPT | Performed by: INTERNAL MEDICINE

## 2018-08-23 PROCEDURE — 83735 ASSAY OF MAGNESIUM: CPT | Performed by: INTERNAL MEDICINE

## 2018-08-23 PROCEDURE — 84443 ASSAY THYROID STIM HORMONE: CPT | Performed by: INTERNAL MEDICINE

## 2018-08-23 PROCEDURE — 36415 COLL VENOUS BLD VENIPUNCTURE: CPT | Performed by: INTERNAL MEDICINE

## 2018-08-23 PROCEDURE — 93306 TTE W/DOPPLER COMPLETE: CPT | Mod: 26 | Performed by: INTERNAL MEDICINE

## 2018-08-23 RX ORDER — POTASSIUM CL/LIDO/0.9 % NACL 10MEQ/0.1L
10 INTRAVENOUS SOLUTION, PIGGYBACK (ML) INTRAVENOUS
Status: DISCONTINUED | OUTPATIENT
Start: 2018-08-23 | End: 2018-08-24

## 2018-08-23 RX ORDER — ONDANSETRON 4 MG/1
4 TABLET, ORALLY DISINTEGRATING ORAL EVERY 6 HOURS PRN
Status: DISCONTINUED | OUTPATIENT
Start: 2018-08-23 | End: 2018-08-24

## 2018-08-23 RX ORDER — BISACODYL 5 MG
5 TABLET, DELAYED RELEASE (ENTERIC COATED) ORAL DAILY PRN
Status: DISCONTINUED | OUTPATIENT
Start: 2018-08-23 | End: 2018-08-24

## 2018-08-23 RX ORDER — NALOXONE HYDROCHLORIDE 0.4 MG/ML
.1-.4 INJECTION, SOLUTION INTRAMUSCULAR; INTRAVENOUS; SUBCUTANEOUS
Status: DISCONTINUED | OUTPATIENT
Start: 2018-08-23 | End: 2018-08-24

## 2018-08-23 RX ORDER — HYDROXYZINE HYDROCHLORIDE 50 MG/1
50 TABLET, FILM COATED ORAL EVERY 6 HOURS PRN
Status: DISCONTINUED | OUTPATIENT
Start: 2018-08-23 | End: 2018-08-24

## 2018-08-23 RX ORDER — ONDANSETRON 2 MG/ML
4 INJECTION INTRAMUSCULAR; INTRAVENOUS EVERY 6 HOURS PRN
Status: DISCONTINUED | OUTPATIENT
Start: 2018-08-23 | End: 2018-08-24

## 2018-08-23 RX ORDER — ACETAMINOPHEN 500 MG
500 TABLET ORAL EVERY 6 HOURS PRN
Status: DISCONTINUED | OUTPATIENT
Start: 2018-08-23 | End: 2018-08-24

## 2018-08-23 RX ORDER — POTASSIUM CHLORIDE 29.8 MG/ML
20 INJECTION INTRAVENOUS
Status: DISCONTINUED | OUTPATIENT
Start: 2018-08-23 | End: 2018-08-23 | Stop reason: CLARIF

## 2018-08-23 RX ORDER — METOPROLOL TARTRATE 1 MG/ML
2.5 INJECTION, SOLUTION INTRAVENOUS EVERY 4 HOURS PRN
Status: DISCONTINUED | OUTPATIENT
Start: 2018-08-23 | End: 2018-08-24

## 2018-08-23 RX ORDER — MAGNESIUM SULFATE HEPTAHYDRATE 40 MG/ML
4 INJECTION, SOLUTION INTRAVENOUS EVERY 4 HOURS PRN
Status: DISCONTINUED | OUTPATIENT
Start: 2018-08-23 | End: 2018-08-24

## 2018-08-23 RX ORDER — ACETAMINOPHEN 500 MG
1000 TABLET ORAL ONCE
Status: CANCELLED | OUTPATIENT
Start: 2018-08-23 | End: 2018-08-23

## 2018-08-23 RX ORDER — CEFTRIAXONE 1 G/1
1 INJECTION, POWDER, FOR SOLUTION INTRAMUSCULAR; INTRAVENOUS EVERY 24 HOURS
Status: DISCONTINUED | OUTPATIENT
Start: 2018-08-23 | End: 2018-08-24

## 2018-08-23 RX ORDER — TRAMADOL HYDROCHLORIDE 50 MG/1
50 TABLET ORAL EVERY 6 HOURS PRN
Status: DISCONTINUED | OUTPATIENT
Start: 2018-08-23 | End: 2018-08-24

## 2018-08-23 RX ORDER — POTASSIUM CHLORIDE 7.45 MG/ML
10 INJECTION INTRAVENOUS
Status: DISCONTINUED | OUTPATIENT
Start: 2018-08-23 | End: 2018-08-24

## 2018-08-23 RX ORDER — CITALOPRAM HYDROBROMIDE 10 MG/1
10 TABLET ORAL DAILY
Status: DISCONTINUED | OUTPATIENT
Start: 2018-08-23 | End: 2018-08-24

## 2018-08-23 RX ORDER — AMOXICILLIN 250 MG
1 CAPSULE ORAL 2 TIMES DAILY
Status: DISCONTINUED | OUTPATIENT
Start: 2018-08-23 | End: 2018-08-24

## 2018-08-23 RX ORDER — HYDROMORPHONE HYDROCHLORIDE 1 MG/ML
0.2 INJECTION, SOLUTION INTRAMUSCULAR; INTRAVENOUS; SUBCUTANEOUS
Status: DISCONTINUED | OUTPATIENT
Start: 2018-08-23 | End: 2018-08-24

## 2018-08-23 RX ORDER — POTASSIUM CHLORIDE 1.5 G/1.58G
20-40 POWDER, FOR SOLUTION ORAL
Status: DISCONTINUED | OUTPATIENT
Start: 2018-08-23 | End: 2018-08-24

## 2018-08-23 RX ORDER — HYDROXYZINE HYDROCHLORIDE 25 MG/1
25 TABLET, FILM COATED ORAL EVERY 6 HOURS PRN
Status: DISCONTINUED | OUTPATIENT
Start: 2018-08-23 | End: 2018-08-24

## 2018-08-23 RX ORDER — BISACODYL 5 MG
15 TABLET, DELAYED RELEASE (ENTERIC COATED) ORAL DAILY PRN
Status: DISCONTINUED | OUTPATIENT
Start: 2018-08-23 | End: 2018-08-24

## 2018-08-23 RX ORDER — SODIUM CHLORIDE 9 MG/ML
INJECTION, SOLUTION INTRAVENOUS CONTINUOUS
Status: DISCONTINUED | OUTPATIENT
Start: 2018-08-23 | End: 2018-08-24

## 2018-08-23 RX ORDER — HYDROXYZINE HYDROCHLORIDE 10 MG/1
10 TABLET, FILM COATED ORAL DAILY
Status: DISCONTINUED | OUTPATIENT
Start: 2018-08-23 | End: 2018-08-24

## 2018-08-23 RX ORDER — BISACODYL 10 MG
10 SUPPOSITORY, RECTAL RECTAL DAILY PRN
Status: DISCONTINUED | OUTPATIENT
Start: 2018-08-23 | End: 2018-08-24

## 2018-08-23 RX ORDER — BISACODYL 5 MG
10 TABLET, DELAYED RELEASE (ENTERIC COATED) ORAL DAILY PRN
Status: DISCONTINUED | OUTPATIENT
Start: 2018-08-23 | End: 2018-08-24

## 2018-08-23 RX ORDER — LEVOTHYROXINE SODIUM 50 UG/1
50 TABLET ORAL DAILY
Status: DISCONTINUED | OUTPATIENT
Start: 2018-08-23 | End: 2018-08-24

## 2018-08-23 RX ORDER — POTASSIUM CHLORIDE 1500 MG/1
20-40 TABLET, EXTENDED RELEASE ORAL
Status: DISCONTINUED | OUTPATIENT
Start: 2018-08-23 | End: 2018-08-24

## 2018-08-23 RX ORDER — AMOXICILLIN 250 MG
2 CAPSULE ORAL 2 TIMES DAILY
Status: DISCONTINUED | OUTPATIENT
Start: 2018-08-23 | End: 2018-08-24

## 2018-08-23 RX ORDER — ACETAMINOPHEN 500 MG
1000 TABLET ORAL 2 TIMES DAILY
Status: DISCONTINUED | OUTPATIENT
Start: 2018-08-23 | End: 2018-08-24

## 2018-08-23 RX ADMIN — ACETAMINOPHEN 500 MG: 500 TABLET, FILM COATED ORAL at 17:21

## 2018-08-23 RX ADMIN — CITALOPRAM HYDROBROMIDE 10 MG: 10 TABLET ORAL at 10:35

## 2018-08-23 RX ADMIN — ACETAMINOPHEN 1000 MG: 500 TABLET, FILM COATED ORAL at 19:40

## 2018-08-23 RX ADMIN — CEFTRIAXONE SODIUM 1 G: 1 INJECTION, POWDER, FOR SOLUTION INTRAMUSCULAR; INTRAVENOUS at 21:39

## 2018-08-23 RX ADMIN — SENNOSIDES AND DOCUSATE SODIUM 2 TABLET: 8.6; 5 TABLET ORAL at 19:40

## 2018-08-23 RX ADMIN — SODIUM CHLORIDE: 9 INJECTION, SOLUTION INTRAVENOUS at 10:35

## 2018-08-23 RX ADMIN — SODIUM CHLORIDE: 9 INJECTION, SOLUTION INTRAVENOUS at 21:23

## 2018-08-23 RX ADMIN — LEVOTHYROXINE SODIUM 50 MCG: 50 TABLET ORAL at 10:35

## 2018-08-23 RX ADMIN — ACETAMINOPHEN 1000 MG: 500 TABLET, FILM COATED ORAL at 09:59

## 2018-08-23 RX ADMIN — Medication 0.2 MG: at 19:37

## 2018-08-23 RX ADMIN — Medication 12.5 MG: at 19:40

## 2018-08-23 RX ADMIN — HYDROXYZINE HYDROCHLORIDE 50 MG: 50 TABLET, FILM COATED ORAL at 17:21

## 2018-08-23 RX ADMIN — Medication 0.2 MG: at 22:00

## 2018-08-23 RX ADMIN — Medication 12.5 MG: at 09:58

## 2018-08-23 RX ADMIN — SODIUM CHLORIDE: 9 INJECTION, SOLUTION INTRAVENOUS at 01:02

## 2018-08-23 RX ADMIN — HYDROXYZINE HYDROCHLORIDE 10 MG: 10 TABLET ORAL at 09:59

## 2018-08-23 ASSESSMENT — ACTIVITIES OF DAILY LIVING (ADL)
ADLS_ACUITY_SCORE: 25
ADLS_ACUITY_SCORE: 27
ADLS_ACUITY_SCORE: 25
ADLS_ACUITY_SCORE: 27
ADLS_ACUITY_SCORE: 27

## 2018-08-23 NOTE — PROGRESS NOTES
ORTHO NOTE    88 year old female with right femoral neck fx.  History of CVA, dementia, hemiparesis, HTN    This fracture will need a hmeiarthroplasty if she decides to do that.    OR time is in short supply.  Possibly done tomorrow

## 2018-08-23 NOTE — ED NOTES
[facility]  ED Nurse Handoff Report    April Donnelly is a 88 year old female   ED Chief complaint: Fall  . ED Diagnosis:   Final diagnoses:   Closed fracture of neck of right femur, initial encounter (H)   Closed compression fracture of first lumbar vertebra, initial encounter (H)   Atrial fibrillation, unspecified type (H)   UTI (urinary tract infection) with pyuria     Allergies:   Allergies   Allergen Reactions     Hydrocodone      Lisinopril      Metoprolol      Terazosin        Code Status: DNR / DNI  Activity level - Baseline/Home:  Stand with Assist. Activity Level - Current:   Stand with Assist of 2. Lift room needed: No. Bariatric: No   Needed: No   Isolation: No. Infection: Not Applicable.     Vital Signs:   Vitals:    08/22/18 2030 08/22/18 2145 08/22/18 2200 08/22/18 2215   BP: 152/70  160/62    Pulse:       Resp:       Temp:       TempSrc:       SpO2:  99% 98% 97%       Cardiac Rhythm:  ,   Cardiac  Cardiac Rhythm: Atrial fibrillation;Other (Comment)  Pain level:    Patient confused: Yes. Patient Falls Risk: Yes.   Elimination Status: Urethral catheter (bryan) in place; orders for patient to discharge with bryan    Patient Report - Initial Complaint: Fall, right hip pain. Focused Assessment: Respiratory- Lung sounds diminished. Cardiac- Apical pulse irregular. EKG showing A-fib. Has pacemaker. Neuro- Alert to self. Hx of dementia. Pleasantly confused. - Arrived to ED in brief, currently has peds sized catheter in place. Found to have UTI. Musculoskeletal- Right hip pain after falling from standing in dining are at  facility on to hardwood floor. Right leg is shorter than left, DP pulses present and palpable +1.   Tests Performed: Labs, EKG, Xray. Abnormal Results:   Labs Ordered and Resulted from Time of ED Arrival Up to the Time of Departure from the ED   BASIC METABOLIC PANEL - Abnormal; Notable for the following:        Result Value    Calcium 8.4 (*)     All other  components within normal limits   ROUTINE UA WITH MICROSCOPIC - Abnormal; Notable for the following:     Blood Urine Small (*)     Nitrite Urine Positive (*)     Leukocyte Esterase Urine Large (*)     WBC Urine >182 (*)     RBC Urine 7 (*)     Bacteria Urine Many (*)     Mucous Urine Present (*)     All other components within normal limits   CBC WITH PLATELETS DIFFERENTIAL   TROPONIN I   POTASSIUM   PERIPHERAL IV CATHETER   CONTINUE INDWELLING URINARY CATHETER (RAHMAN)   URINE CULTURE AEROBIC BACTERIAL     Lumbar spine XR, 2-3 views   Final Result   IMPRESSION: There is age indeterminate moderate anterior wedge   compression deformity of the L1 vertebral body. Vertebral body heights   of the lumbar spine are otherwise normal. Alignment of the lumbar   vertebrae is normal. There is no definite evidence for recent fracture   of the lumbar spine.      SAMIRA GOMEZ MD      XR Pelvis and Hip Right 2 Views   Final Result   IMPRESSION: There is a moderately displaced transverse fracture   through the upper neck of the proximal right femur with cephalad   displacement of the distal fragment. No other fractures noted. Hip   joint alignment bilaterally appears normal.      SAMIRA GOMEZ MD         Treatments provided: 1 gram rocephin  Family Comments: Son visited, now has gone home  OBS brochure/video discussed/provided to patient:  N/A  ED Medications:   Medications   cefTRIAXone (ROCEPHIN) 1 g vial to attach to  mL bag for ADULTS or NS 50 mL bag for PEDS (not administered)     Drips infusing:  Yes  For the majority of the shift, the patient's behavior Green. Interventions performed were N/A.     Severe Sepsis OR Septic Shock Diagnosis Present: No      ED Nurse Name/Phone Number: Paulina Bell,   10:51 PM    RECEIVING UNIT ED HANDOFF REVIEW    Above ED Nurse Handoff Report was reviewed: Yes  Reviewed by: Soumya Wilkerson on August 22, 2018 at 11:54 PM

## 2018-08-23 NOTE — ED TRIAGE NOTES
Pt presents via EMS for evaluation from a fall sustained in the dining area at [facility]. Pt fell on to a hardwood floor, no apparent injury to head or neck. Pt was initially c/o pain in right hip, but when EMS arrived and moved pt, no complaints noted. Right leg is shorter than left.

## 2018-08-23 NOTE — PHARMACY-ADMISSION MEDICATION HISTORY
Admission medication history interview status for this patient is complete. See [facility] admission navigator for allergy information, prior to admission medications and immunization status.     Medication history interview source(s):other   Medication history resources (including written lists, pill bottles, clinic record):Catherine TY  Primary pharmacy:    Changes made to PTA medication list:  Added: none  Deleted: none  Changed: none    Actions taken by pharmacist (provider contacted, etc):None     Additional medication history information:None    Medication reconciliation/reorder completed by provider prior to medication history? No    Do you take OTC medications (eg tylenol, ibuprofen, fish oil, eye/ear drops, etc)? No(Y/N)    For patients on insulin therapy: No (Y/N)  Lantus/levemir/NPH/Mix 70/30 dose:   (Y/N) (see Med list for doses)   Sliding scale Novolog Y/N  If Yes, do you have a baseline novolog pre-meal dose:  units with meals  Patients eat three meals a day:   Y/N    How many episodes of hypoglycemia do you have per week: _______  How many missed doses do you have per week: ______  How many times do you check your blood glucose per day: _______   Any Barriers to therapy - Be specific :  cost of medications, comfortable with giving injections (if applicable), comfortable and confident with current diabetes regimen: Y/N ______________      Prior to Admission medications    Medication Sig Last Dose Taking? Auth Provider   Acetaminophen (TYLENOL PO) Take 1,000 mg by mouth 2 times daily  8/22/2018 at am Yes Reported, Patient   aspirin 81 MG EC tablet Take 1 tablet (81 mg) by mouth daily 8/22/2018 at Unknown time Yes Kvng Cowart APRN CNP   camphor-menthol (DERMASARRA) 0.5-0.5 % LOTN Apply lotion to body daily 8/22/2018 at Unknown time Yes Kvng Cowart APRN CNP   citalopram (CELEXA) 10 MG tablet Take 1 tablet (10 mg) by mouth daily 8/22/2018 at Unknown time Yes Kvng Cowart APRN CNP    HYDROXYZINE HCL PO Take 10 mg by mouth daily 8/22/2018 at Unknown time Yes Reported, Patient   levothyroxine (SYNTHROID/LEVOTHROID) 50 MCG tablet TAKE 1 TABLET BY MOUTH ONCE DAILY 8/22/2018 at Unknown time Yes Kvng Cowart APRN CNP   Menthol-Zinc Oxide (CALMOSEPTINE) 0.44-20.6 % OINT Externally apply 71 g topically 2 times daily APPLY TOPICALLY TO COCCYX TWICE DAILY AND WITH ALL INCONTINENT CHANGES FOR SKIN ITCH AND BREAKDOWN 8/22/2018 at Unknown time Yes Kvng Cwoart APRN CNP   metoprolol tartrate (LOPRESSOR) 25 MG tablet Take 0.5 tablets (12.5 mg) by mouth daily 8/22/2018 at Unknown time Yes Kvng Cowart APRN CNP   ACETAMINOPHEN PO Take 500 mg by mouth 2 times daily as needed for pain   Reported, Patient

## 2018-08-23 NOTE — ED PROVIDER NOTES
History     Chief Complaint:  Fall    The history is limited by a developmental delay.      April Donnelly is a 88 year old female with a history of CVA who presents to the emergency department via EMS for evaluation of a fall. Earlier tonight the patient reportedly fell in the dining area of [facility] [place] onto a hardwood floor. There was no apparent injury to the head or neck per EMS and they noted that the patient was not complaining of pain, but her history of dementia made history unreliable. Here, the patient does not complain outright of any pain, but does wince upon examination of her right hip.  Patient denies chest pain or abdominal pain.  Denies dizziness.  Patient does not remember the fall.    Allergies:  Hydrocodone  Lisinopril  Metoprolol  Terazosin    Medications:    Acetaminophen  Aspirin  Camphor-Menthol  Citalopram  Hydroxyzine  Levothyroxine  Lidocaine  Menthol-Zinc Oxide  Metoprolol Tartrate    Past Medical History:    Adjustment Disorder  Anxiety  Depression  CVA  Hemorrhagic Stroke  Hypertension  Dementia  Syncope   Thyroid Disease  Dementia    Past Surgical History:    Thyroidectomy    Family History:    No past pertinent family history.    Social History:  Marital Status:   [5]  Tobacco Use: No  Alcohol Use: No    Review of Systems   Unable to perform ROS: Dementia   All other systems reviewed and are negative.  Pt presents via EMS for evaluation from a fall sustained in the dining area at [facility]. Pt fell on to a hardwood floor, no apparent injury to head or neck. Pt was initially c/o pain in right hip, but when EMS arrived and moved pt, no complaints noted. Right leg is shorter than left.    Physical Exam     Patient Vitals for the past 24 hrs:   BP Temp Temp src Pulse Resp SpO2   08/22/18 2215 - - - - - 97 %   08/22/18 2200 160/62 - - - - 98 %   08/22/18 2145 - - - - - 99 %   08/22/18 2030 152/70 - - - - -   08/22/18 2021 136/88 97.1  F (36.2  C) Temporal 76 20  99 %       Physical Exam   Musculoskeletal:        Legs:    GEN: patient smiling, no distress, [place]  HEAD: atraumatic, normocephalic, no cephalohemaoma  EYES: pupils reactive (3plus to 2plus), extraocular muscles intact, conjunctivae normal  ENT: TMs flat and white bilaterally, oropharynx normal with no erythema or exudate, mucus membranes dry  NECK: no posterior midline tenderness, no cervical LAD.  RESPIRATORY: no tachypnea, breath sounds clear to auscultation (no rales, wheezes, rhonchi), chest wall nontender, normal phonation  CVS: normal S1/S2, I/VI systolic ej murmurs, no rubs/gallops  ABDOMEN: soft, nontender, no masses or organomegaly  BACK: no thoracic spinal tenderness, ?tenderness mid lumbar area  EXTREMITIES: intact pulses x 4, full range of motion at joints (except right hip), trace edema of legs, cap refill < 3 seconds  MUSCULOSKELETAL: right hip externally rotated.  Pain in proximal right hip with int/ext rotation.  Thigh and knee and tib/fib and ankle nontender to palpation  SKIN: warm and dry, femoral pulses intact, popliteal pulses intact.  NEURO: GCS 14, cranial nerves intact.  Motor- moves all 4 extremities , wiggles toes, DF and PF 4/5. normal.  Sensation- intact all UE and LE dermatones to pinprick and light touch.    Coordination- cannot stand on right leg.  Overall symmetrical exam  HEME: no bruising or petechiae/contusions  LYMPH: no lymphadenopathy        Emergency Department Course   ECG:  Indication: Fall  Time: 2202  Vent. Rate 103 bpm. NM interval *. QRS duration 80. QT/QTc 370/484. P-R-T axis * -24 -14. Atrial fibrillation with rapid ventricular response with premature ventricular or aberrantly conducted complexes. Septal infarct, age undetermined. Abnormal ECG. Read time: 2209    Imaging:  XR Right Hip and Pelvis 2 views:   There is a moderately displaced transverse fracture through the upper neck of the proximal right femur with cephalad displacement of the distal fragment. No other  fractures noted. Hip joint alignment bilaterally appears normal. As per radiology.     XR Lumbar Spine 2-3 views:   There is age indeterminate moderate anterior wedge compression deformity of the L1 vertebral body. Vertebral body heights of the lumbar spine are otherwise normal. Alignment of the lumbar vertebrae is normal. There is no definite evidence for recent fracture of the lumbar spine. As per radiology.     Laboratory:  CBC: WBC: 6.6, HGB: 13.8, PLT: 259  BMP: Calcium: 8.4 (L), o/w WNL (Creatinine: 0.59)  UA with micro: Blood: Small, Nitrite: Positive, Leukocyte Esterase: Large, WBC/HPF: >182 (H), RBC/HPF: 7 (H), Bacteria: Many, Mucous: Present o/w negative  Troponin I: <0.015  Potassium: 3.9  Urine Culture: Pending    Interventions:  2250 Rocephin 1 g, IV  [place]  Cardiac/Sp02 monitoring  Oxygen by nasal cannula at 2L/min  500cc NS bolus    Emergency Department Course:  2020 Nursing notes and vitals reviewed. I performed an exam of the patient as documented above.     IV inserted. Medicine administered as documented above. Blood drawn. This was sent to the lab for further testing, results above.    The patient was sent for a lumbar spine xray and a right hip and pelvis xray while in the emergency department, findings above.     The patient provided a urine sample here in the emergency department. This was sent for laboratory testing, findings above.      EKG obtained in the ED, see results above.     2111 I discussed the case with Dr. Perez of orthopedics.     2145 I consulted with Dr. Wyman of the hospitalist services. They are in agreement to accept the patient for admission.    Findings and plan explained to the patient who consents to admission. Discussed the patient with Dr. Wyman, who will admit the patient to an adult med/surg bed for further monitoring, evaluation, and treatment.  /70 (BP Location: Right arm)  Pulse 100  Temp 97.2  F (36.2  C) (Axillary)  Resp 20  Wt 49.3  kg (108 lb 9.6 oz)  SpO2 92%  Breastfeeding? No  BMI 19.86 kg/m2  Patient updated    Impression & Plan      Medical Decision Making:  This is an 88 year old female who lives at a care facility, at [facility] and fell on her right hip in the dining hooks. She does have a displaced transverse fracture of the upper neck of the right femur and a questionable compression of L1. A Glynn was placed. Her CBC was normal.   Orthopedics was consulted. She will need to be admitted. CMS is intact. Case is discussed with the hospitalist.     The patient does not have a history of atrial fibrillation in her chart and does have atrial fibrillation, but is not very tachycardic (105 is the current rate). A Glynn has been placed. Troponin does not show any signs of ischemia. Urine has come back positive with nitrites, leukocyte esterase and WBCs and the patient will be given IV antibiotics. Patient did not express any urinary symptoms so a culture will be sent. IV rocephin was given.  IV hydration and pain medicine is ordered.    ?incidental old vs new L1 compression fracture noted.      Diagnosis:    ICD-10-CM    1. Closed fracture of neck of right femur, initial encounter (H) S72.001A Basic metabolic panel     Troponin I     UA with Microscopic     Potassium     Potassium     Urine Culture Aerobic Bacterial   2. Closed compression fracture of first lumbar vertebra, initial encounter (H) S32.010A    3. Atrial fibrillation, unspecified type (H) I48.91    4. UTI (urinary tract infection) with pyuria N39.0        Disposition:  Admitted to Dr. Wyman to an adult med/surg bed    Scribe Disclosure:  I, Roman Lwe, am serving as a scribe on 8/22/2018 at 8:20 PM to personally document services performed by Zita Randall MD based on my observations and the provider's statements to me.     Roman Lew  8/22/2018   [facility] EMERGENCY DEPARTMENT       Zita Randall MD  08/23/18 2368

## 2018-08-23 NOTE — PLAN OF CARE
Problem: Patient Care Overview  Goal: Plan of Care/Patient Progress Review  Arrived to room 600 from ER at 0020 via cart, disoriented x4. IV patent and infusing, bryan patent, admitted for right femoral fx. unable to rate pain r/t dementia, grimacing when repositioning. Bed bath given, NPO from midnight. Ace bag to right thigh. PCD's on. On bedrest. Admission profile started. Sleeping without pain discomfort sign and symptoms when not repositioning. Plan is surgery if Pt. decides.

## 2018-08-23 NOTE — CONSULTS
[facility] Orthopedic Consultation    April Donnelly MRN# [id number]   Age: 88 year old YOB: 1930     Date of Admission:  8/22/2018    Reason for consult: Right femoral neck fracture       Requesting physician: Dr Johnny Wyman       Level of consult: Consult, follow and place orders           Assessment and Plan:   Assessment:   Right femoral neck fracture      Plan:   Hemiarthroplasty vs comfort care  Discussed with family present - they would like to talk to GIOVANI Alfaro before making decision  NPO after midnight   May have diet   Hold ASA  Bed rest  Glynn Cath  Pain medication as needed, avoid narcotics if possible           Chief Complaint:   Right hip femoral neck fracture         History of Present Illness:   This patient is a 88 year old female who presents with the following condition requiring a hospital admission:    Patient with dementia and sleepy this AM. HPI gathered from chart. Patient resides at [facility] ([facility]. She fell in the dining hooks last night on a hardwood floor onto her right side. She was brought to [facility] ED for further evaluation.           Past Medical History:     Past Medical History:   Diagnosis Date     Adjustment disorder with mixed anxiety and depressed mood 12/2/2016     Adjustment disorder with mixed anxiety and depressed mood 12/2/2016     Cerebrovascular accident (H) 12/2/2016     CVA (cerebral vascular accident) (H)      Hemorrhagic stroke (H) 12/2/2016     Hypertensive heart disease without heart failure 12/2/2016     Severe dementia 12/2/2016     Syncope 12/2/2016     Thyroid disease              Past Surgical History:     Past Surgical History:   Procedure Laterality Date     THYROIDECTOMY               Social History:     Social History   Substance Use Topics     Smoking status: Never Smoker     Smokeless tobacco: Never Used     Alcohol use No             Family History:     Family History   Problem Relation Age of Onset     Family  history unknown: Yes             Immunizations:     VACCINE/DOSE   Diptheria   DPT   DTAP   HBIG   Hepatitis A   Hepatitis B   HIB   Influenza   Measles   Meningococcal   MMR   Mumps   Pneumococcal   Polio   Rubella   Small Pox   TDAP   Varicella   Zoster             Allergies:     Allergies   Allergen Reactions     Hydrocodone      Lisinopril      Metoprolol      Terazosin              Medications:     Current Facility-Administered Medications   Medication     acetaminophen (TYLENOL) tablet 1,000 mg     acetaminophen (TYLENOL) tablet 500 mg     bisacodyl (DULCOLAX) EC tablet 5 mg    Or     bisacodyl (DULCOLAX) EC tablet 10 mg    Or     bisacodyl (DULCOLAX) EC tablet 15 mg     bisacodyl (DULCOLAX) Suppository 10 mg     cefTRIAXone (ROCEPHIN) 1 g vial to attach to  mL bag for ADULTS or NS 50 mL bag for PEDS     citalopram (celeXA) tablet 10 mg     HYDROmorphone (PF) (DILAUDID) injection 0.5 mg     hydrOXYzine (ATARAX) tablet 10 mg     hydrOXYzine (ATARAX) tablet 25 mg    Or     hydrOXYzine (ATARAX) tablet 50 mg     levothyroxine (SYNTHROID/LEVOTHROID) tablet 50 mcg     magnesium hydroxide (MILK OF MAGNESIA) suspension 30 mL     magnesium sulfate 4 g in 100 mL sterile water (premade)     melatonin tablet 1 mg     metoprolol (LOPRESSOR) injection 2.5 mg     metoprolol tartrate (LOPRESSOR) half-tab 12.5 mg     naloxone (NARCAN) injection 0.1-0.4 mg     ondansetron (ZOFRAN-ODT) ODT tab 4 mg    Or     ondansetron (ZOFRAN) injection 4 mg     potassium chloride (KLOR-CON) Packet 20-40 mEq     potassium chloride 10 mEq in 100 mL intermittent infusion with 10 mg lidocaine     potassium chloride 10 mEq in 100 mL sterile water intermittent infusion (premix)     potassium chloride SA (K-DUR/KLOR-CON M) CR tablet 20-40 mEq     senna-docusate (SENOKOT-S;PERICOLACE) 8.6-50 MG per tablet 1 tablet    Or     senna-docusate (SENOKOT-S;PERICOLACE) 8.6-50 MG per tablet 2 tablet     sodium chloride 0.9% infusion     traMADol  (ULTRAM) tablet 50 mg             Review of Systems:   CV: NEGATIVE for chest pain, palpitations or peripheral edema          Physical Exam:   All vitals have been reviewed  Patient Vitals for the past 24 hrs:   BP Temp Temp src Pulse Resp SpO2 Weight   08/23/18 0403 149/70 97.2  F (36.2  C) Axillary 100 20 92 % -   08/23/18 0035 173/67 97.9  F (36.6  C) Oral 108 18 93 % 49.3 kg (108 lb 9.6 oz)   08/22/18 2313 (!) 182/97 - - 107 - 95 % -   08/22/18 2215 - - - - - 97 % -   08/22/18 2200 160/62 - - - - 98 % -   08/22/18 2145 - - - - - 99 % -   08/22/18 2030 152/70 - - - - - -   08/22/18 2021 136/88 97.1  F (36.2  C) Temporal 76 20 99 % -       Intake/Output Summary (Last 24 hours) at 08/23/18 0907  Last data filed at 08/23/18 0400   Gross per 24 hour   Intake                0 ml   Output              375 ml   Net             -375 ml     Patient laying comfortably in bed, resting on exam.   No skin tears, abrasions or lacerations noted on right hip/thigh  Right leg appears mildly shortened and externally rotated  Bilateral calves are soft, non-tender.  Bilateral lower extremity is NVI.  Sensation intact bilateral lower extremities  Active dorsi and plantar flexion bilaterally  +Dp pulse            Data:   All laboratory data reviewed  Results for orders placed or performed during the hospital encounter of 08/22/18   XR Pelvis and Hip Right 2 Views    Narrative    PELVIS AND HIP RIGHT TWO VIEWS 8/22/2018 8:56 PM     COMPARISON: None    HISTORY: Fall, pain.       Impression    IMPRESSION: There is a moderately displaced transverse fracture  through the upper neck of the proximal right femur with cephalad  displacement of the distal fragment. No other fractures noted. Hip  joint alignment bilaterally appears normal.    SAMIRA GOMEZ MD   Lumbar spine XR, 2-3 views    Narrative    LUMBAR SPINE TWO TO THREE VIEWS  8/22/2018 8:56 PM     COMPARISON: None    HISTORY: Fall, question pain.       Impression    IMPRESSION: There  is age indeterminate moderate anterior wedge  compression deformity of the L1 vertebral body. Vertebral body heights  of the lumbar spine are otherwise normal. Alignment of the lumbar  vertebrae is normal. There is no definite evidence for recent fracture  of the lumbar spine.    SAMIRA GOMEZ MD   CBC with platelets differential   Result Value Ref Range    WBC 6.6 4.0 - 11.0 10e9/L    RBC Count 4.28 3.8 - 5.2 10e12/L    Hemoglobin 13.8 11.7 - 15.7 g/dL    Hematocrit 42.2 35.0 - 47.0 %    MCV 99 78 - 100 fl    MCH 32.2 26.5 - 33.0 pg    MCHC 32.7 31.5 - 36.5 g/dL    RDW 12.8 10.0 - 15.0 %    Platelet Count 259 150 - 450 10e9/L    Diff Method Automated Method     % Neutrophils 56.4 %    % Lymphocytes 25.9 %    % Monocytes 7.2 %    % Eosinophils 9.2 %    % Basophils 0.8 %    % Immature Granulocytes 0.5 %    Nucleated RBCs 0 0 /100    Absolute Neutrophil 3.7 1.6 - 8.3 10e9/L    Absolute Lymphocytes 1.7 0.8 - 5.3 10e9/L    Absolute Monocytes 0.5 0.0 - 1.3 10e9/L    Absolute Eosinophils 0.6 0.0 - 0.7 10e9/L    Absolute Basophils 0.1 0.0 - 0.2 10e9/L    Abs Immature Granulocytes 0.0 0 - 0.4 10e9/L    Absolute Nucleated RBC 0.0    Basic metabolic panel   Result Value Ref Range    Sodium 138 133 - 144 mmol/L    Potassium Canceled, Test credited 3.4 - 5.3 mmol/L    Chloride 108 94 - 109 mmol/L    Carbon Dioxide 27 20 - 32 mmol/L    Anion Gap 3 3 - 14 mmol/L    Glucose 93 70 - 99 mg/dL    Urea Nitrogen 27 7 - 30 mg/dL    Creatinine 0.59 0.52 - 1.04 mg/dL    GFR Estimate >90 >60 mL/min/1.7m2    GFR Estimate If Black >90 >60 mL/min/1.7m2    Calcium 8.4 (L) 8.5 - 10.1 mg/dL   Troponin I   Result Value Ref Range    Troponin I ES <0.015 0.000 - 0.045 ug/L   UA with Microscopic   Result Value Ref Range    Color Urine Yellow     Appearance Urine Slightly Cloudy     Glucose Urine Negative NEG^Negative mg/dL    Bilirubin Urine Negative NEG^Negative    Ketones Urine Negative NEG^Negative mg/dL    Specific Gravity Urine 1.020 1.003 -  1.035    Blood Urine Small (A) NEG^Negative    pH Urine 6.0 5.0 - 7.0 pH    Protein Albumin Urine Negative NEG^Negative mg/dL    Urobilinogen mg/dL 0.0 0.0 - 2.0 mg/dL    Nitrite Urine Positive (A) NEG^Negative    Leukocyte Esterase Urine Large (A) NEG^Negative    Source Catheterized Urine     WBC Urine >182 (H) 0 - 5 /HPF    RBC Urine 7 (H) 0 - 2 /HPF    Bacteria Urine Many (A) NEG^Negative /HPF    Squamous Epithelial /HPF Urine 1 0 - 1 /HPF    Mucous Urine Present (A) NEG^Negative /LPF   Potassium   Result Value Ref Range    Potassium 3.9 3.4 - 5.3 mmol/L   EKG 12 lead   Result Value Ref Range    Interpretation ECG Click View Image link to view waveform and result    Urine Culture Aerobic Bacterial   Result Value Ref Range    Specimen Description Midstream Urine     Special Requests Specimen received in preservative     Culture Micro PENDING           Attestation:  I have reviewed today's vital signs, notes, medications, labs and imaging with Dr. Miguel Perez.  Amount of time performed on this consult: 20 minutes.    Bela Russo PA-C

## 2018-08-23 NOTE — PROVIDER NOTIFICATION
Left a vm with Dr Buck's care coordinator that family held a phone conference and they do wish to proceed with surgery.

## 2018-08-23 NOTE — PROGRESS NOTES
[facility]    Hospitalist Progress Note    Date of Service (when I saw the patient): 08/23/2018    Assessment & Plan      88 year old female with PMH including CVA, hypertension, severe dementia, anxiety who presents with a fall.  She resides at [facility] and had a fall on hardwood floor in the dining area.  She was brought to the ER by medics and here in the ER was noted to be wincing upon examination of her right hip.     Here in the ER basic lab workup was unremarkable.  Imaging was notable for a moderately displaced transverse fracture through the upper neck of the proximal right femur with cephalad displacement of the distal fragment.  EKG here in the emergency room did show atrial fibrillation with rate in the low 100s which is reportedly a new diagnosis.  Orthopedic surgery was contacted and patient was admitted here for further care.        Assessment and Plan:   1. Right femoral neck fracture:   --admit to inpatient status  -- consult orthopedics, plan for tentative surgery tomorrow. I talked to patient's son today at bedside who wants to speak with other family members and his brother who is POA, although they are leaning towards surgery   -- Discussed risk of marcela-operative morbidity given prior hx of CVA, advanced age, although otherwise appears optimized  -- ECHO reviews, shows normal LV and preserved EF   --scheduled tylenol  --prn ultram and hydroxyzine, low dose dilaudid if needed, minimize narcoticw  --bowel regimen.     2.   History of prior hemorrhagic stroke and severe dementia: She is unable to provide any meaningful history other than some yes or no questions. At this point we will hold her aspirin and would keep delirium precautions as she is at some increased risk while hospitalized with an acute medical problem.  She reportedly resides in memory care.     3.   Hypertension: start metoprolol. Prn IV metoprolol for rates >110.     4.   Atrial fibrillation,  possible new diagnosis: Discussed with family at bedside, they are not sure if this is entirely new and might have been present before, talked about risk of stroke and anti-coagulation, would not start now anyway with upcoming surgery, and not a good candidate with a good candidate due to fall risk and hx of hemorrhagic stroke, family in agreement.   -- check an echocardiogram and TSH.    -- Continue metoprolol low dose 12.5 mg BID      5.  age indeterminate moderate anterior wedge compression deformity of the L1 vertebral body: denies back pain at this time.  Monitor.      6. S/p Pacemaker placement.     7. Possible UTI. On IV ceftriaxone      DVT Prophylaxis: Pneumatic Compression Devices    Code Status: DNR/DNI    Disposition: Expected discharge = pending surgery     Updated son at bedside    Jean Garcia    Interval History   Chart reviewed and patient seen. Case discussed with nursing staff.     Patient looks comfortable, pleasantly confused, reports some mild pain, Denies any chest pain, shortness of breath No other complaints voiced.     -Data reviewed today: I reviewed all new labs and imaging results over the last 24 hours. I personally reviewed     Physical Exam   Temp: 98.8  F (37.1  C) Temp src: Temporal BP: (!) 131/91 Pulse: 101   Resp: 20 SpO2: 96 % O2 Device: None (Room air)    Vitals:    08/23/18 0035   Weight: 49.3 kg (108 lb 9.6 oz)     Vital Signs with Ranges  Temp:  [97.1  F (36.2  C)-98.8  F (37.1  C)] 98.8  F (37.1  C)  Pulse:  [] 101  Resp:  [18-20] 20  BP: (131-182)/(62-97) 131/91  SpO2:  [92 %-99 %] 96 %  I/O last 3 completed shifts:  In: -   Out: 375 [Urine:375]    GENERAL:  Awake.  No acute distress.  PSYCH: confused, dementia, speaks in short sentences, no acute agitation   HEENT:  Neck is Supple, trachea is midline, conjunctiva clear  CARDIOVASCULAR: Irregular rate and rhythm, Normal S1, S2, no loud murmurs  PULMONARY:  Clear to auscultation bilaterally   GI: Abdomen is soft,  non tender, non-distended, bowel sounds present. No rebound or guarding   SKIN:  No cyanosis or clubbing, no obvious exanthems on exposed areas   MSK: Extremities are warm and well perfused. No pitting edema   Neuro: Awake , confused, pleasant    Medications     sodium chloride 75 mL/hr at 08/23/18 1035       acetaminophen (TYLENOL) tablet 1,000 mg  1,000 mg Oral BID     cefTRIAXone  1 g Intravenous Q24H     citalopram  10 mg Oral Daily     HYDROmorphone  0.5 mg Intravenous Once     hydrOXYzine  10 mg Oral Daily     levothyroxine  50 mcg Oral Daily     metoprolol tartrate  12.5 mg Oral BID     senna-docusate  1 tablet Oral BID    Or     senna-docusate  2 tablet Oral BID       Data   All new lab data and imaging results from today have been reviewed       Recent Labs  Lab 08/23/18  0945 08/22/18  2148 08/22/18  2031   WBC  --   --  6.6   HGB  --   --  13.8   MCV  --   --  99   PLT  --   --  259   NA  --   --  138   POTASSIUM 4.1 3.9 Canceled, Test credited   CHLORIDE  --   --  108   CO2  --   --  27   BUN  --   --  27   CR  --   --  0.59   ANIONGAP  --   --  3   BOLA  --   --  8.4*   GLC  --   --  93   TROPI  --   --  <0.015       Recent Results (from the past 24 hour(s))   XR Pelvis and Hip Right 2 Views    Narrative    PELVIS AND HIP RIGHT TWO VIEWS 8/22/2018 8:56 PM     COMPARISON: None    HISTORY: Fall, pain.       Impression    IMPRESSION: There is a moderately displaced transverse fracture  through the upper neck of the proximal right femur with cephalad  displacement of the distal fragment. No other fractures noted. Hip  joint alignment bilaterally appears normal.    SAMIRA GOMEZ MD   Lumbar spine XR, 2-3 views    Narrative    LUMBAR SPINE TWO TO THREE VIEWS  8/22/2018 8:56 PM     COMPARISON: None    HISTORY: Fall, question pain.       Impression    IMPRESSION: There is age indeterminate moderate anterior wedge  compression deformity of the L1 vertebral body. Vertebral body heights  of the lumbar spine are  otherwise normal. Alignment of the lumbar  vertebrae is normal. There is no definite evidence for recent fracture  of the lumbar spine.    SAMIRA GOMEZ MD

## 2018-08-23 NOTE — H&P
[facility]  Hospitalist Admission Note  Name: April Donnelly    MRN: [id number]  YOB: 1930    Age: 88 year old  Date of admission: 8/22/2018  Primary care provider: Kvng Cowart    Chief Complaint:  Hip fracture    April Donnelly is a 88 year old female with PMH including CVA, hypertension, severe dementia, anxiety who presents with a fall.  She resides at [facility] and had a fall on hardwood floor in the dining area.  She was brought to the ER by medics and here in the ER was noted to be wincing upon examination of her right hip.    Here in the ER basic lab workup was unremarkable.  Imaging was notable for a moderately displaced transverse fracture through the upper neck of the proximal right femur with cephalad displacement of the distal fragment.  EKG here in the emergency room did show atrial fibrillation with rate in the low 100s which is reportedly a new diagnosis to her knowledge.  I did attempt to call her son via phone but could not reach him and as such is not able to gather additional history from him.    Orthopedic surgery was contacted and now I am asked to admit her for further care.      Assessment and Plan:   1. Right femoral neck fracture: as above.  In the setting of her dementia she is unable to provide meaningful history but it sounds as though this was likely a mechanical fall in the dining room.  Will monitor on telemetry to evaluate for heart block check an echocardiogram given concern for possible new A. fib.  Otherwise in spite of her DNR/DNI status it seems reasonable to pursue surgery to repair this hip for palliative reasons as much as restorative purposes and then continue to discuss goals of care pending her recovery.  --admit to inpatient status  --consult orthopedics  --npo at midnight  --EKG shows likely new a-fib.  Would control rate, check TSH and TTE to evaluate for significant cardiomyopathy but otherwise there is no obvious  optimization needed prior to surgery.  --scheduled tylenol  --prn ultram and hydroxyzine  --bowel regimen.    2.   History of prior hemorrhagic stroke and severe dementia: She is unable to provide any meaningful history to me other than some yes or no questions such as denying pain.  At this point we will hold her aspirin and would keep delirium precautions as she is at some increased risk while hospitalized with an acute medical problem.  She reportedly resides in memory care.    3.   History of hypertension: resume metoprolol 25 mg.  Prn IV metoprolol for rates >110.    4.   Atrial fibrillation, possible new diagnosis: I was unable to reach family to determine if this is a new diagnosis or not.  Currently she has only mild tachycardia to the low 100s.  I will check an echocardiogram and a TSH.  I am not sure that much can be done about this preoperatively other than to evaluate for cardiomyopathy or major structural heart disease.  Will start a low-dose beta-blocker with metoprolol 12.5 mg twice daily and as needed IV metoprolol for rapid rates to help encourage rate control.  It is completely unclear when her atrial fibrillation started and as such is not a candidate for immediate cardioversion to have some suspicion that this may be a chronic paroxysmal issue.  I suspect that the stress of her broken hip may be a trigger here and therefore fixing her hip surgically would make it more likely for her to maintain a normal rate and/or return to normal rhythm.    5.  age indeterminate moderate anterior wedge compression deformity of the L1 vertebral body: denies back pain at this time.  Monitor.  Uncertain chronicity.  Consider bracing for comfort if symptoms develop.  CMS in legs intact.    DVT Prophylaxis: Pneumatic Compression Devices  Code Status: DNR / DNI  Discharge Dispo: Admit inpatient status.      History of Present Illness:  April Donnelly is a 88 year old female with PMH including CVA, hypertension, severe  dementia, anxiety who presents with a fall.  She resides at [facility] and had a fall on hardwood floor in the dining area.  There was no clear injury to her head or neck.  She was not complaining of pain immediately.  She was brought to the ER by medics and here in the ER was noted to be wincing upon examination of her right hip.    Here in the ER basic lab workup was unremarkable.  Imaging was notable for a moderately displaced transverse fracture through the upper neck of the proximal right femur with cephalad displacement of the distal fragment.  Orthopedic surgery was contacted and now I am asked to admit her for further care.    EKG here in the emergency room did show atrial fibrillation with rate in the low 100s which is reportedly a new diagnosis to her knowledge.  I did attempt to call her son via phone but could not reach him and as such is not able to gather additional history from him.     Past Medical History:  Past Medical History:   Diagnosis Date     Adjustment disorder with mixed anxiety and depressed mood 12/2/2016     Adjustment disorder with mixed anxiety and depressed mood 12/2/2016     Cerebrovascular accident (H) 12/2/2016     CVA (cerebral vascular accident) (H)      Hemorrhagic stroke (H) 12/2/2016     Hypertensive heart disease without heart failure 12/2/2016     Severe dementia 12/2/2016     Syncope 12/2/2016     Thyroid disease      Past Surgical History:  Past Surgical History:   Procedure Laterality Date     THYROIDECTOMY       Social History:  Social History   Substance Use Topics     Smoking status: Never Smoker     Smokeless tobacco: Never Used     Alcohol use No     Social History     Social History Narrative     Family History:  Family History   Problem Relation Age of Onset     Family history unknown: Yes     Allergies:  Allergies   Allergen Reactions     Hydrocodone      Lisinopril      Metoprolol      Terazosin      Medications:  Prior to Admission Medications    Prescriptions Last Dose Informant Patient Reported? Taking?   ACETAMINOPHEN PO   Yes No   Sig: Take 500 mg by mouth 2 times daily as needed for pain   Acetaminophen (TYLENOL PO) 8/22/2018 at am  Yes Yes   Sig: Take 1,000 mg by mouth 2 times daily    HYDROXYZINE HCL PO 8/22/2018 at Unknown time  Yes Yes   Sig: Take 10 mg by mouth daily   Menthol-Zinc Oxide (CALMOSEPTINE) 0.44-20.6 % OINT 8/22/2018 at Unknown time  No Yes   Sig: Externally apply 71 g topically 2 times daily APPLY TOPICALLY TO COCCYX TWICE DAILY AND WITH ALL INCONTINENT CHANGES FOR SKIN ITCH AND BREAKDOWN   aspirin 81 MG EC tablet 8/22/2018 at Unknown time  No Yes   Sig: Take 1 tablet (81 mg) by mouth daily   camphor-menthol (DERMASARRA) 0.5-0.5 % LOTN 8/22/2018 at Unknown time  No Yes   Sig: Apply lotion to body daily   citalopram (CELEXA) 10 MG tablet 8/22/2018 at Unknown time  No Yes   Sig: Take 1 tablet (10 mg) by mouth daily   levothyroxine (SYNTHROID/LEVOTHROID) 50 MCG tablet 8/22/2018 at Unknown time  No Yes   Sig: TAKE 1 TABLET BY MOUTH ONCE DAILY   metoprolol tartrate (LOPRESSOR) 25 MG tablet 8/22/2018 at Unknown time  No Yes   Sig: Take 0.5 tablets (12.5 mg) by mouth daily      Facility-Administered Medications: None       Review of Systems:  A Comprehensive greater than 10 system review of systems was carried out.  Pertinent positives and negatives are noted above.  Otherwise negative for contributory information.     Physical Exam:  Blood pressure 136/88, pulse 76, temperature 97.1  F (36.2  C), temperature source Temporal, resp. rate 20, SpO2 99 %, not currently breastfeeding.  Wt Readings from Last 1 Encounters:   07/05/18 51.8 kg (114 lb 4.8 oz)     Exam:  General: Alert, awake, no acute distress.  Calm and pleasantly confused  HEENT: NC/AT, eyes anicteric, external occular movements intact, face symmetric.  Dentition WNL, MM moist.  Cardiac: irregularly irregular, mildly tachycardic, S1, S2.  No murmurs appreciated.  Pulmonary: Normal  chest rise, normal work of breathing.  Lungs CTA BL  Abdomen: soft, non-tender, non-distended.  Bowel Sounds Present.  No guarding.  Extremities: Warm, well perfused.  Skin: no rashes or lesions noted.  Warm and Dry.  Neuro: No focal deficits noted.  Speech clear.    Psych: Pleasantly confused    Data:  EKG:  Atrial fibrillation with slight tachycardia.  Imaging:  Results for orders placed or performed during the hospital encounter of 08/22/18   XR Pelvis and Hip Right 2 Views    Narrative    PELVIS AND HIP RIGHT TWO VIEWS 8/22/2018 8:56 PM     COMPARISON: None    HISTORY: Fall, pain.       Impression    IMPRESSION: There is a moderately displaced transverse fracture  through the upper neck of the proximal right femur with cephalad  displacement of the distal fragment. No other fractures noted. Hip  joint alignment bilaterally appears normal.    SAMIRA GOMEZ MD   Lumbar spine XR, 2-3 views    Narrative    LUMBAR SPINE TWO TO THREE VIEWS  8/22/2018 8:56 PM     COMPARISON: None    HISTORY: Fall, question pain.       Impression    IMPRESSION: There is age indeterminate moderate anterior wedge  compression deformity of the L1 vertebral body. Vertebral body heights  of the lumbar spine are otherwise normal. Alignment of the lumbar  vertebrae is normal. There is no definite evidence for recent fracture  of the lumbar spine.    SAMIRA GOMEZ MD     Labs:    Recent Labs  Lab 08/22/18 2031   WBC 6.6   HGB 13.8   HCT 42.2   MCV 99             Lab Results   Component Value Date     08/22/2018     05/12/2018     04/26/2017    Lab Results   Component Value Date    CHLORIDE 108 08/22/2018    CHLORIDE 107 05/12/2018    CHLORIDE 106 04/26/2017    Lab Results   Component Value Date    BUN 27 08/22/2018    BUN 21 05/12/2018    BUN 30 04/26/2017      Lab Results   Component Value Date    POTASSIUM Canceled, Test credited 08/22/2018    POTASSIUM 3.7 05/12/2018    POTASSIUM 4.1 04/26/2017    Lab Results    Component Value Date    CO2 27 08/22/2018    CO2 29 05/12/2018    CO2 27 04/26/2017    Lab Results   Component Value Date    CR 0.59 08/22/2018    CR 0.80 05/12/2018    CR 0.65 04/26/2017            Gunner Wyman MD  Hospitalist  [facility]

## 2018-08-23 NOTE — PLAN OF CARE
Problem: Patient Care Overview  Goal: Plan of Care/Patient Progress Review  Outcome: No Change  Day RN  VS monitored, mild tachycardia which decreased to WNL with Metoprolol, DNR/DNI, strict bedrest, repositioned side to side frequently with A2, R LE shorter and externally rotated, R LE warm to touch, unable to assess dorsi/plantar as pt does not follow commands, disoriented x4 but is calm and cooperative, remote tele, IV Rocephin cont, bryan patent w/200 ml OP, echo performed: see results in EPIC, Tylenol/Atarax for pain, poor PO intake, cont to encourage PO/fluids, family updated at b/s, family will speak with each other and determine if they wish to proceed with surgery, will cont to monitor closely.

## 2018-08-24 ENCOUNTER — ANESTHESIA (OUTPATIENT)
Dept: SURGERY | Facility: CLINIC | Age: 83
DRG: 470 | End: 2018-08-24
Payer: MEDICARE

## 2018-08-24 ENCOUNTER — ANESTHESIA EVENT (OUTPATIENT)
Dept: SURGERY | Facility: CLINIC | Age: 83
DRG: 470 | End: 2018-08-24
Payer: MEDICARE

## 2018-08-24 ENCOUNTER — APPOINTMENT (OUTPATIENT)
Dept: GENERAL RADIOLOGY | Facility: CLINIC | Age: 83
DRG: 470 | End: 2018-08-24
Attending: ORTHOPAEDIC SURGERY
Payer: MEDICARE

## 2018-08-24 PROBLEM — Z96.649 S/P TOTAL HIP ARTHROPLASTY: Status: ACTIVE | Noted: 2018-08-24

## 2018-08-24 LAB
ABO + RH BLD: NORMAL
ABO + RH BLD: NORMAL
BACTERIA SPEC CULT: ABNORMAL
BACTERIA SPEC CULT: ABNORMAL
BLD GP AB SCN SERPL QL: NORMAL
BLOOD BANK CMNT PATIENT-IMP: NORMAL
CREAT SERPL-MCNC: 0.68 MG/DL (ref 0.52–1.04)
GFR SERPL CREATININE-BSD FRML MDRD: 81 ML/MIN/1.7M2
Lab: ABNORMAL
MAGNESIUM SERPL-MCNC: 1.8 MG/DL (ref 1.6–2.3)
PLATELET # BLD AUTO: 169 10E9/L (ref 150–450)
POTASSIUM SERPL-SCNC: 3.5 MMOL/L (ref 3.4–5.3)
POTASSIUM SERPL-SCNC: 4.4 MMOL/L (ref 3.4–5.3)
SPECIMEN EXP DATE BLD: NORMAL
SPECIMEN SOURCE: ABNORMAL

## 2018-08-24 PROCEDURE — 86900 BLOOD TYPING SEROLOGIC ABO: CPT | Performed by: ANESTHESIOLOGY

## 2018-08-24 PROCEDURE — 36415 COLL VENOUS BLD VENIPUNCTURE: CPT | Performed by: ANESTHESIOLOGY

## 2018-08-24 PROCEDURE — 25000125 ZZHC RX 250: Performed by: ORTHOPAEDIC SURGERY

## 2018-08-24 PROCEDURE — A9270 NON-COVERED ITEM OR SERVICE: HCPCS | Mod: GY | Performed by: ORTHOPAEDIC SURGERY

## 2018-08-24 PROCEDURE — 25000132 ZZH RX MED GY IP 250 OP 250 PS 637: Mod: GY | Performed by: INTERNAL MEDICINE

## 2018-08-24 PROCEDURE — 88305 TISSUE EXAM BY PATHOLOGIST: CPT | Mod: 26 | Performed by: ORTHOPAEDIC SURGERY

## 2018-08-24 PROCEDURE — A9270 NON-COVERED ITEM OR SERVICE: HCPCS | Mod: GY | Performed by: INTERNAL MEDICINE

## 2018-08-24 PROCEDURE — 86850 RBC ANTIBODY SCREEN: CPT | Performed by: ANESTHESIOLOGY

## 2018-08-24 PROCEDURE — 25000132 ZZH RX MED GY IP 250 OP 250 PS 637: Mod: GY | Performed by: ORTHOPAEDIC SURGERY

## 2018-08-24 PROCEDURE — 71000012 ZZH RECOVERY PHASE 1 LEVEL 1 FIRST HR: Performed by: ORTHOPAEDIC SURGERY

## 2018-08-24 PROCEDURE — 25000125 ZZHC RX 250: Performed by: INTERNAL MEDICINE

## 2018-08-24 PROCEDURE — 27210794 ZZH OR GENERAL SUPPLY STERILE: Performed by: ORTHOPAEDIC SURGERY

## 2018-08-24 PROCEDURE — 82565 ASSAY OF CREATININE: CPT | Performed by: ORTHOPAEDIC SURGERY

## 2018-08-24 PROCEDURE — 84132 ASSAY OF SERUM POTASSIUM: CPT | Performed by: ORTHOPAEDIC SURGERY

## 2018-08-24 PROCEDURE — 25000566 ZZH SEVOFLURANE, EA 15 MIN: Performed by: ORTHOPAEDIC SURGERY

## 2018-08-24 PROCEDURE — 71000013 ZZH RECOVERY PHASE 1 LEVEL 1 EA ADDTL HR: Performed by: ORTHOPAEDIC SURGERY

## 2018-08-24 PROCEDURE — 40000306 ZZH STATISTIC PRE PROC ASSESS II: Performed by: ORTHOPAEDIC SURGERY

## 2018-08-24 PROCEDURE — 88311 DECALCIFY TISSUE: CPT | Performed by: ORTHOPAEDIC SURGERY

## 2018-08-24 PROCEDURE — 88305 TISSUE EXAM BY PATHOLOGIST: CPT | Performed by: ORTHOPAEDIC SURGERY

## 2018-08-24 PROCEDURE — 27810169 ZZH OR IMPLANT GENERAL: Performed by: ORTHOPAEDIC SURGERY

## 2018-08-24 PROCEDURE — 25000125 ZZHC RX 250: Performed by: NURSE ANESTHETIST, CERTIFIED REGISTERED

## 2018-08-24 PROCEDURE — 25800025 ZZH RX 258: Performed by: ORTHOPAEDIC SURGERY

## 2018-08-24 PROCEDURE — 25000128 H RX IP 250 OP 636: Performed by: INTERNAL MEDICINE

## 2018-08-24 PROCEDURE — 36415 COLL VENOUS BLD VENIPUNCTURE: CPT | Performed by: INTERNAL MEDICINE

## 2018-08-24 PROCEDURE — 86901 BLOOD TYPING SEROLOGIC RH(D): CPT | Performed by: ANESTHESIOLOGY

## 2018-08-24 PROCEDURE — 37000009 ZZH ANESTHESIA TECHNICAL FEE, EACH ADDTL 15 MIN: Performed by: ORTHOPAEDIC SURGERY

## 2018-08-24 PROCEDURE — 99232 SBSQ HOSP IP/OBS MODERATE 35: CPT | Performed by: INTERNAL MEDICINE

## 2018-08-24 PROCEDURE — 37000008 ZZH ANESTHESIA TECHNICAL FEE, 1ST 30 MIN: Performed by: ORTHOPAEDIC SURGERY

## 2018-08-24 PROCEDURE — 83735 ASSAY OF MAGNESIUM: CPT | Performed by: INTERNAL MEDICINE

## 2018-08-24 PROCEDURE — 25000128 H RX IP 250 OP 636: Performed by: ORTHOPAEDIC SURGERY

## 2018-08-24 PROCEDURE — 85049 AUTOMATED PLATELET COUNT: CPT | Performed by: ORTHOPAEDIC SURGERY

## 2018-08-24 PROCEDURE — 25000128 H RX IP 250 OP 636: Performed by: ANESTHESIOLOGY

## 2018-08-24 PROCEDURE — 36000093 ZZH SURGERY LEVEL 4 1ST 30 MIN: Performed by: ORTHOPAEDIC SURGERY

## 2018-08-24 PROCEDURE — 25000128 H RX IP 250 OP 636: Performed by: NURSE ANESTHETIST, CERTIFIED REGISTERED

## 2018-08-24 PROCEDURE — 36000063 ZZH SURGERY LEVEL 4 EA 15 ADDTL MIN: Performed by: ORTHOPAEDIC SURGERY

## 2018-08-24 PROCEDURE — 12000007 ZZH R&B INTERMEDIATE

## 2018-08-24 PROCEDURE — C1776 JOINT DEVICE (IMPLANTABLE): HCPCS | Performed by: ORTHOPAEDIC SURGERY

## 2018-08-24 PROCEDURE — 84132 ASSAY OF SERUM POTASSIUM: CPT | Performed by: INTERNAL MEDICINE

## 2018-08-24 PROCEDURE — 88311 DECALCIFY TISSUE: CPT | Mod: 26 | Performed by: ORTHOPAEDIC SURGERY

## 2018-08-24 PROCEDURE — 0SRR019 REPLACEMENT OF RIGHT HIP JOINT, FEMORAL SURFACE WITH METAL SYNTHETIC SUBSTITUTE, CEMENTED, OPEN APPROACH: ICD-10-PCS | Performed by: ORTHOPAEDIC SURGERY

## 2018-08-24 PROCEDURE — 36415 COLL VENOUS BLD VENIPUNCTURE: CPT | Performed by: ORTHOPAEDIC SURGERY

## 2018-08-24 PROCEDURE — 73502 X-RAY EXAM HIP UNI 2-3 VIEWS: CPT

## 2018-08-24 DEVICE — BONE CEMENT SIMPLEX W/TOBRAMYCIN 6197-9-001: Type: IMPLANTABLE DEVICE | Site: HIP | Status: FUNCTIONAL

## 2018-08-24 DEVICE — BONE CEMENT MIXING SYSTEM W/FEM PRESSURIZER 06065730000: Type: IMPLANTABLE DEVICE | Site: HIP | Status: FUNCTIONAL

## 2018-08-24 DEVICE — BONE CEMENT RESTRICTOR BUCK FEMORAL 18.5MM 129418: Type: IMPLANTABLE DEVICE | Site: HIP | Status: FUNCTIONAL

## 2018-08-24 DEVICE — IMPLANTABLE DEVICE: Type: IMPLANTABLE DEVICE | Site: HIP | Status: FUNCTIONAL

## 2018-08-24 DEVICE — IMP HEAD FEMORAL SNR COBALT 28MM -3 71302803: Type: IMPLANTABLE DEVICE | Site: HIP | Status: FUNCTIONAL

## 2018-08-24 DEVICE — BONE CEMENT SIMPLEX FULL DOSE 6191-1-001: Type: IMPLANTABLE DEVICE | Site: HIP | Status: FUNCTIONAL

## 2018-08-24 DEVICE — IMP STEM FEM HIP SNN SYNERGY CEMENTED SZ 10 71316010: Type: IMPLANTABLE DEVICE | Site: HIP | Status: FUNCTIONAL

## 2018-08-24 DEVICE — IMP CENTRALIZER DISTAL BI POLAR SNN INVIS 08MM: Type: IMPLANTABLE DEVICE | Site: HIP | Status: FUNCTIONAL

## 2018-08-24 RX ORDER — ACETAMINOPHEN 325 MG/1
650 TABLET ORAL EVERY 4 HOURS PRN
Status: DISCONTINUED | OUTPATIENT
Start: 2018-08-27 | End: 2018-08-28 | Stop reason: HOSPADM

## 2018-08-24 RX ORDER — GLYCINE 1.5 G/100ML
SOLUTION IRRIGATION PRN
Status: DISCONTINUED | OUTPATIENT
Start: 2018-08-24 | End: 2018-08-24 | Stop reason: HOSPADM

## 2018-08-24 RX ORDER — ONDANSETRON 2 MG/ML
4 INJECTION INTRAMUSCULAR; INTRAVENOUS EVERY 6 HOURS
Status: DISPENSED | OUTPATIENT
Start: 2018-08-24 | End: 2018-08-25

## 2018-08-24 RX ORDER — HYDROXYZINE HYDROCHLORIDE 25 MG/1
25 TABLET, FILM COATED ORAL 3 TIMES DAILY PRN
Status: DISCONTINUED | OUTPATIENT
Start: 2018-08-24 | End: 2018-08-28 | Stop reason: HOSPADM

## 2018-08-24 RX ORDER — CEFAZOLIN SODIUM 1 G/3ML
1 INJECTION, POWDER, FOR SOLUTION INTRAMUSCULAR; INTRAVENOUS SEE ADMIN INSTRUCTIONS
Status: DISCONTINUED | OUTPATIENT
Start: 2018-08-24 | End: 2018-08-24 | Stop reason: HOSPADM

## 2018-08-24 RX ORDER — NALOXONE HYDROCHLORIDE 0.4 MG/ML
.1-.4 INJECTION, SOLUTION INTRAMUSCULAR; INTRAVENOUS; SUBCUTANEOUS
Status: DISCONTINUED | OUTPATIENT
Start: 2018-08-24 | End: 2018-08-24

## 2018-08-24 RX ORDER — HYDROMORPHONE HYDROCHLORIDE 1 MG/ML
.3-.5 INJECTION, SOLUTION INTRAMUSCULAR; INTRAVENOUS; SUBCUTANEOUS
Status: DISCONTINUED | OUTPATIENT
Start: 2018-08-24 | End: 2018-08-26 | Stop reason: ALTCHOICE

## 2018-08-24 RX ORDER — PROPOFOL 10 MG/ML
INJECTION, EMULSION INTRAVENOUS PRN
Status: DISCONTINUED | OUTPATIENT
Start: 2018-08-24 | End: 2018-08-24

## 2018-08-24 RX ORDER — DEXAMETHASONE SODIUM PHOSPHATE 4 MG/ML
INJECTION, SOLUTION INTRA-ARTICULAR; INTRALESIONAL; INTRAMUSCULAR; INTRAVENOUS; SOFT TISSUE PRN
Status: DISCONTINUED | OUTPATIENT
Start: 2018-08-24 | End: 2018-08-24

## 2018-08-24 RX ORDER — FENTANYL CITRATE 50 UG/ML
INJECTION, SOLUTION INTRAMUSCULAR; INTRAVENOUS PRN
Status: DISCONTINUED | OUTPATIENT
Start: 2018-08-24 | End: 2018-08-24

## 2018-08-24 RX ORDER — CEFAZOLIN SODIUM 2 G/100ML
2 INJECTION, SOLUTION INTRAVENOUS
Status: COMPLETED | OUTPATIENT
Start: 2018-08-24 | End: 2018-08-24

## 2018-08-24 RX ORDER — OXYCODONE HYDROCHLORIDE 5 MG/1
5-10 TABLET ORAL EVERY 4 HOURS PRN
Status: DISCONTINUED | OUTPATIENT
Start: 2018-08-24 | End: 2018-08-26 | Stop reason: ALTCHOICE

## 2018-08-24 RX ORDER — ONDANSETRON 2 MG/ML
4 INJECTION INTRAMUSCULAR; INTRAVENOUS EVERY 6 HOURS PRN
Status: DISCONTINUED | OUTPATIENT
Start: 2018-08-24 | End: 2018-08-28 | Stop reason: HOSPADM

## 2018-08-24 RX ORDER — CEFTRIAXONE 1 G/1
1 INJECTION, POWDER, FOR SOLUTION INTRAMUSCULAR; INTRAVENOUS EVERY 24 HOURS
Status: DISCONTINUED | OUTPATIENT
Start: 2018-08-25 | End: 2018-08-25

## 2018-08-24 RX ORDER — CEFAZOLIN SODIUM 1 G/50ML
1 INJECTION, SOLUTION INTRAVENOUS EVERY 8 HOURS
Status: COMPLETED | OUTPATIENT
Start: 2018-08-24 | End: 2018-08-25

## 2018-08-24 RX ORDER — NEOSTIGMINE METHYLSULFATE 1 MG/ML
VIAL (ML) INJECTION PRN
Status: DISCONTINUED | OUTPATIENT
Start: 2018-08-24 | End: 2018-08-24

## 2018-08-24 RX ORDER — LIDOCAINE 40 MG/G
CREAM TOPICAL
Status: DISCONTINUED | OUTPATIENT
Start: 2018-08-24 | End: 2018-08-24 | Stop reason: HOSPADM

## 2018-08-24 RX ORDER — ONDANSETRON 4 MG/1
4 TABLET, ORALLY DISINTEGRATING ORAL EVERY 6 HOURS PRN
Status: DISCONTINUED | OUTPATIENT
Start: 2018-08-24 | End: 2018-08-28 | Stop reason: HOSPADM

## 2018-08-24 RX ORDER — LIDOCAINE HYDROCHLORIDE 10 MG/ML
INJECTION, SOLUTION INFILTRATION; PERINEURAL PRN
Status: DISCONTINUED | OUTPATIENT
Start: 2018-08-24 | End: 2018-08-24

## 2018-08-24 RX ORDER — SODIUM CHLORIDE, SODIUM LACTATE, POTASSIUM CHLORIDE, CALCIUM CHLORIDE 600; 310; 30; 20 MG/100ML; MG/100ML; MG/100ML; MG/100ML
INJECTION, SOLUTION INTRAVENOUS CONTINUOUS
Status: DISCONTINUED | OUTPATIENT
Start: 2018-08-24 | End: 2018-08-24 | Stop reason: HOSPADM

## 2018-08-24 RX ORDER — ONDANSETRON 4 MG/1
4 TABLET, ORALLY DISINTEGRATING ORAL EVERY 30 MIN PRN
Status: DISCONTINUED | OUTPATIENT
Start: 2018-08-24 | End: 2018-08-24 | Stop reason: HOSPADM

## 2018-08-24 RX ORDER — METOPROLOL TARTRATE 1 MG/ML
2.5 INJECTION, SOLUTION INTRAVENOUS ONCE
Status: COMPLETED | OUTPATIENT
Start: 2018-08-24 | End: 2018-08-24

## 2018-08-24 RX ORDER — DIMENHYDRINATE 50 MG/ML
25 INJECTION, SOLUTION INTRAMUSCULAR; INTRAVENOUS
Status: DISCONTINUED | OUTPATIENT
Start: 2018-08-24 | End: 2018-08-24 | Stop reason: HOSPADM

## 2018-08-24 RX ORDER — LEVOTHYROXINE SODIUM 50 UG/1
50 TABLET ORAL DAILY
Status: DISCONTINUED | OUTPATIENT
Start: 2018-08-25 | End: 2018-08-28 | Stop reason: HOSPADM

## 2018-08-24 RX ORDER — SODIUM CHLORIDE 9 MG/ML
INJECTION, SOLUTION INTRAVENOUS CONTINUOUS
Status: DISCONTINUED | OUTPATIENT
Start: 2018-08-24 | End: 2018-08-28 | Stop reason: HOSPADM

## 2018-08-24 RX ORDER — GLYCOPYRROLATE 0.2 MG/ML
INJECTION, SOLUTION INTRAMUSCULAR; INTRAVENOUS PRN
Status: DISCONTINUED | OUTPATIENT
Start: 2018-08-24 | End: 2018-08-24

## 2018-08-24 RX ORDER — ONDANSETRON 2 MG/ML
4 INJECTION INTRAMUSCULAR; INTRAVENOUS EVERY 30 MIN PRN
Status: DISCONTINUED | OUTPATIENT
Start: 2018-08-24 | End: 2018-08-24 | Stop reason: HOSPADM

## 2018-08-24 RX ORDER — ACETAMINOPHEN 325 MG/1
975 TABLET ORAL EVERY 8 HOURS
Status: DISPENSED | OUTPATIENT
Start: 2018-08-24 | End: 2018-08-27

## 2018-08-24 RX ORDER — HYDRALAZINE HYDROCHLORIDE 20 MG/ML
2.5-5 INJECTION INTRAMUSCULAR; INTRAVENOUS EVERY 10 MIN PRN
Status: DISCONTINUED | OUTPATIENT
Start: 2018-08-24 | End: 2018-08-24 | Stop reason: HOSPADM

## 2018-08-24 RX ORDER — NALOXONE HYDROCHLORIDE 0.4 MG/ML
.1-.4 INJECTION, SOLUTION INTRAMUSCULAR; INTRAVENOUS; SUBCUTANEOUS
Status: DISCONTINUED | OUTPATIENT
Start: 2018-08-24 | End: 2018-08-28 | Stop reason: HOSPADM

## 2018-08-24 RX ORDER — LABETALOL HYDROCHLORIDE 5 MG/ML
10 INJECTION, SOLUTION INTRAVENOUS
Status: DISCONTINUED | OUTPATIENT
Start: 2018-08-24 | End: 2018-08-24 | Stop reason: HOSPADM

## 2018-08-24 RX ORDER — ZOLPIDEM TARTRATE 5 MG/1
5 TABLET ORAL
Status: DISCONTINUED | OUTPATIENT
Start: 2018-08-25 | End: 2018-08-28 | Stop reason: HOSPADM

## 2018-08-24 RX ORDER — FENTANYL CITRATE 50 UG/ML
25-50 INJECTION, SOLUTION INTRAMUSCULAR; INTRAVENOUS
Status: DISCONTINUED | OUTPATIENT
Start: 2018-08-24 | End: 2018-08-24 | Stop reason: HOSPADM

## 2018-08-24 RX ORDER — CITALOPRAM HYDROBROMIDE 10 MG/1
10 TABLET ORAL DAILY
Status: DISCONTINUED | OUTPATIENT
Start: 2018-08-25 | End: 2018-08-28 | Stop reason: HOSPADM

## 2018-08-24 RX ORDER — KETAMINE HYDROCHLORIDE 10 MG/ML
INJECTION, SOLUTION INTRAMUSCULAR; INTRAVENOUS PRN
Status: DISCONTINUED | OUTPATIENT
Start: 2018-08-24 | End: 2018-08-24

## 2018-08-24 RX ORDER — LIDOCAINE 40 MG/G
CREAM TOPICAL
Status: DISCONTINUED | OUTPATIENT
Start: 2018-08-24 | End: 2018-08-28 | Stop reason: HOSPADM

## 2018-08-24 RX ADMIN — PHENYLEPHRINE HYDROCHLORIDE 100 MCG: 10 INJECTION, SOLUTION INTRAMUSCULAR; INTRAVENOUS; SUBCUTANEOUS at 14:12

## 2018-08-24 RX ADMIN — TRAMADOL HYDROCHLORIDE 50 MG: 50 TABLET, COATED ORAL at 22:15

## 2018-08-24 RX ADMIN — ONDANSETRON 4 MG: 2 INJECTION INTRAMUSCULAR; INTRAVENOUS at 13:18

## 2018-08-24 RX ADMIN — Medication 12.5 MG: at 19:53

## 2018-08-24 RX ADMIN — GLYCOPYRROLATE 0.2 MG: 0.2 INJECTION, SOLUTION INTRAMUSCULAR; INTRAVENOUS at 13:18

## 2018-08-24 RX ADMIN — ROCURONIUM BROMIDE 40 MG: 10 INJECTION INTRAVENOUS at 13:43

## 2018-08-24 RX ADMIN — METOPROLOL TARTRATE 1 MG: 5 INJECTION, SOLUTION INTRAVENOUS at 13:49

## 2018-08-24 RX ADMIN — Medication 0.2 MG: at 11:29

## 2018-08-24 RX ADMIN — Medication 0.3 MG: at 17:44

## 2018-08-24 RX ADMIN — Medication 0.3 MG: at 20:44

## 2018-08-24 RX ADMIN — CEFAZOLIN SODIUM 2 G: 2 INJECTION, SOLUTION INTRAVENOUS at 13:26

## 2018-08-24 RX ADMIN — Medication 0.2 MG: at 09:16

## 2018-08-24 RX ADMIN — Medication 2 MG: at 14:42

## 2018-08-24 RX ADMIN — CEFTRIAXONE SODIUM 1 G: 1 INJECTION, POWDER, FOR SOLUTION INTRAMUSCULAR; INTRAVENOUS at 22:00

## 2018-08-24 RX ADMIN — LIDOCAINE HYDROCHLORIDE 10 MEQ: 10 INJECTION, SOLUTION EPIDURAL; INFILTRATION; INTRACAUDAL; PERINEURAL at 11:35

## 2018-08-24 RX ADMIN — PHENYLEPHRINE HYDROCHLORIDE 100 MCG: 10 INJECTION, SOLUTION INTRAMUSCULAR; INTRAVENOUS; SUBCUTANEOUS at 14:05

## 2018-08-24 RX ADMIN — Medication 80 MG: at 13:18

## 2018-08-24 RX ADMIN — DEXAMETHASONE SODIUM PHOSPHATE 4 MG: 4 INJECTION, SOLUTION INTRA-ARTICULAR; INTRALESIONAL; INTRAMUSCULAR; INTRAVENOUS; SOFT TISSUE at 13:18

## 2018-08-24 RX ADMIN — Medication 1 G: at 13:27

## 2018-08-24 RX ADMIN — PROPOFOL 80 MG: 10 INJECTION, EMULSION INTRAVENOUS at 13:18

## 2018-08-24 RX ADMIN — KETAMINE HYDROCHLORIDE 50 MG: 10 INJECTION, SOLUTION INTRAMUSCULAR; INTRAVENOUS at 13:50

## 2018-08-24 RX ADMIN — PHENYLEPHRINE HYDROCHLORIDE 200 MCG: 10 INJECTION, SOLUTION INTRAMUSCULAR; INTRAVENOUS; SUBCUTANEOUS at 13:31

## 2018-08-24 RX ADMIN — SODIUM CHLORIDE, POTASSIUM CHLORIDE, SODIUM LACTATE AND CALCIUM CHLORIDE: 600; 310; 30; 20 INJECTION, SOLUTION INTRAVENOUS at 12:19

## 2018-08-24 RX ADMIN — METOPROLOL TARTRATE 2.5 MG: 5 INJECTION, SOLUTION INTRAVENOUS at 09:20

## 2018-08-24 RX ADMIN — LIDOCAINE HYDROCHLORIDE 10 MEQ: 10 INJECTION, SOLUTION EPIDURAL; INFILTRATION; INTRACAUDAL; PERINEURAL at 12:46

## 2018-08-24 RX ADMIN — FENTANYL CITRATE 50 MCG: 50 INJECTION, SOLUTION INTRAMUSCULAR; INTRAVENOUS at 13:44

## 2018-08-24 RX ADMIN — GLYCOPYRROLATE 0.3 MG: 0.2 INJECTION, SOLUTION INTRAMUSCULAR; INTRAVENOUS at 14:42

## 2018-08-24 RX ADMIN — Medication 0.2 MG: at 03:35

## 2018-08-24 RX ADMIN — SODIUM CHLORIDE: 9 INJECTION, SOLUTION INTRAVENOUS at 12:25

## 2018-08-24 RX ADMIN — LIDOCAINE HYDROCHLORIDE 50 MG: 10 INJECTION, SOLUTION INFILTRATION; PERINEURAL at 13:18

## 2018-08-24 RX ADMIN — PHENYLEPHRINE HYDROCHLORIDE 100 MCG: 10 INJECTION, SOLUTION INTRAMUSCULAR; INTRAVENOUS; SUBCUTANEOUS at 14:41

## 2018-08-24 RX ADMIN — SODIUM CHLORIDE: 9 INJECTION, SOLUTION INTRAVENOUS at 19:53

## 2018-08-24 RX ADMIN — PHENYLEPHRINE HYDROCHLORIDE 100 MCG: 10 INJECTION, SOLUTION INTRAMUSCULAR; INTRAVENOUS; SUBCUTANEOUS at 13:27

## 2018-08-24 RX ADMIN — SODIUM CHLORIDE: 9 INJECTION, SOLUTION INTRAVENOUS at 11:08

## 2018-08-24 RX ADMIN — PHENYLEPHRINE HYDROCHLORIDE 100 MCG: 10 INJECTION, SOLUTION INTRAMUSCULAR; INTRAVENOUS; SUBCUTANEOUS at 14:22

## 2018-08-24 RX ADMIN — ONDANSETRON 4 MG: 2 INJECTION INTRAMUSCULAR; INTRAVENOUS at 17:44

## 2018-08-24 RX ADMIN — METOPROLOL TARTRATE 2.5 MG: 5 INJECTION, SOLUTION INTRAVENOUS at 11:50

## 2018-08-24 RX ADMIN — SENNOSIDES AND DOCUSATE SODIUM 1 TABLET: 8.6; 5 TABLET ORAL at 19:54

## 2018-08-24 RX ADMIN — RANITIDINE 150 MG: 150 TABLET ORAL at 19:53

## 2018-08-24 RX ADMIN — FENTANYL CITRATE 100 MCG: 50 INJECTION, SOLUTION INTRAMUSCULAR; INTRAVENOUS at 13:08

## 2018-08-24 RX ADMIN — CEFAZOLIN SODIUM 1 G: 1 INJECTION, SOLUTION INTRAVENOUS at 20:44

## 2018-08-24 ASSESSMENT — ENCOUNTER SYMPTOMS
STRIDOR: 0
DYSRHYTHMIAS: 1

## 2018-08-24 ASSESSMENT — ACTIVITIES OF DAILY LIVING (ADL)
ADLS_ACUITY_SCORE: 27

## 2018-08-24 ASSESSMENT — LIFESTYLE VARIABLES: TOBACCO_USE: 0

## 2018-08-24 ASSESSMENT — COPD QUESTIONNAIRES: COPD: 0

## 2018-08-24 NOTE — PROGRESS NOTES
Preop report given to Quirino Gan RN. Pt transferred to Preop area at 1200 via bed by OR transport aid.

## 2018-08-24 NOTE — PROGRESS NOTES
[facility]    Hospitalist Progress Note    Date of Service (when I saw the patient): 08/24/2018    Assessment & Plan      88 year old female with PMH including CVA, hypertension, severe dementia, anxiety who presents with a fall.  She resides at [facility] and had a fall on hardwood floor in the dining area.  She was brought to the ER by medics and here in the ER was noted to be wincing upon examination of her right hip.     Here in the ER basic lab workup was unremarkable.  Imaging was notable for a moderately displaced transverse fracture through the upper neck of the proximal right femur with cephalad displacement of the distal fragment.  EKG here in the emergency room did show atrial fibrillation with rate in the low 100s which is reportedly a new diagnosis.  Orthopedic surgery was contacted and patient was admitted here for further care.      Assessment and Plan:   1. Right femoral neck fracture:   --admit to inpatient status  -- consult orthopedics, plan for surgery today   -- ECHO reviews, shows normal LV and preserved EF   -- scheduled tylenol  -- prn ultram and hydroxyzine, low dose dilaudid if needed, minimize narcotics otherwise  -- bowel regimen.     2.   History of prior hemorrhagic stroke and severe dementia: She is unable to provide any meaningful history other than some yes or no questions. At this point we will hold her aspirin and would keep delirium precautions as she is at some increased risk while hospitalized with an acute medical problem.  She reportedly resides in memory care.     3.   Hypertension: start metoprolol. Prn IV metoprolol for rates >110.     4.   Atrial fibrillation, possible new diagnosis: Discussed with family at bedside, they are not sure if this is entirely new and might have been present before, talked about risk of stroke and anti-coagulation, would not start now anyway with upcoming surgery, and not a good candidate due to fall risk and hx of  hemorrhagic stroke, family in agreement.   -- normal TSH.    -- Continue metoprolol low dose 12.5 mg BID , was npo for surgery today, given 5 mg of IV metoprolol      5.  age indeterminate moderate anterior wedge compression deformity of the L1 vertebral body: denies back pain at this time.  Monitor.      6. S/p Pacemaker placement.     7. Possible UTI. On IV ceftriaxone      DVT Prophylaxis: Pneumatic Compression Devices    Code Status: DNR/DNI    Disposition: Expected discharge = pending surgery     Updated son at bedside    [facility]    Interval History   Chart reviewed and patient seen. Case discussed with nursing staff.     pleasantly confused, reports some pain, Denies any chest pain, shortness of breath, No other complaints voiced.     -Data reviewed today: I reviewed all new labs and imaging results over the last 24 hours. I personally reviewed     Physical Exam   Temp: 97.1  F (36.2  C) Temp src: Temporal BP: (!) 126/94   Heart Rate: 95 Resp: 18 SpO2: 94 % O2 Device: None (Room air)    Vitals:    08/23/18 0035   Weight: 49.3 kg (108 lb 9.6 oz)     Vital Signs with Ranges  Temp:  [95.3  F (35.2  C)-97.2  F (36.2  C)] 97.1  F (36.2  C)  Heart Rate:  [72-95] 95  Resp:  [16-20] 18  BP: (126-164)/(51-94) 126/94  SpO2:  [94 %-97 %] 94 %  I/O last 3 completed shifts:  In: 2476 [P.O.:300; I.V.:2176]  Out: 750 [Urine:750]    GENERAL:  Awake.  No acute distress.  PSYCH: confused, dementia, speaks very little , no acute agitation   HEENT:  Neck is Supple, trachea is midline, conjunctiva clear  CARDIOVASCULAR: Irregular rate and rhythm, Normal S1, S2, no loud murmurs  PULMONARY:  Clear to auscultation bilaterally   GI: Abdomen is soft, non tender, non-distended, bowel sounds present. No rebound or guarding   SKIN:  No cyanosis or clubbing, no obvious exanthems on exposed areas   MSK: Extremities are warm and well perfused. No pitting edema   Neuro: Awake , confused, pleasant    Medications     lactated ringers        sodium chloride 75 mL/hr at 08/24/18 1108       [Auto Hold] acetaminophen (TYLENOL) tablet 1,000 mg  1,000 mg Oral BID     ceFAZolin  1 g Intravenous See Admin Instructions     ceFAZolin  2 g Intravenous Pre-Op/Pre-procedure x 1 dose     [Auto Hold] cefTRIAXone  1 g Intravenous Q24H     [Auto Hold] citalopram  10 mg Oral Daily     [Auto Hold] HYDROmorphone  0.5 mg Intravenous Once     [Auto Hold] hydrOXYzine  10 mg Oral Daily     [Auto Hold] levothyroxine  50 mcg Oral Daily     [Auto Hold] metoprolol tartrate  12.5 mg Oral BID     [Auto Hold] senna-docusate  1 tablet Oral BID    Or     [Auto Hold] senna-docusate  2 tablet Oral BID     sodium chloride (PF)  3 mL Intracatheter Q8H     Tranexamic Acid  1 g Intravenous Once       Data   All new lab data and imaging results from today have been reviewed       Recent Labs  Lab 08/24/18  0807 08/23/18  0945 08/22/18  2148 08/22/18  2031   WBC  --   --   --  6.6   HGB  --   --   --  13.8   MCV  --   --   --  99   PLT  --   --   --  259   NA  --   --   --  138   POTASSIUM 3.5 4.1 3.9 Canceled, Test credited   CHLORIDE  --   --   --  108   CO2  --   --   --  27   BUN  --   --   --  27   CR  --   --   --  0.59   ANIONGAP  --   --   --  3   BOLA  --   --   --  8.4*   GLC  --   --   --  93   TROPI  --   --   --  <0.015       No results found for this or any previous visit (from the past 24 hour(s)).

## 2018-08-24 NOTE — ANESTHESIA POSTPROCEDURE EVALUATION
Patient: April Donnelly    Procedure(s):  right hip BIPOLAR hemiarthroplasty  - Wound Class: I-Clean    Diagnosis:fracture  Diagnosis Additional Information: fracture      Anesthesia Type:  General, ETT    Note:  Anesthesia Post Evaluation    Patient location during evaluation: PACU  Patient participation: Unable to participate in evaluation secondary to underlying medical condition  Level of consciousness: sleepy but conscious  Pain management: adequate  Airway patency: patent  Cardiovascular status: acceptable  Respiratory status: acceptable  Hydration status: acceptable  PONV: controlled     Anesthetic complications: None          Last vitals:  Vitals:    08/24/18 1535 08/24/18 1540 08/24/18 1545   BP: 102/59 121/82 121/80   Pulse:      Resp: 15 17 15   Temp:      SpO2: 99% 97% 96%         Electronically Signed By: Stone Sherwood MD  August 24, 2018  3:49 PM

## 2018-08-24 NOTE — OR NURSING
Attempted to perform nasal antiseptic swabs but was not able to do this because patient refused and was not wanting me to swab in her nose.

## 2018-08-24 NOTE — OR NURSING
Tylenol 1,000 mg not given preop because patient is unable to swallow pills unless when they are crushed and with applesauce.  Cancelled order per protocol.

## 2018-08-24 NOTE — PLAN OF CARE
Problem: Patient Care Overview  Goal: Plan of Care/Patient Progress Review  Disoriented x4, calm. VSS;stable, on bedrest,repositioned. Remote Tele on. Scratching and shows agitation when in pain, IV dilauded given with relief. Glynn cath patent and putting out adequate amount.Surgery scheduled today.

## 2018-08-24 NOTE — ANESTHESIA CARE TRANSFER NOTE
Patient: April Donnelly    Procedure(s):  right hip BIPOLAR hemiarthroplasty  - Wound Class: I-Clean    Diagnosis: fracture  Diagnosis Additional Information: No value filed.    Anesthesia Type:   General, ETT     Note:  Airway :ETT  Patient transferred to:PACU  Comments: Pt spon resp, ETT in place, suctioned orally. Tx to PACU, monitors on, VSS, pt appears comfortable. Report to RN.Handoff Report: Identifed the Patient, Identified the Reponsible Provider, Reviewed the pertinent medical history, Discussed the surgical course, Reviewed Intra-OP anesthesia mangement and issues during anesthesia, Set expectations for post-procedure period and Allowed opportunity for questions and acknowledgement of understanding      Vitals: (Last set prior to Anesthesia Care Transfer)    CRNA VITALS  8/24/2018 1424 - 8/24/2018 1500      8/24/2018             NIBP: 137/76    NIBP Mean: 98                Electronically Signed By: HIGINIO James CRNA  August 24, 2018  3:00 PM

## 2018-08-24 NOTE — PLAN OF CARE
Problem: Patient Care Overview  Goal: Plan of Care/Patient Progress Review  Outcome: No Change  Pt. on bedrest awaiting surgery tomorrow at 1:10 PM. Unable to communicate d/t history of stroke and dementia. Bryan patent. IV infusing. Antibiotics for UTI. Pain managed with PRN IV dilaudid, atarax, and tylenol. Dilaudid 0.2 mg most effective in decreasing restlessness, which appears to be patient's way of showing pain. On telemetry. Patient begins to look around, pick at bryan, blankets etc. when uncomfortable, but did not try to get out of bed etc. Family comforting.

## 2018-08-24 NOTE — ANESTHESIA PREPROCEDURE EVALUATION
PAC NOTE:       ANESTHESIA PRE EVALUATION:  Anesthesia Evaluation     . Pt has had prior anesthetic. Type: General    No history of anesthetic complications          ROS/MED HX    ENT/Pulmonary:     (+)KAYLEIGH risk factors hypertension, , . .   (-) tobacco use, asthma, COPD and recent URI   Neurologic:     (+)dementia (severe dementia- not verbal), CVA     Cardiovascular:     (+) hypertension----. : . . fainting (syncope). pacemaker :. dysrhythmias PVCs and a-fib, valvular problems/murmurs mild TR:. Previous cardiac testing Echodate:8/18results:1. The left ventricle is normal in size. The visual ejection fraction is  estimated at 55%.  2. The right ventricle is normal size. Mildly decreased right ventricular  systolic function  3. There is mild to moderate (1-2+) tricuspid regurgitation. The right  ventricular systolic pressure is approximated at 30mmHg plus the right atrial  pressure.date: results:ECG reviewed date:8/18 results:A-fib rate 103.  Septal Qs.  PVCs. date: results:         (-) CAD   METS/Exercise Tolerance:     Hematologic:  - neg hematologic  ROS       Musculoskeletal:  - neg musculoskeletal ROS       GI/Hepatic:  - neg GI/hepatic ROS      (-) GERD, hepatitis and liver disease   Renal/Genitourinary:  - ROS Renal section negative       Endo:     (+) thyroid problem hypothyroidism, .   (-) Type I DM, Type II DM, other endocrine disorder and obesity   Psychiatric:     (+) psychiatric history anxiety      Infectious Disease:  - neg infectious disease ROS       Malignancy:      - no malignancy   Other:    - neg other ROS                 Physical Exam  Normal systems: cardiovascular, pulmonary and dental    Airway   Mallampati: III  TM distance: >3 FB  Neck ROM: full    Dental     Cardiovascular   Rhythm and rate: regular and normal  (-) no friction rub, no systolic click and no murmur    Pulmonary    breath sounds clear to auscultation(-) no rhonchi, no decreased breath sounds, no wheezes, no rales and no  stridor             Anesthesia Plan      History & Physical Review  History and physical reviewed and following examination; no interval change.    ASA Status:  3 .    NPO Status:  > 8 hours    Plan for General and ETT with Intravenous induction. Maintenance will be Balanced.    PONV prophylaxis:  Ondansetron (or other 5HT-3) and Dexamethasone or Solumedrol  Discussed DNR/DNI status with family.  They do not want chest compressions or cardiac defib, o/w drug resuscitation is good.      Postoperative Care  Postoperative pain management:  IV analgesics.      Consents  Anesthetic plan, risks, benefits and alternatives discussed with:  Patient or representative (daughter-in law)..                            .

## 2018-08-25 LAB
GLUCOSE SERPL-MCNC: 111 MG/DL (ref 70–99)
HGB BLD-MCNC: 11 G/DL (ref 11.7–15.7)

## 2018-08-25 PROCEDURE — 25000132 ZZH RX MED GY IP 250 OP 250 PS 637: Mod: GY | Performed by: HOSPITALIST

## 2018-08-25 PROCEDURE — 25000132 ZZH RX MED GY IP 250 OP 250 PS 637: Mod: GY | Performed by: INTERNAL MEDICINE

## 2018-08-25 PROCEDURE — A9270 NON-COVERED ITEM OR SERVICE: HCPCS | Mod: GY | Performed by: HOSPITALIST

## 2018-08-25 PROCEDURE — 12000007 ZZH R&B INTERMEDIATE

## 2018-08-25 PROCEDURE — 36415 COLL VENOUS BLD VENIPUNCTURE: CPT | Performed by: ORTHOPAEDIC SURGERY

## 2018-08-25 PROCEDURE — 97530 THERAPEUTIC ACTIVITIES: CPT | Mod: GP

## 2018-08-25 PROCEDURE — 40000193 ZZH STATISTIC PT WARD VISIT

## 2018-08-25 PROCEDURE — 82947 ASSAY GLUCOSE BLOOD QUANT: CPT | Performed by: ORTHOPAEDIC SURGERY

## 2018-08-25 PROCEDURE — 99232 SBSQ HOSP IP/OBS MODERATE 35: CPT | Performed by: INTERNAL MEDICINE

## 2018-08-25 PROCEDURE — A9270 NON-COVERED ITEM OR SERVICE: HCPCS | Mod: GY | Performed by: INTERNAL MEDICINE

## 2018-08-25 PROCEDURE — 97161 PT EVAL LOW COMPLEX 20 MIN: CPT | Mod: GP

## 2018-08-25 PROCEDURE — A9270 NON-COVERED ITEM OR SERVICE: HCPCS | Mod: GY | Performed by: ORTHOPAEDIC SURGERY

## 2018-08-25 PROCEDURE — 25000128 H RX IP 250 OP 636: Performed by: ORTHOPAEDIC SURGERY

## 2018-08-25 PROCEDURE — 25000132 ZZH RX MED GY IP 250 OP 250 PS 637: Mod: GY | Performed by: ORTHOPAEDIC SURGERY

## 2018-08-25 PROCEDURE — 85018 HEMOGLOBIN: CPT | Performed by: ORTHOPAEDIC SURGERY

## 2018-08-25 RX ORDER — CEFUROXIME AXETIL 250 MG/1
500 TABLET ORAL EVERY 12 HOURS SCHEDULED
Status: DISCONTINUED | OUTPATIENT
Start: 2018-08-25 | End: 2018-08-28 | Stop reason: HOSPADM

## 2018-08-25 RX ADMIN — ONDANSETRON 4 MG: 2 INJECTION INTRAMUSCULAR; INTRAVENOUS at 01:01

## 2018-08-25 RX ADMIN — OXYCODONE HYDROCHLORIDE 5 MG: 5 TABLET ORAL at 00:08

## 2018-08-25 RX ADMIN — Medication 0.5 MG: at 20:50

## 2018-08-25 RX ADMIN — LEVOTHYROXINE SODIUM 50 MCG: 50 TABLET ORAL at 08:52

## 2018-08-25 RX ADMIN — ONDANSETRON 4 MG: 2 INJECTION INTRAMUSCULAR; INTRAVENOUS at 06:28

## 2018-08-25 RX ADMIN — SODIUM CHLORIDE: 9 INJECTION, SOLUTION INTRAVENOUS at 06:28

## 2018-08-25 RX ADMIN — RANITIDINE 150 MG: 150 TABLET ORAL at 20:46

## 2018-08-25 RX ADMIN — HYDROXYZINE HYDROCHLORIDE 25 MG: 25 TABLET ORAL at 15:24

## 2018-08-25 RX ADMIN — ACETAMINOPHEN 975 MG: 325 TABLET, FILM COATED ORAL at 18:25

## 2018-08-25 RX ADMIN — CITALOPRAM HYDROBROMIDE 10 MG: 10 TABLET ORAL at 08:52

## 2018-08-25 RX ADMIN — Medication 12.5 MG: at 08:52

## 2018-08-25 RX ADMIN — Medication 12.5 MG: at 20:46

## 2018-08-25 RX ADMIN — CEFUROXIME AXETIL 500 MG: 250 TABLET, FILM COATED ORAL at 20:46

## 2018-08-25 RX ADMIN — RANITIDINE 150 MG: 150 TABLET ORAL at 08:52

## 2018-08-25 RX ADMIN — Medication 0.5 MG: at 01:51

## 2018-08-25 RX ADMIN — OXYCODONE HYDROCHLORIDE 5 MG: 5 TABLET ORAL at 13:01

## 2018-08-25 RX ADMIN — ENOXAPARIN SODIUM 40 MG: 40 INJECTION SUBCUTANEOUS at 09:01

## 2018-08-25 RX ADMIN — OXYCODONE HYDROCHLORIDE 5 MG: 5 TABLET ORAL at 18:25

## 2018-08-25 RX ADMIN — OXYCODONE HYDROCHLORIDE 5 MG: 5 TABLET ORAL at 08:52

## 2018-08-25 RX ADMIN — CEFAZOLIN SODIUM 1 G: 1 INJECTION, SOLUTION INTRAVENOUS at 04:48

## 2018-08-25 RX ADMIN — ACETAMINOPHEN 975 MG: 325 TABLET, FILM COATED ORAL at 08:51

## 2018-08-25 ASSESSMENT — ACTIVITIES OF DAILY LIVING (ADL)
ADLS_ACUITY_SCORE: 29
ADLS_ACUITY_SCORE: 29
ADLS_ACUITY_SCORE: 30
SWALLOWING: 2-->DIFFICULTY SWALLOWING LIQUIDS
WHICH_OF_THE_ABOVE_FUNCTIONAL_RISKS_HAD_A_RECENT_ONSET_OR_CHANGE?: AMBULATION;TRANSFERRING
ADLS_ACUITY_SCORE: 27
ADLS_ACUITY_SCORE: 29
ADLS_ACUITY_SCORE: 27
RETIRED_EATING: 2-->ASSISTIVE PERSON

## 2018-08-25 NOTE — PROGRESS NOTES
Orthopedic Surgery  8/25/2018    S: Patient voices no complaints today.     O: Blood pressure 116/51, pulse 96, temperature 97.2  F (36.2  C), temperature source Axillary, resp. rate 18, weight 49.3 kg (108 lb 9.6 oz), SpO2 96 %, not currently breastfeeding.  Lab Results   Component Value Date    HGB 11.0 08/25/2018     No results found for: INR     Neurovascularly intact.  Calves are negative bilaterally, both soft and nontender.  The wound is C/D/I.  The wound looks good with minimal erythema of the surrounding skin.    A: Ms. Donnelly is doing well status post Procedure(s):  OPEN REDUCTION INTERNAL FIXATION HIP BIPOLAR.    P: Continue physical therapy.   Anticoagulation   Pain management  Discharge planning    [facility]  [phone]

## 2018-08-25 NOTE — PLAN OF CARE
Problem: Patient Care Overview  Goal: Plan of Care/Patient Progress Review  Outcome: Improving  Pt. up from PACU at approximately 1630. Sleepy most of shift, but wakes up and begins to pick at blankets and lines. IV dilaudid given using FLACC scale. PO meds not well received, and unable to complete due to disorientation and anesthesia. Able to reposition. Glynn patent. IV infusing. Capnography unable to read accurately. Per PACU her jaw position makes this difficult, and therefore is not in use. Vitals with continuous pulse ox, WNL. On 2L/NC. Telemetry, a-fib with HR that varies between 120-78 in a given minute. Resting comfortably with wedge in place, PCD's on.

## 2018-08-25 NOTE — PLAN OF CARE
Problem: Patient Care Overview  Goal: Plan of Care/Patient Progress Review  Discharge Planner PT    88 year old female with PMH including CVA, hypertension, severe dementia, anxiety who presents with a fall.  She resides at [facility] and had a fall on hardwood floor in the dining area. Pt underwent a R ORIF.  Pt is now weight bearing as tolerated. Pt was mostly W/C bound prior to hospital stay per adult son's report, but pt was able to transfer with assist.  Patient plan for discharge: unable to state  Current status:  pt was pleasantly confused throughout tx session and pt's pain levels appear to be well managed. pt was unable to follow commands and pt required mod-max A of 2 in order to transfer pt from supine to sitting to EOB. pt was able to dangle at EOB for 10 minutes but pt was unable to maintain upright posture on her own. Pt did not appear distressed or fearful while transferring.  Recommend nursing staff to attempt to transfer pt into recliner with lift.  Barriers to return to prior living situation: none for TCU  Recommendations for discharge: TCU  Rationale for recommendations: pt is below baseline and pt is unable to care for one needs       Entered by: Javier Heck 08/25/2018 3:19 PM

## 2018-08-25 NOTE — PROGRESS NOTES
[facility]    Hospitalist Progress Note    Date of Service (when I saw the patient): 08/25/2018    Assessment & Plan      88 year old female with PMH including CVA, hypertension, severe dementia, anxiety who presents with a fall.  She resides at [facility] and had a fall on hardwood floor in the dining area.  She was brought to the ER by medics and here in the ER was noted to be wincing upon examination of her right hip.     Here in the ER basic lab workup was unremarkable.  Imaging was notable for a moderately displaced transverse fracture through the upper neck of the proximal right femur with cephalad displacement of the distal fragment.  EKG here in the emergency room did show atrial fibrillation with rate in the low 100s which is reportedly a new diagnosis.  Orthopedic surgery was contacted and patient was admitted here for further care.      Assessment and Plan:   1. Right femoral neck fracture: s/p Procedure(s):  OPEN REDUCTION INTERNAL FIXATION HIP BIPOLAR  -- Defer post-op cares including activity, wound care and DVT Prophylaxis to surgery      2.   History of prior hemorrhagic stroke and severe dementia: She is unable to provide any meaningful history other than some yes or no questions. At this point we will hold her aspirin and would keep delirium precautions as she is at some increased risk while hospitalized with an acute medical problem.  She reportedly resides in memory care.     3.   Hypertension: start metoprolol. Prn IV metoprolol for rates >110.     4.   Atrial fibrillation, possible new diagnosis: Discussed with family at bedside, they are not sure if this is entirely new and might have been present before, talked about risk of stroke and anti-coagulation, would not start now anyway with recent surgery, and not a good candidate due to fall risk and hx of hemorrhagic stroke as well , family in agreement.   -- normal TSH.    -- ECHO reviewd, shows normal LV and  preserved EF   -- Continue metoprolol low dose 12.5 mg BID , monitor HR      5.  age indeterminate moderate anterior wedge compression deformity of the L1 vertebral body:     6. S/p Pacemaker placement.     7. UTI. On IV ceftriaxone , change to PO ceftin based on cultures      DVT Prophylaxis: Pneumatic Compression Devices, lovenox     Code Status: DNR/DNI    Disposition: Expected discharge = 3 days to TCU     Updated son at bedside    Jean Woodsman    Interval History   Chart reviewed and patient seen. Case discussed with nursing staff.     No major issues overnight after surgery, resting in bed, confused, denies pain     -Data reviewed today: I reviewed all new labs and imaging results over the last 24 hours. I personally reviewed     Physical Exam   Temp: 97.2  F (36.2  C) Temp src: Axillary BP: 116/51 Pulse: 96 Heart Rate: 83 Resp: 18 SpO2: 96 % O2 Device: None (Room air) Oxygen Delivery: 1 LPM  Vitals:    08/23/18 0035   Weight: 49.3 kg (108 lb 9.6 oz)     Vital Signs with Ranges  Temp:  [96.3  F (35.7  C)-98.3  F (36.8  C)] 97.2  F (36.2  C)  Pulse:  [96] 96  Heart Rate:  [] 83  Resp:  [11-20] 18  BP: (102-157)/(50-86) 116/51  FiO2 (%):  [100 %] 100 %  SpO2:  [94 %-100 %] 96 %  I/O last 3 completed shifts:  In: 2982 [I.V.:2982]  Out: 875 [Urine:800; Blood:75]    GENERAL:  Awake.  No acute distress.  PSYCH: confused, dementia, speaks very little , no acute agitation   HEENT:  Neck is Supple, trachea is midline, conjunctiva clear  CARDIOVASCULAR: Irregular rate and rhythm, Normal S1, S2, no loud murmurs  PULMONARY:  Clear to auscultation bilaterally   GI: Abdomen is soft, non tender, non-distended, bowel sounds present. No rebound or guarding   SKIN:  No cyanosis or clubbing, no obvious exanthems on exposed areas   MSK: Extremities are warm and well perfused. No pitting edema , dressing on right thigh   Neuro: Awake , confused, pleasant    Medications     sodium chloride 100 mL/hr at 08/25/18 0908        acetaminophen  975 mg Oral Q8H     cefuroxime  500 mg Oral Q12H MCKENNA     citalopram  10 mg Oral Daily     enoxaparin  40 mg Subcutaneous Q24H     levothyroxine  50 mcg Oral Daily     metoprolol tartrate  12.5 mg Oral BID     ondansetron  4 mg Intravenous Q6H     ranitidine  150 mg Oral BID     sodium chloride (PF)  3 mL Intracatheter Q8H       Data   All new lab data and imaging results from today have been reviewed       Recent Labs  Lab 08/25/18  0756 08/24/18  1701 08/24/18  0807 08/23/18  0945  08/22/18  2031   WBC  --   --   --   --   --  6.6   HGB 11.0*  --   --   --   --  13.8   MCV  --   --   --   --   --  99   PLT  --  169  --   --   --  259   NA  --   --   --   --   --  138   POTASSIUM  --  4.4 3.5 4.1  < > Canceled, Test credited   CHLORIDE  --   --   --   --   --  108   CO2  --   --   --   --   --  27   BUN  --   --   --   --   --  27   CR  --  0.68  --   --   --  0.59   ANIONGAP  --   --   --   --   --  3   BOLA  --   --   --   --   --  8.4*   *  --   --   --   --  93   TROPI  --   --   --   --   --  <0.015   < > = values in this interval not displayed.    Recent Results (from the past 24 hour(s))   XR Pelvis w Hip Port Right 1 View    Narrative    XR PORTABLE PELVIS AND RIGHT HIP ONE VIEW   8/24/2018 3:48 PM     HISTORY: Postoperative.    COMPARISON: 8/22/2018      Impression    IMPRESSION: Interval placement of right Alexandru Lew prosthesis. The  prosthesis is in good position. No evidence for fracture or  dislocation on this exam. Mild degenerative changes seen in the left  hip and symphysis pubis.    MARITA DWYER MD

## 2018-08-25 NOTE — PLAN OF CARE
Problem: Patient Care Overview  Goal: Plan of Care/Patient Progress Review  Discharge Planner OT   Patient plan for discharge: None stated  Current status: Order received, chart reviewed. Per chart review and discussion with PT, pt resides in memory care unit, receives assist for ADLs/IADLs and transfers at baseline. Pt currently requiring Ax2 for bed mobility, unable to mobilize OOB due to impaired command following. PT recommending TCU at discharge, most appropriate to initiate OT services at TCU and/or [facility] unit. Will complete IP OT order.   Barriers to return to prior living situation: See PT note for barriers  Recommendations for discharge: TCU - defer OT eval to TCU and/or MC unit  Rationale for recommendations: Defer OT eval to next level of care.        Entered by: Patricia Chris 08/25/2018 3:49 PM

## 2018-08-25 NOTE — PROGRESS NOTES
08/25/18 1500   Quick Adds   Type of Visit Initial PT Evaluation   Living Environment   Lives With facility resident   Living Environment Comment pt resides in a memory care unit   Self-Care   Usual Activity Tolerance fair   Current Activity Tolerance poor   Regular Exercise no   Activity/Exercise/Self-Care Comment pt was mostly W/C bound prior to hosptial stay per Adult son   Functional Level Prior   Ambulation 3-->assistive equipment and person   Transferring 2-->assistive person   Toileting 2-->assistive person   Bathing 2-->assistive person   Fall history within last six months yes   Number of times patient has fallen within last six months 1   Which of the above functional risks had a recent onset or change? ambulation;transferring;fall history   General Information   Onset of Illness/Injury or Date of Surgery - Date 08/24/18   Referring Physician Dung Buck MD   Patient/Family Goals Statement back to [facility] care unit   Pertinent History of Current Problem (include personal factors and/or comorbidities that impact the POC) 88 year old female with PMH including CVA, hypertension, severe dementia, anxiety who presents with a fall.  She resides at [facility] and had a fall on hardwood floor in the dining area. pt sustained a right hip fracture and is now S/P ORIF   Weight-Bearing Status - RLE weight-bearing as tolerated   Cognitive Status Examination   Orientation not oriented to person, place or time   Level of Consciousness confused   Follows Commands and Answers Questions speech unintelligible;unable to follow commands   Personal Safety and Judgment impaired   Memory impaired   Pain Assessment   Patient Currently in Pain Yes, see Vital Sign flowsheet   Integumentary/Edema   Integumentary/Edema no deficits were identifed   Posture    Posture Not impaired   Range of Motion (ROM)   ROM Quick Adds No deficits were identified   Strength   Strength Comments unable to assess    Bed  "Mobility   Bed Mobility Comments pt required mod A of 2 in order to transfer pt from supine to sitting to EOB. pt is unable to follow commands and pt was pleasently confused throughout tx session. pt was able to dangle at EOB for 10 minutes but pt was unable to maintain upright posture on her own.   Balance   Balance no deficits were identified   Sensory Examination   Sensory Perception no deficits were identified   Coordination   Coordination no deficits were identified   General Therapy Interventions   Planned Therapy Interventions gait training;progressive activity/exercise;home program guidelines;risk factor education;transfer training;stretching;strengthening;ROM   Clinical Impression   Criteria for Skilled Therapeutic Intervention yes, treatment indicated   PT Diagnosis decreased mobility   Influenced by the following impairments dementia, pain    Functional limitations due to impairments ambulation, transfers, bed mobility   Clinical Presentation Stable/Uncomplicated   Clinical Presentation Rationale vitals stable   Clinical Decision Making (Complexity) Low complexity   Therapy Frequency` daily   Predicted Duration of Therapy Intervention (days/wks) 3 days   Anticipated Equipment Needs at Discharge wheelchair   Anticipated Discharge Disposition Transitional Care Facility   Risk & Benefits of therapy have been explained Yes   Patient, Family & other staff in agreement with plan of care Yes   [facility] [employer] \"6 Clicks\"   2016, Trustees of [facility], under license to [employer].  All rights reserved.   6 Clicks Short Forms Basic Mobility Inpatient Short Form   [facility]  \"6 Clicks\" V.2 Basic Mobility Inpatient Short Form   1. Turning from your back to your side while in a flat bed without using bedrails? 2 - A Lot   2. Moving from lying on your back to sitting on the side of a flat bed without using bedrails? 2 - A Lot   3. Moving to and from a bed to a chair (including a " wheelchair)? 2 - A Lot   4. Standing up from a chair using your arms (e.g., wheelchair, or bedside chair)? 1 - Total   5. To walk in hospital room? 1 - Total   6. Climbing 3-5 steps with a railing? 1 - Total   Basic Mobility Raw Score (Score out of 24.Lower scores equate to lower levels of function) 9   Total Evaluation Time   Total Evaluation Time (Minutes) 10

## 2018-08-25 NOTE — PLAN OF CARE
End of Shift Summary.  For vital signs and complete assessments, please see documentation flowsheets.     Pertinent assessments: pt is alert, mostly non verbal from a previous stroke. Pt turns well in bed with assist of 2. Abduction pillow removed, skin WDL except incision. Pt given IV dilaudid overnight, did try oral oxycodone with little relief of pt's restlessness and grimacing with turns. Adequate urine output via bryan. Pt has not been out of be yet, will leave bryan in this am.   Major Shift Events: none  Plan (Upcoming Events): continue current cares, PT/OT  Discharge/Transfer Needs: unclear, resides at [facility] memory unit, may need TCU before back to baseline    Bedside Shift Report Completed :   Bedside Safety Check Completed:

## 2018-08-25 NOTE — PLAN OF CARE
Problem: Patient Care Overview  Goal: Plan of Care/Patient Progress Review  Outcome: Improving  Day RN  VS monitored, remote tele, DNR/DNI, drsg C/D/I, 1+ dorsal pedals, CLAUDIA dorsi/plantar d/t cognitive status, disoriented x4, calm and cooperative, appears more agitated when in pain, speech garbled and illogical, Tylenol/Oxycodone/Atarax for pain, poor PO intake, encourage PO and fluids, IV Rocephin discontinued and changed to PO Ceftin, lift to recliner if pt gets OOB, bryan remains d/t low UOP and limited mobility, family updated at b/s, will cont to monitor.

## 2018-08-26 LAB
ANION GAP SERPL CALCULATED.3IONS-SCNC: 7 MMOL/L (ref 3–14)
BUN SERPL-MCNC: 25 MG/DL (ref 7–30)
CALCIUM SERPL-MCNC: 7.9 MG/DL (ref 8.5–10.1)
CHLORIDE SERPL-SCNC: 112 MMOL/L (ref 94–109)
CO2 SERPL-SCNC: 22 MMOL/L (ref 20–32)
CREAT SERPL-MCNC: 0.75 MG/DL (ref 0.52–1.04)
ERYTHROCYTE [DISTWIDTH] IN BLOOD BY AUTOMATED COUNT: 13.1 % (ref 10–15)
GFR SERPL CREATININE-BSD FRML MDRD: 73 ML/MIN/1.7M2
GLUCOSE SERPL-MCNC: 85 MG/DL (ref 70–99)
HCT VFR BLD AUTO: 31.2 % (ref 35–47)
HGB BLD-MCNC: 9.8 G/DL (ref 11.7–15.7)
MCH RBC QN AUTO: 32 PG (ref 26.5–33)
MCHC RBC AUTO-ENTMCNC: 31.4 G/DL (ref 31.5–36.5)
MCV RBC AUTO: 102 FL (ref 78–100)
PLATELET # BLD AUTO: 179 10E9/L (ref 150–450)
POTASSIUM SERPL-SCNC: 4.1 MMOL/L (ref 3.4–5.3)
RBC # BLD AUTO: 3.06 10E12/L (ref 3.8–5.2)
SODIUM SERPL-SCNC: 141 MMOL/L (ref 133–144)
WBC # BLD AUTO: 9.2 10E9/L (ref 4–11)

## 2018-08-26 PROCEDURE — 36415 COLL VENOUS BLD VENIPUNCTURE: CPT | Performed by: INTERNAL MEDICINE

## 2018-08-26 PROCEDURE — 25000132 ZZH RX MED GY IP 250 OP 250 PS 637: Mod: GY | Performed by: HOSPITALIST

## 2018-08-26 PROCEDURE — 25000132 ZZH RX MED GY IP 250 OP 250 PS 637: Mod: GY | Performed by: INTERNAL MEDICINE

## 2018-08-26 PROCEDURE — A9270 NON-COVERED ITEM OR SERVICE: HCPCS | Mod: GY | Performed by: ORTHOPAEDIC SURGERY

## 2018-08-26 PROCEDURE — A9270 NON-COVERED ITEM OR SERVICE: HCPCS | Mod: GY | Performed by: INTERNAL MEDICINE

## 2018-08-26 PROCEDURE — 25000128 H RX IP 250 OP 636: Performed by: ORTHOPAEDIC SURGERY

## 2018-08-26 PROCEDURE — 80048 BASIC METABOLIC PNL TOTAL CA: CPT | Performed by: INTERNAL MEDICINE

## 2018-08-26 PROCEDURE — 85027 COMPLETE CBC AUTOMATED: CPT | Performed by: INTERNAL MEDICINE

## 2018-08-26 PROCEDURE — A9270 NON-COVERED ITEM OR SERVICE: HCPCS | Mod: GY | Performed by: HOSPITALIST

## 2018-08-26 PROCEDURE — 12000007 ZZH R&B INTERMEDIATE

## 2018-08-26 PROCEDURE — 99232 SBSQ HOSP IP/OBS MODERATE 35: CPT | Performed by: INTERNAL MEDICINE

## 2018-08-26 PROCEDURE — 25000132 ZZH RX MED GY IP 250 OP 250 PS 637: Mod: GY | Performed by: ORTHOPAEDIC SURGERY

## 2018-08-26 RX ORDER — DIPHENHYDRAMINE HYDROCHLORIDE 50 MG/ML
25 INJECTION INTRAMUSCULAR; INTRAVENOUS EVERY 4 HOURS PRN
Status: DISCONTINUED | OUTPATIENT
Start: 2018-08-26 | End: 2018-08-28 | Stop reason: HOSPADM

## 2018-08-26 RX ORDER — POLYETHYLENE GLYCOL 3350 17 G/17G
17 POWDER, FOR SOLUTION ORAL 2 TIMES DAILY PRN
Status: DISCONTINUED | OUTPATIENT
Start: 2018-08-26 | End: 2018-08-28 | Stop reason: HOSPADM

## 2018-08-26 RX ORDER — SENNOSIDES 8.6 MG
1-2 TABLET ORAL 2 TIMES DAILY PRN
Status: DISCONTINUED | OUTPATIENT
Start: 2018-08-26 | End: 2018-08-28 | Stop reason: HOSPADM

## 2018-08-26 RX ORDER — AMMONIUM LACTATE 12 G/100G
LOTION TOPICAL 4 TIMES DAILY PRN
Status: DISCONTINUED | OUTPATIENT
Start: 2018-08-26 | End: 2018-08-28 | Stop reason: HOSPADM

## 2018-08-26 RX ORDER — HYDROMORPHONE HYDROCHLORIDE 2 MG/1
2 TABLET ORAL EVERY 4 HOURS PRN
Status: DISCONTINUED | OUTPATIENT
Start: 2018-08-26 | End: 2018-08-28 | Stop reason: HOSPADM

## 2018-08-26 RX ADMIN — ACETAMINOPHEN 975 MG: 325 TABLET, FILM COATED ORAL at 08:49

## 2018-08-26 RX ADMIN — LEVOTHYROXINE SODIUM 50 MCG: 50 TABLET ORAL at 08:22

## 2018-08-26 RX ADMIN — SENNOSIDES 1 TABLET: 8.6 TABLET, FILM COATED ORAL at 15:22

## 2018-08-26 RX ADMIN — Medication 0.5 MG: at 04:11

## 2018-08-26 RX ADMIN — CITALOPRAM HYDROBROMIDE 10 MG: 10 TABLET ORAL at 08:22

## 2018-08-26 RX ADMIN — Medication 12.5 MG: at 21:08

## 2018-08-26 RX ADMIN — CEFUROXIME AXETIL 500 MG: 250 TABLET, FILM COATED ORAL at 21:08

## 2018-08-26 RX ADMIN — ENOXAPARIN SODIUM 40 MG: 40 INJECTION SUBCUTANEOUS at 08:49

## 2018-08-26 RX ADMIN — Medication: at 15:14

## 2018-08-26 RX ADMIN — Medication 12.5 MG: at 08:22

## 2018-08-26 RX ADMIN — OXYCODONE HYDROCHLORIDE 5 MG: 5 TABLET ORAL at 12:55

## 2018-08-26 RX ADMIN — OXYCODONE HYDROCHLORIDE 5 MG: 5 TABLET ORAL at 08:22

## 2018-08-26 RX ADMIN — ACETAMINOPHEN 975 MG: 325 TABLET, FILM COATED ORAL at 18:45

## 2018-08-26 RX ADMIN — CEFUROXIME AXETIL 500 MG: 250 TABLET, FILM COATED ORAL at 08:22

## 2018-08-26 RX ADMIN — SODIUM CHLORIDE: 9 INJECTION, SOLUTION INTRAVENOUS at 04:01

## 2018-08-26 RX ADMIN — RANITIDINE 150 MG: 150 TABLET ORAL at 08:22

## 2018-08-26 RX ADMIN — ACETAMINOPHEN 975 MG: 325 TABLET, FILM COATED ORAL at 00:47

## 2018-08-26 RX ADMIN — HYDROXYZINE HYDROCHLORIDE 25 MG: 25 TABLET ORAL at 15:22

## 2018-08-26 ASSESSMENT — ACTIVITIES OF DAILY LIVING (ADL)
ADLS_ACUITY_SCORE: 29
ADLS_ACUITY_SCORE: 29
ADLS_ACUITY_SCORE: 27
ADLS_ACUITY_SCORE: 33
ADLS_ACUITY_SCORE: 27
ADLS_ACUITY_SCORE: 27

## 2018-08-26 NOTE — PROGRESS NOTES
"Orthopedic Surgery  8/26/2018    S: Patient voices no complaints today.     O: Blood pressure 120/64, pulse 83, temperature 97.7  F (36.5  C), temperature source Axillary, resp. rate 18, height 1.575 m (5' 2.01\"), weight 49.3 kg (108 lb 9.6 oz), SpO2 95 %, not currently breastfeeding.  Lab Results   Component Value Date    HGB 9.8 08/26/2018     No results found for: INR     Neurovascularly intact.  Calves are negative bilaterally, both soft and nontender.  The wound is C/D/I.  The wound looks good with minimal erythema of the surrounding skin.    A: Ms. Donnelly is doing well status post Procedure(s):  OPEN REDUCTION INTERNAL FIXATION HIP BIPOLAR.    P: Continue physical therapy.   Anticoagulation   Pain management  Discharge planning, to TCU when medically stable    Dung Buck  [phone]  "

## 2018-08-26 NOTE — PLAN OF CARE
Problem: Patient Care Overview  Goal: Plan of Care/Patient Progress Review  Outcome: No Change  [id number]    Vitals: VSS. Afebrile  Neuro: Disoriented x4, CMS intact. Agitated throughout night, pulling on bryan and IV site, pulled off aquacell  Lungs: LS clear, room air, no noted cough, unable to perform IS  Cardiac: Tele: A-fib.   IV: NS infusing at 100/hr  Labs: CBC/BMP/Hgb in AM  Skin: pt pulled off aquacell, replaced, CDI   GI: BS active, no bm, passing flatus  : bryan in place  Diet: Regular, total feed, takes meds in applesauce  Pain: agitation/ restlessness. PRN oxy and IV dilaudid.   Activity: Total assist/mech. lift  Plan: PT/Care/SW consulted. Spoke with MD this AM and believes can be d.c to TCU Monday.  Pain management

## 2018-08-26 NOTE — PLAN OF CARE
Problem: Patient Care Overview  Goal: Plan of Care/Patient Progress Review  Outcome: Improving  Day RN  VS monitored, DNR/DNI, up to recliner w/ceiling lift, drsg C/D/I, 1+ dorsal pedals, PO Ceftin cont, Tylenol/Oxycodone for pain, 100 ml OP of bryan, bryan discontinued at 1300, DTV, incontinence brief in place, pt becomes more agitated and pulling at things when she appears to be in pain, disoriented x4, garbled/illogical speech, poor PO intake, cont to encourage PO and fluids, BS+, flatus+, scattered rash/sratches from pt itching, cream ordered to help, plan is to discharge to TCU once medically stable, family updated at b/s, will cont to monitor.

## 2018-08-26 NOTE — PROGRESS NOTES
[facility]    Hospitalist Progress Note    Date of Service (when I saw the patient): 08/26/2018    Assessment & Plan      88 year old female with PMH including CVA, hypertension, severe dementia, anxiety who presents with a fall.  She resides at [facility] and had a fall on hardwood floor in the dining area.  She was brought to the ER by medics and here in the ER was noted to be wincing upon examination of her right hip.   Here in the ER basic lab workup was unremarkable.  Imaging was notable for a moderately displaced transverse fracture through the upper neck of the proximal right femur with cephalad displacement of the distal fragment.  EKG here in the emergency room did show atrial fibrillation with rate in the low 100s which is reportedly a new diagnosis.  Orthopedic surgery was contacted and patient was admitted here for further care.      Assessment and Plan:   1. Right femoral neck fracture: s/p Procedure(s):OPEN REDUCTION INTERNAL FIXATION HIP BIPOLAR  -- Defer post-op cares including activity, wound care and DVT Prophylaxis to surgery      2.   History of prior hemorrhagic stroke and severe dementia: She is unable to provide any meaningful history other than some yes or no questions. At this point we will hold her aspirin and would keep delirium precautions as she is at some increased risk while hospitalized with an acute medical problem.  She reportedly resides in memory care.     3.   Hypertension: start metoprolol.      4.   Atrial fibrillation, possible new diagnosis: Dr. Garcia discussed with family at bedside, they are not sure if this is entirely new and might have been present before, talked about risk of stroke and anti-coagulation, would not start now anyway with recent surgery, and not a good candidate due to fall risk and hx of hemorrhagic stroke as well , family in agreement.   -- normal TSH.    -- ECHO reviewd, shows normal LV and preserved EF   -- Continue  metoprolol low dose 12.5 mg BID , monitor HR      5.  age indeterminate moderate anterior wedge compression deformity of the L1 vertebral body:     6. S/p Pacemaker placement.     7. UTI. On IV ceftriaxone , change to PO ceftin based on cultures, day 5   DVT Prophylaxis: Pneumatic Compression Devices, lovenox     Code Status: DNR/DNI    Disposition: Tomorrow transitional care unit,  consulted      Kyle Micthell    Interval History     Discussed with the patient nurse  Patient Pleasant denies complaint little sleepy she received pain medication    Physical Exam   Temp: 97.7  F (36.5  C) Temp src: Axillary BP: 120/64 Pulse: 83 Heart Rate: 97 Resp: 12 SpO2: 95 % O2 Device: None (Room air)    Vitals:    08/23/18 0035   Weight: 49.3 kg (108 lb 9.6 oz)     Vital Signs with Ranges  Temp:  [97.3  F (36.3  C)-98.2  F (36.8  C)] 97.7  F (36.5  C)  Pulse:  [83] 83  Heart Rate:  [97-98] 97  Resp:  [12-18] 12  BP: (107-120)/(61-64) 120/64  SpO2:  [94 %-97 %] 95 %  I/O last 3 completed shifts:  In: 397 [P.O.:397]  Out: 300 [Urine:300]    GENERAL:  Awake.  No acute distress.  PSYCH: confused, dementia, speaks very little , no acute agitation   HEENT:  Neck is Supple, trachea is midline, conjunctiva clear  CARDIOVASCULAR: Irregular rate and rhythm, Normal S1, S2, no loud murmurs  PULMONARY:  Clear to auscultation bilaterally   GI: Abdomen is soft, non tender, non-distended, bowel sounds present. No rebound or guarding   SKIN:  No cyanosis or clubbing, no obvious exanthems on exposed areas   MSK: Extremities are warm and well perfused. No pitting edema , dressing on right thigh   Neuro: Awake , confused, pleasant    Medications     sodium chloride Stopped (08/26/18 0808)       acetaminophen  975 mg Oral Q8H     cefuroxime  500 mg Oral Q12H MCKENNA     citalopram  10 mg Oral Daily     enoxaparin  40 mg Subcutaneous Q24H     levothyroxine  50 mcg Oral Daily     metoprolol tartrate  12.5 mg Oral BID     [START ON  8/27/2018] ranitidine  150 mg Oral Q24H     sodium chloride (PF)  3 mL Intracatheter Q8H       Data   All new lab data and imaging results from today have been reviewed       Recent Labs  Lab 08/26/18  0602 08/25/18  0756 08/24/18  1701 08/24/18  0807  08/22/18  2031   WBC 9.2  --   --   --   --  6.6   HGB 9.8* 11.0*  --   --   --  13.8   *  --   --   --   --  99     --  169  --   --  259     --   --   --   --  138   POTASSIUM 4.1  --  4.4 3.5  < > Canceled, Test credited   CHLORIDE 112*  --   --   --   --  108   CO2 22  --   --   --   --  27   BUN 25  --   --   --   --  27   CR 0.75  --  0.68  --   --  0.59   ANIONGAP 7  --   --   --   --  3   BOLA 7.9*  --   --   --   --  8.4*   GLC 85 111*  --   --   --  93   TROPI  --   --   --   --   --  <0.015   < > = values in this interval not displayed.    No results found for this or any previous visit (from the past 24 hour(s)).

## 2018-08-26 NOTE — PROVIDER NOTIFICATION
Discussed with Dr. Mitchell, upon nursing assessment patient was scratching skin, appearing to be severely itchy.  This writer applied Lac-Hydrin lotion over almost entire body as well as placement of bilateral mittens.  Pt did eventually appear to calm down.  Received orders to dc oxycodone and IV dilaudid, start po dilaudid prn for pain.  Add IV benedryl for severe itching.  Limit use of IV benedryl due to sleepiness that it can cause.

## 2018-08-26 NOTE — OP NOTE
Procedure Date: 08/24/2018      DATE OF SERVICE:  08/22/2018      PREOPERATIVE DIAGNOSIS:  A right femoral neck fracture.      POSTOPERATIVE DIAGNOSIS:  A right femoral neck fracture.      PROCEDURE:  Right bipolar hemiarthroplasty.      SURGEON:  Dung Buck MD      ASSISTANT:  Nidia Gaines PA-C      INDICATIONS:  Ms. Donnelly is a very pleasant, 88-year-old female with dementia, who fell at her assisted living and suffered a right femoral neck fracture.  She presented to [facility].  After a long discussion regarding treatment options, we elected to proceed with bipolar hemiarthroplasty.  Her family understands that this primarily would be for palliative measures and it would help to restore her previous level of function, if possible.  They understand the risks, benefits, and alternatives of surgery.      NARRATIVE EVENTS:  After thorough evaluation  proper identification of the patient to be operated on, Ms. Donnelly was taken to the operating room and she underwent general anesthetic, placed in the right lateral decubitus position with the right hip up and her right hip was prepped and draped in the usual sterile manner.  After appropriate surgical pause, to confirm the correct patient and the extremity to be operated on  Ancef.  Her right hip was approached through a lateral incision centered over the greater trochanter.  The skin down to the tensor fascia.  The fascia was split in line with the fibers in line with the femur, taking this down to the greater trochanter.  The bursal tissue was removed.  The anterior one-third of the gluteus medius was removed off the greater trochanter in a modified Hardinge fashion.  This took us down onto the joint capsule, which was T'ed.  The femoral neck fracture was easily identified and we made a femoral neck osteotomy 12 mm proximal to the lesser trochanter, just distal to the femoral neck fracture, and removed this bone from the field.  We then removed  the femoral head from the acetabulum and exposed the acetabulum.  We sized the acetabular physis to fit a size 46 [employer] bipolar femoral head.  Once this was done, we then paid our attention to the femur.  Here we removed the bone from the piriformis fossa and sequentially broached up to fit a size 10 Synergy cemented femoral stem with a -3 neck length and a 46 bipolar component.  This gave us good stability and full range of motion and appropriate leg length and soft tissue.  At this point, we prepared to place our final implants.  Using second generation cement techniques, we used a size 10 femur with an 8 mm distal centralizer into two batches of Simplex cement, one with antibiotics.  Once this cured we retrialed our femoral head and found that a -3 neck length and a 28 mm femoral head and 46 bipolar femoral component gave us good stability and full range of motion.  So, this was then impacted onto the trunnion and the bipolar was reduced.  After thorough irrigation of the wound, we then closed the joint capsule with interrupted 0 Vicryl suture, closed the abductor with number 5 Ethibond sutures through bone tunnels, and we closed the tensor fascia with interrupted and running 0 Vicryl sutures.  The patient was placed in a well-padded postoperative dressing and taken to the recovery room in stable condition.  She tolerated the procedure without difficulty.         MELINA CALERO MD             D: 2018   T: 2018   MT: NATASHA      Name:     EZEQUIEL BEST   MRN:      [id number]        Account:        [id number]   :      1930           Procedure Date: 2018      Document: [id number]

## 2018-08-26 NOTE — PLAN OF CARE
Problem: Patient Care Overview  Goal: Plan of Care/Patient Progress Review  PT: pt was sleeping upon arrival, will reschedule for tomorrow.

## 2018-08-27 LAB
COPATH REPORT: NORMAL
PLATELET # BLD AUTO: 191 10E9/L (ref 150–450)

## 2018-08-27 PROCEDURE — 36415 COLL VENOUS BLD VENIPUNCTURE: CPT | Performed by: ORTHOPAEDIC SURGERY

## 2018-08-27 PROCEDURE — 99232 SBSQ HOSP IP/OBS MODERATE 35: CPT | Performed by: INTERNAL MEDICINE

## 2018-08-27 PROCEDURE — 25000132 ZZH RX MED GY IP 250 OP 250 PS 637: Mod: GY

## 2018-08-27 PROCEDURE — 25000132 ZZH RX MED GY IP 250 OP 250 PS 637: Mod: GY | Performed by: ORTHOPAEDIC SURGERY

## 2018-08-27 PROCEDURE — 12000000 ZZH R&B MED SURG/OB

## 2018-08-27 PROCEDURE — A9270 NON-COVERED ITEM OR SERVICE: HCPCS | Mod: GY

## 2018-08-27 PROCEDURE — A9270 NON-COVERED ITEM OR SERVICE: HCPCS | Mod: GY | Performed by: HOSPITALIST

## 2018-08-27 PROCEDURE — A9270 NON-COVERED ITEM OR SERVICE: HCPCS | Mod: GY | Performed by: ORTHOPAEDIC SURGERY

## 2018-08-27 PROCEDURE — 25000132 ZZH RX MED GY IP 250 OP 250 PS 637: Mod: GY | Performed by: HOSPITALIST

## 2018-08-27 PROCEDURE — 85049 AUTOMATED PLATELET COUNT: CPT | Performed by: ORTHOPAEDIC SURGERY

## 2018-08-27 PROCEDURE — 25000128 H RX IP 250 OP 636: Performed by: INTERNAL MEDICINE

## 2018-08-27 PROCEDURE — 25000132 ZZH RX MED GY IP 250 OP 250 PS 637: Mod: GY | Performed by: INTERNAL MEDICINE

## 2018-08-27 PROCEDURE — A9270 NON-COVERED ITEM OR SERVICE: HCPCS | Mod: GY | Performed by: INTERNAL MEDICINE

## 2018-08-27 PROCEDURE — 25000128 H RX IP 250 OP 636: Performed by: ORTHOPAEDIC SURGERY

## 2018-08-27 RX ORDER — HYDROXYZINE HYDROCHLORIDE 25 MG/1
25 TABLET, FILM COATED ORAL EVERY 6 HOURS PRN
Qty: 30 TABLET | Refills: 0 | Status: SHIPPED | OUTPATIENT
Start: 2018-08-27 | End: 2018-08-27

## 2018-08-27 RX ORDER — METOPROLOL TARTRATE 1 MG/ML
5 INJECTION, SOLUTION INTRAVENOUS EVERY 6 HOURS PRN
Status: DISCONTINUED | OUTPATIENT
Start: 2018-08-27 | End: 2018-08-28 | Stop reason: HOSPADM

## 2018-08-27 RX ORDER — HYDROXYZINE HYDROCHLORIDE 25 MG/1
12.5 TABLET, FILM COATED ORAL EVERY 6 HOURS PRN
Qty: 30 TABLET | Refills: 0 | Status: SHIPPED | OUTPATIENT
Start: 2018-08-27 | End: 2018-09-25

## 2018-08-27 RX ORDER — HYDROMORPHONE HYDROCHLORIDE 1 MG/ML
0.2 INJECTION, SOLUTION INTRAMUSCULAR; INTRAVENOUS; SUBCUTANEOUS
Status: DISCONTINUED | OUTPATIENT
Start: 2018-08-27 | End: 2018-08-28 | Stop reason: HOSPADM

## 2018-08-27 RX ORDER — ACETAMINOPHEN 325 MG/1
650 TABLET ORAL EVERY 6 HOURS
Qty: 100 TABLET | Refills: 0 | Status: SHIPPED | OUTPATIENT
Start: 2018-08-27 | End: 2018-10-25

## 2018-08-27 RX ORDER — CEFTRIAXONE 1 G/1
1 INJECTION, POWDER, FOR SOLUTION INTRAMUSCULAR; INTRAVENOUS EVERY 24 HOURS
Status: DISCONTINUED | OUTPATIENT
Start: 2018-08-28 | End: 2018-08-28 | Stop reason: HOSPADM

## 2018-08-27 RX ADMIN — RANITIDINE 150 MG: 150 TABLET ORAL at 09:25

## 2018-08-27 RX ADMIN — LEVOTHYROXINE SODIUM 50 MCG: 50 TABLET ORAL at 09:25

## 2018-08-27 RX ADMIN — DIPHENHYDRAMINE HYDROCHLORIDE 25 MG: 50 INJECTION INTRAMUSCULAR; INTRAVENOUS at 22:39

## 2018-08-27 RX ADMIN — ACETAMINOPHEN 975 MG: 325 TABLET, FILM COATED ORAL at 09:25

## 2018-08-27 RX ADMIN — Medication 12.5 MG: at 09:25

## 2018-08-27 RX ADMIN — ENOXAPARIN SODIUM 40 MG: 40 INJECTION SUBCUTANEOUS at 09:25

## 2018-08-27 RX ADMIN — CEFUROXIME AXETIL 500 MG: 250 TABLET, FILM COATED ORAL at 09:25

## 2018-08-27 RX ADMIN — CITALOPRAM HYDROBROMIDE 10 MG: 10 TABLET ORAL at 09:25

## 2018-08-27 RX ADMIN — ACETAMINOPHEN 975 MG: 325 TABLET, FILM COATED ORAL at 00:11

## 2018-08-27 RX ADMIN — Medication: at 17:37

## 2018-08-27 RX ADMIN — SODIUM CHLORIDE: 9 INJECTION, SOLUTION INTRAVENOUS at 17:11

## 2018-08-27 RX ADMIN — HYDROXYZINE HYDROCHLORIDE 25 MG: 25 TABLET ORAL at 00:11

## 2018-08-27 ASSESSMENT — ACTIVITIES OF DAILY LIVING (ADL)
ADLS_ACUITY_SCORE: 35
ADLS_ACUITY_SCORE: 33
ADLS_ACUITY_SCORE: 35
ADLS_ACUITY_SCORE: 35
ADLS_ACUITY_SCORE: 33
ADLS_ACUITY_SCORE: 33

## 2018-08-27 NOTE — PLAN OF CARE
Problem: Patient Care Overview  Goal: Plan of Care/Patient Progress Review  Outcome: No Change  [id number]    Vitals: VSS. afebrile  Neuro: Disoriented x4  Lungs: LS clear, room air  Cardiac: HR irregular, A-fib new since admission  IV: SL  Skin: rash covering bilat thighs, itchy, applying ammonium lactate lotion  GI: BS hypoactive, no bm, passing flatus  : incontinent x4   Diet: Regular, total feed  Pain: PRN atarax and scheduled tylenol  Activity: Total assist, mech. lift  Plan: Plan to d.c to TCU today. SW involved. PO ceftin.

## 2018-08-27 NOTE — PLAN OF CARE
Problem: Patient Care Overview  Goal: Plan of Care/Patient Progress Review  Outcome: No Change  Pt up with A2 with lift. Slept most of the shift. Refused to eat or take meds- tried 5 different times. Family unsuccessful as well in encouraging food/fluids/meds. Pt will be discharging tomorrow to [facility] with [facility]. Unsure of exact time. Incontinent of urine. Will continue to monitor.

## 2018-08-27 NOTE — PROGRESS NOTES
"Orthopedic Surgery  8/27/2018    S: Patient voices no complaints today.     O: Blood pressure 151/82, pulse 62, temperature 97.4  F (36.3  C), temperature source Axillary, resp. rate 20, height 1.575 m (5' 2.01\"), weight 49.3 kg (108 lb 9.6 oz), SpO2 98 %, not currently breastfeeding.  Lab Results   Component Value Date    HGB 9.8 08/26/2018     No results found for: INR     Neurovascularly intact.  Calves are negative bilaterally, both soft and nontender.  The wound is C/D/I.  The wound looks good with minimal erythema of the surrounding skin.    A: Ms. Donnelly is doing well status post Procedure(s):  OPEN REDUCTION INTERNAL FIXATION HIP BIPOLAR.    P: Continue physical therapy.   Anticoagulation   Pain management  Discharge planning    Dung Buck  [phone]    "

## 2018-08-27 NOTE — PROGRESS NOTES
"SWS    D: Discharge planning continuing.. noted per MD that pt would be ready for discharge today.      I: Met with family ( son, Che, son, Reji and daughter-in-law, Vani) to discuss planning. They would like to pursue the option of pt's return back to her residence at [facility] if possible with increased services noting that they feel that there would be limited gain for pt to go to rehab facility and have daily therapy services. They have indicated ability to pay for increased services, discussed with them also that if there were \"gaps\" in the support that facility staff could provide that they could contract with home care agency to provide services on a private pay basis. CHARLEY has spoken to Mela from [facility], faxed clinical information for determination of pt's ability to return home. Meanwhile, [facility] has completed referral, they would have private room available in LTC area where they could accept pt, this was discussed by CHARLEY with pt's family. If plan would be for the LTC area, pt would be private pay unless family determined that they would like to have trail of PT services for pt under which circumstance pt would have Medicare/SNF coverage as long as PT services were in place.     A/P: Good family support, awaiting determination of [facility], will continue planning per this decision.   "

## 2018-08-27 NOTE — PLAN OF CARE
Problem: Patient Care Overview  Goal: Plan of Care/Patient Progress Review  Discharge Planner PT   Patient plan for discharge: Son hopeful for return to [facility] with increased services  Current status: Spoke with son, who reports confusion on recommendation for TCU. He reports pt has been needing a lift for about the last year at her care facility. Called [facility] who report pt is able to at times stand-pivot transfer (sometimes needing heavy Ax1) and at other times needs a lift. Per conversation with this writer, they report they are able to continue to provide this level of assistance. Son reports this is his preference. Spoke with CC; who plans to update SW. Will continue to follow 5x/week.  Barriers to return to prior living situation: None per conversation with [facility] staff  Recommendations for discharge: Return to [facility] with lift assist. Could trial HHPT in that setting.   Rationale for recommendations: Son reports his preference would be to keep pt in a familiar environment. Pt currently needing lift assistance       Entered by: Kerri Ornelas 08/27/2018 10:45 AM

## 2018-08-27 NOTE — PLAN OF CARE
Problem: Patient Care Overview  Goal: Plan of Care/Patient Progress Review  Outcome: No Change  VSS , BP slightly elevated. Alert, disoriented x4.  Does not respond to questions at times.  Assist x2/lift.  Pt incontinent of urine, family concerned pt has not had a BM.  Attempted to give miralax this evening but pt refusing to eat or drink anything.  Attempted dinner with no success.  R hip dressing CDI.  Fluids restarted, NS @ 100ml/h since pt did not eat today.  Pt on oral ceftin q12h.  Discharge tomorrow with HE transport to [facility].

## 2018-08-27 NOTE — PROGRESS NOTES
[facility]    Hospitalist Progress Note    Date of Service (when I saw the patient): 08/27/2018    Assessment & Plan      88 year old female with PMH including CVA, hypertension, severe dementia, anxiety who presents with a fall.  She resides at [facility] and had a fall on hardwood floor in the dining area.  She was brought to the ER by medics and here in the ER was noted to be wincing upon examination of her right hip.   Here in the ER basic lab workup was unremarkable.  Imaging was notable for a moderately displaced transverse fracture through the upper neck of the proximal right femur with cephalad displacement of the distal fragment.  EKG here in the emergency room did show atrial fibrillation with rate in the low 100s which is reportedly a new diagnosis.  Orthopedic surgery was contacted and patient was admitted here for further care.      Assessment and Plan:   1. Right femoral neck fracture: s/p Procedure(s):OPEN REDUCTION INTERNAL FIXATION HIP BIPOLAR  -- Defer post-op cares including activity, wound care and DVT Prophylaxis to surgery      2.   History of prior hemorrhagic stroke and severe dementia: She is unable to provide any meaningful history other than some yes or no questions. At this point we will hold her aspirin and would keep delirium precautions as she is at some increased risk while hospitalized with an acute medical problem.  She reportedly resides in memory care. Will need TCU     3.   Hypertension: start metoprolol.      4.   Atrial fibrillation, possible new diagnosis: Dr. Garcia discussed with family at bedside, they are not sure if this is entirely new and might have been present before, talked about risk of stroke and anti-coagulation, would not start now anyway with recent surgery, and not a good candidate due to fall risk and hx of hemorrhagic stroke as well , family in agreement.   -- normal TSH.    -- ECHO reviewd, shows normal LV and preserved EF   --  Continue metoprolol low dose 12.5 mg BID , monitor HR      5.  age indeterminate moderate anterior wedge compression deformity of the L1 vertebral body    6. S/p Pacemaker placement.     7. UTI. On IV ceftriaxone , change to PO ceftin based on cultures, day 6, no need for anb on dc  DVT Prophylaxis: Pneumatic Compression Devices, lovenox     Code Status: DNR/DNI    Disposition: Per primary team, likely today to transitional care if bed is available    Kyle Moshannenmario Paula    Interval History     Discussed with the patient nurse  Patient Pleasant denies complaint    Physical Exam   Temp: 97.3  F (36.3  C) Temp src: Axillary BP: 147/88 Pulse: 62 Heart Rate: 89 Resp: 20 SpO2: 95 % O2 Device: None (Room air)    Vitals:    08/23/18 0035   Weight: 49.3 kg (108 lb 9.6 oz)     Vital Signs with Ranges  Temp:  [97.3  F (36.3  C)-97.4  F (36.3  C)] 97.3  F (36.3  C)  Pulse:  [62] 62  Heart Rate:  [70-89] 89  Resp:  [14-20] 20  BP: (147-175)/(82-88) 147/88  SpO2:  [95 %-98 %] 95 %  I/O last 3 completed shifts:  In: 1965 [P.O.:430; I.V.:1535]  Out: 100 [Urine:100]    GENERAL:  Awake.  No acute distress.  PSYCH: confused, dementia, speaks very little , no acute agitation   HEENT:  Neck is Supple, trachea is midline, conjunctiva clear  CARDIOVASCULAR: Irregular rate and rhythm, Normal S1, S2, no loud murmurs  PULMONARY:  Clear to auscultation bilaterally   GI: Abdomen is soft, non tender, non-distended, bowel sounds present. No rebound or guarding   SKIN:  No cyanosis or clubbing, no obvious exanthems on exposed areas   MSK: Extremities are warm and well perfused. No pitting edema , dressing on right thigh   Neuro: Awake , confused, pleasant    Medications     sodium chloride Stopped (08/26/18 0808)       acetaminophen  975 mg Oral Q8H     cefuroxime  500 mg Oral Q12H MCKENNA     citalopram  10 mg Oral Daily     enoxaparin  40 mg Subcutaneous Q24H     levothyroxine  50 mcg Oral Daily     metoprolol tartrate  12.5 mg Oral BID      ranitidine  150 mg Oral Q24H     sodium chloride (PF)  3 mL Intracatheter Q8H       Data   All new lab data and imaging results from today have been reviewed       Recent Labs  Lab 08/27/18  0724 08/26/18  0602 08/25/18  0756 08/24/18  1701 08/24/18  0807  08/22/18  2031   WBC  --  9.2  --   --   --   --  6.6   HGB  --  9.8* 11.0*  --   --   --  13.8   MCV  --  102*  --   --   --   --  99    179  --  169  --   --  259   NA  --  141  --   --   --   --  138   POTASSIUM  --  4.1  --  4.4 3.5  < > Canceled, Test credited   CHLORIDE  --  112*  --   --   --   --  108   CO2  --  22  --   --   --   --  27   BUN  --  25  --   --   --   --  27   CR  --  0.75  --  0.68  --   --  0.59   ANIONGAP  --  7  --   --   --   --  3   BOLA  --  7.9*  --   --   --   --  8.4*   GLC  --  85 111*  --   --   --  93   TROPI  --   --   --   --   --   --  <0.015   < > = values in this interval not displayed.    No results found for this or any previous visit (from the past 24 hour(s)).

## 2018-08-28 ENCOUNTER — ASSISTED LIVING VISIT (OUTPATIENT)
Dept: GERIATRICS | Facility: CLINIC | Age: 83
End: 2018-08-28
Payer: MEDICARE

## 2018-08-28 VITALS
HEIGHT: 62 IN | DIASTOLIC BLOOD PRESSURE: 84 MMHG | HEART RATE: 91 BPM | WEIGHT: 108.6 LBS | TEMPERATURE: 95.5 F | BODY MASS INDEX: 19.98 KG/M2 | OXYGEN SATURATION: 99 % | SYSTOLIC BLOOD PRESSURE: 163 MMHG | RESPIRATION RATE: 20 BRPM

## 2018-08-28 VITALS
DIASTOLIC BLOOD PRESSURE: 82 MMHG | SYSTOLIC BLOOD PRESSURE: 148 MMHG | RESPIRATION RATE: 16 BRPM | TEMPERATURE: 97.5 F | OXYGEN SATURATION: 91 % | HEART RATE: 71 BPM

## 2018-08-28 DIAGNOSIS — R63.8 DECREASED ORAL INTAKE: ICD-10-CM

## 2018-08-28 DIAGNOSIS — Z96.641 STATUS POST TOTAL REPLACEMENT OF RIGHT HIP: Primary | ICD-10-CM

## 2018-08-28 DIAGNOSIS — F01.50 VASCULAR DEMENTIA WITHOUT BEHAVIORAL DISTURBANCE (H): ICD-10-CM

## 2018-08-28 DIAGNOSIS — I11.9 HYPERTENSIVE HEART DISEASE WITHOUT HEART FAILURE: ICD-10-CM

## 2018-08-28 DIAGNOSIS — D62 ANEMIA DUE TO BLOOD LOSS, ACUTE: ICD-10-CM

## 2018-08-28 DIAGNOSIS — I48.91 ATRIAL FIBRILLATION, NEW ONSET (H): ICD-10-CM

## 2018-08-28 PROCEDURE — 99239 HOSP IP/OBS DSCHRG MGMT >30: CPT | Performed by: INTERNAL MEDICINE

## 2018-08-28 PROCEDURE — 25000128 H RX IP 250 OP 636: Performed by: INTERNAL MEDICINE

## 2018-08-28 PROCEDURE — A9270 NON-COVERED ITEM OR SERVICE: HCPCS | Mod: GY | Performed by: ORTHOPAEDIC SURGERY

## 2018-08-28 PROCEDURE — 25000132 ZZH RX MED GY IP 250 OP 250 PS 637: Mod: GY

## 2018-08-28 PROCEDURE — A9270 NON-COVERED ITEM OR SERVICE: HCPCS | Mod: GY

## 2018-08-28 PROCEDURE — 25000132 ZZH RX MED GY IP 250 OP 250 PS 637: Mod: GY | Performed by: ORTHOPAEDIC SURGERY

## 2018-08-28 PROCEDURE — 25000132 ZZH RX MED GY IP 250 OP 250 PS 637: Mod: GY | Performed by: HOSPITALIST

## 2018-08-28 PROCEDURE — 25000128 H RX IP 250 OP 636: Performed by: ORTHOPAEDIC SURGERY

## 2018-08-28 PROCEDURE — A9270 NON-COVERED ITEM OR SERVICE: HCPCS | Mod: GY | Performed by: HOSPITALIST

## 2018-08-28 RX ORDER — LIDOCAINE 50 MG/G
OINTMENT TOPICAL DAILY PRN
COMMUNITY
End: 2019-11-05

## 2018-08-28 RX ORDER — HYDROMORPHONE HYDROCHLORIDE 2 MG/1
2 TABLET ORAL EVERY 4 HOURS PRN
Qty: 15 TABLET | Refills: 0 | Status: SHIPPED | DISCHARGE
Start: 2018-08-28 | End: 2018-09-25

## 2018-08-28 RX ORDER — MINERAL OIL/I-PROP MYR/WATER
LOTION (ML) TOPICAL 3 TIMES DAILY PRN
Qty: 236 ML | Refills: 0 | Status: SHIPPED | OUTPATIENT
Start: 2018-08-28 | End: 2018-09-25

## 2018-08-28 RX ORDER — METOPROLOL TARTRATE 25 MG/1
12.5 TABLET, FILM COATED ORAL 2 TIMES DAILY
Qty: 16 TABLET | Refills: 98 | DISCHARGE
Start: 2018-08-28 | End: 2018-08-28

## 2018-08-28 RX ORDER — METOPROLOL TARTRATE 25 MG/1
12.5 TABLET, FILM COATED ORAL 2 TIMES DAILY
Qty: 60 TABLET | Refills: 0 | Status: SHIPPED | OUTPATIENT
Start: 2018-08-28 | End: 2018-10-25

## 2018-08-28 RX ADMIN — CEFTRIAXONE SODIUM 1 G: 1 INJECTION, POWDER, FOR SOLUTION INTRAMUSCULAR; INTRAVENOUS at 00:50

## 2018-08-28 RX ADMIN — LEVOTHYROXINE SODIUM 50 MCG: 50 TABLET ORAL at 11:08

## 2018-08-28 RX ADMIN — Medication 0.2 MG: at 11:09

## 2018-08-28 RX ADMIN — DIPHENHYDRAMINE HYDROCHLORIDE 25 MG: 50 INJECTION INTRAMUSCULAR; INTRAVENOUS at 04:20

## 2018-08-28 RX ADMIN — ENOXAPARIN SODIUM 40 MG: 40 INJECTION SUBCUTANEOUS at 11:09

## 2018-08-28 RX ADMIN — Medication 12.5 MG: at 11:08

## 2018-08-28 RX ADMIN — CITALOPRAM HYDROBROMIDE 10 MG: 10 TABLET ORAL at 11:09

## 2018-08-28 RX ADMIN — RANITIDINE 150 MG: 150 TABLET ORAL at 11:08

## 2018-08-28 RX ADMIN — Medication 0.2 MG: at 00:48

## 2018-08-28 RX ADMIN — Medication: at 11:09

## 2018-08-28 ASSESSMENT — ACTIVITIES OF DAILY LIVING (ADL)
ADLS_ACUITY_SCORE: 33

## 2018-08-28 NOTE — PLAN OF CARE
Problem: Patient Care Overview  Goal: Plan of Care/Patient Progress Review  Outcome: Adequate for Discharge Date Met: 08/28/18  Pt changed, dressed, lotion applied. Took meds this morning crushed with vanilla ice cream. Refused breakfast. meds to be filled here. MD paged for discharge summary. [facility]. Son at bedside aware of everything. Adequate for discharge.

## 2018-08-28 NOTE — DISCHARGE SUMMARY
[facility]  Discharge Summary  Name: April Donnelly    MRN: [id number]  YOB: 1930    Age: 88 year old  Date of Discharge:  8/28/2018  Date of Admission: 8/22/2018  Primary Care Provider: Kvng Cowart  Discharge Physician:  Wilver Zamora MD  Discharging Service:  Hospitalist      Discharge Diagnoses:  Proximal right femur fracture  Possible new A. Fib  UTI  Severe dementia  HTN  Anxiety  Age indeterminate L1 compression fracture  History pacemaker placement  Acute blood loss anemia     Hospital Course:  April Donnelly is a 88 year old female with PMH including CVA, hypertension, severe dementia, anxiety who presents with a fall.  She resides at [facility] and had a fall on hardwood floor in the dining area.  She was brought to the ER by medics and here in the ER was noted to be wincing upon examination of her right hip.   Here in the ER basic lab workup was unremarkable.  Imaging was notable for a moderately displaced transverse fracture through the upper neck of the proximal right femur with cephalad displacement of the distal fragment.  EKG here in the emergency room did show atrial fibrillation with rate in the low 100s which is reportedly a new diagnosis.  Orthopedic surgery was contacted and patient was admitted here for further care.  Underwent R bipolar hemiarthroplasty.  Receiving intermittent pain control with acetaminophen, Atarax, and some occasional low-dose Dilaudid.  Unable to really participate with PT.  Extensively discussed with the patient's son regarding overall prognosis is grim if she does not improve any oral intake.  He understands this and if over the next week she cannot maintain hydration they would like to discuss hospice enrollment.  Discharging back to [facility].  Trial of home PT although unsure that she will be able to participate.       Assessment and Plan:   1. Right femoral neck fracture: s/p Procedure(s): R bipolar  hemiarthroplasty.  Acetaminophen, atarax prn, and low dose dilaudid 0.2mg q 4 hr prn for pain.  Aspirin 325mg bid for DVT proph for 5 weeks.  F/u with orthopedics.      2.   History of prior hemorrhagic stroke and severe dementia: She is unable to provide any meaningful history other than some yes or no questions. Per son she has been wheelchair bound for one year.  Poor oral intake here.  Plan to discharge back to previous [facility] care.  Discussed with son at length and if patient continues to have poor oral intake over the next week that enrollment in hospice may be in agreement.  He says he and the family agree with this.      3.   Hypertension: metoprolol 12.5mg bid      4.   Atrial fibrillation, possible new diagnosis: Dr. Garcia discussed with family at bedside, they are not sure if this is entirely new and might have been present before.  She was on daily metoprolol 12.5 mg. Talked about risk of stroke and anti-coagulation, would not start now anyway with recent surgery and not a good candidate due to fall risk and hx of hemorrhagic stroke as well.  Family in agreement.  Normal TSH.  ECHO reviewd, shows normal LV and preserved EF   -discharge on metoprolol low dose 12.5 mg BID       5.  Age indeterminate moderate anterior wedge compression deformity of the L1 vertebral body     6. S/p Pacemaker placement.      7. UTI. Urine culture grew E coli.  Received 7 days of IV ceftriaxone.  Completed course.    8.  Acute blood loss anemia: Hemoglobin 13 then down to 9.8 following surgery.  Appears stable and vitals are stable.       Discharge Disposition:  Discharged to [facility] care     Allergies:  Allergies   Allergen Reactions     Hydrocodone      Lisinopril      Terazosin         Discharge Medications:   Current Discharge Medication List      START taking these medications    Details   HYDROmorphone (DILAUDID) 2 MG tablet Take 1 tablet (2 mg) by mouth every 4 hours as needed for moderate to severe pain  Qty:  15 tablet, Refills: 0    Associated Diagnoses: Closed fracture of neck of right femur, initial encounter (H)      Skin Protectants, Misc. (HYDROCERIN) LOTN lotion Apply topically 3 times daily as needed for other (itchy skin)  Qty: 236 mL, Refills: 0    Associated Diagnoses: Itching         CONTINUE these medications which have CHANGED    Details   acetaminophen (TYLENOL) 325 MG tablet Take 2 tablets (650 mg) by mouth every 6 hours  Qty: 100 tablet, Refills: 0    Associated Diagnoses: Closed fracture of neck of right femur, initial encounter (H)      aspirin 325 MG EC tablet Take one Aspirin tab twice daily for 5 weeks.  Qty: 70 tablet, Refills: 0    Associated Diagnoses: Closed fracture of neck of right femur, initial encounter (H)      hydrOXYzine (ATARAX) 25 MG tablet Take 0.5 tablets (12.5 mg) by mouth every 6 hours as needed  Qty: 30 tablet, Refills: 0    Associated Diagnoses: Closed fracture of neck of right femur, initial encounter (H)      metoprolol tartrate (LOPRESSOR) 25 MG tablet Take 0.5 tablets (12.5 mg) by mouth 2 times daily  Qty: 60 tablet, Refills: 0    Associated Diagnoses: Benign essential hypertension; Paroxysmal atrial fibrillation (H)         CONTINUE these medications which have NOT CHANGED    Details   camphor-menthol (DERMASARRA) 0.5-0.5 % LOTN Apply lotion to body daily  Qty: 1 Bottle, Refills: 11    Associated Diagnoses: Dry skin      citalopram (CELEXA) 10 MG tablet Take 1 tablet (10 mg) by mouth daily  Qty: 31 tablet, Refills: 98    Associated Diagnoses: Adjustment disorder with depressed mood      levothyroxine (SYNTHROID/LEVOTHROID) 50 MCG tablet TAKE 1 TABLET BY MOUTH ONCE DAILY  Qty: 31 tablet, Refills: 97    Associated Diagnoses: Other specified hypothyroidism      Menthol-Zinc Oxide (CALMOSEPTINE) 0.44-20.6 % OINT Externally apply 71 g topically 2 times daily APPLY TOPICALLY TO COCCYX TWICE DAILY AND WITH ALL INCONTINENT CHANGES FOR SKIN ITCH AND BREAKDOWN  Qty: 113 g, Refills: 11  "   Associated Diagnoses: Rash              Condition on Discharge:  Discharge condition: Poor   Discharge vitals: Blood pressure 163/84, pulse 91, temperature 95.5  F (35.3  C), temperature source Axillary, resp. rate 20, height 1.575 m (5' 2.01\"), weight 49.3 kg (108 lb 9.6 oz), SpO2 99 %, not currently breastfeeding.   Code status on discharge: DNR / DNI     History of Illness:  See detailed admission note for full details.    Physical Exam:  Blood pressure 163/84, pulse 91, temperature 95.5  F (35.3  C), temperature source Axillary, resp. rate 20, height 1.575 m (5' 2.01\"), weight 49.3 kg (108 lb 9.6 oz), SpO2 99 %, not currently breastfeeding.  Wt Readings from Last 1 Encounters:   08/23/18 49.3 kg (108 lb 9.6 oz)     Constitutional: Awake, NAD  Eyes: sclera white   HEENT:  MMM, [place]  Respiratory: no respiratory distress, lungs cta bilaterally, no crackles or wheeze  Cardiovascular: irregularly, irregular rhythm, no murmur, mild tachycardia  GI: non-tender, not distended, bowel sounds present  Skin: no rash   Musculoskeletal/extremities: trace bilateral LE edema  Neurologic: alert, hard of hearing.  Minimal one word answers.  Not following comands  Psychiatric: calm, cooperative, normal affect    Procedures other than Imaging:  R bipolar hemiarthroplasty     Imaging:  Results for orders placed or performed during the hospital encounter of 08/22/18   XR Pelvis and Hip Right 2 Views    Narrative    PELVIS AND HIP RIGHT TWO VIEWS 8/22/2018 8:56 PM     COMPARISON: None    HISTORY: Fall, pain.       Impression    IMPRESSION: There is a moderately displaced transverse fracture  through the upper neck of the proximal right femur with cephalad  displacement of the distal fragment. No other fractures noted. Hip  joint alignment bilaterally appears normal.    SAMIRA GOMEZ MD   Lumbar spine XR, 2-3 views    Narrative    LUMBAR SPINE TWO TO THREE VIEWS  8/22/2018 8:56 PM     COMPARISON: None    HISTORY: Fall, question " pain.       Impression    IMPRESSION: There is age indeterminate moderate anterior wedge  compression deformity of the L1 vertebral body. Vertebral body heights  of the lumbar spine are otherwise normal. Alignment of the lumbar  vertebrae is normal. There is no definite evidence for recent fracture  of the lumbar spine.    SAMIRA GOMEZ MD   XR Pelvis w Hip Port Right 1 View    Narrative    XR PORTABLE PELVIS AND RIGHT HIP ONE VIEW   8/24/2018 3:48 PM     HISTORY: Postoperative.    COMPARISON: 8/22/2018      Impression    IMPRESSION: Interval placement of right Alexandru Lew prosthesis. The  prosthesis is in good position. No evidence for fracture or  dislocation on this exam. Mild degenerative changes seen in the left  hip and symphysis pubis.    MARITA DWYER MD        Consultations:  Consultation during this admission received from orthopedics.       Recent Lab Results:    Recent Labs  Lab 08/27/18  0724 08/26/18  0602 08/25/18  0756 08/24/18  1701 08/22/18 2031   WBC  --  9.2  --   --  6.6   HGB  --  9.8* 11.0*  --  13.8   HCT  --  31.2*  --   --  42.2   MCV  --  102*  --   --  99    179  --  169 259       Recent Labs  Lab 08/22/18  2145   CULT >100,000 colonies/mLEscherichia coli*  10,000 to 50,000 colonies/mLmixed urogenital gertrudis       Recent Labs  Lab 08/26/18  0602 08/25/18  0756 08/24/18  1701 08/24/18  0807 08/23/18  0945  08/22/18 2031     --   --   --   --   --  138   POTASSIUM 4.1  --  4.4 3.5 4.1  < > Canceled, Test credited   CHLORIDE 112*  --   --   --   --   --  108   CO2 22  --   --   --   --   --  27   ANIONGAP 7  --   --   --   --   --  3   GLC 85 111*  --   --   --   --  93   BUN 25  --   --   --   --   --  27   CR 0.75  --  0.68  --   --   --  0.59   GFRESTIMATED 73  --  81  --   --   --  >90   GFRESTBLACK 88  --  >90  --   --   --  >90   BOLA 7.9*  --   --   --   --   --  8.4*   MAG  --   --   --  1.8 1.7  --   --    < > = values in this interval not displayed.    Recent  Labs  Lab 08/22/18  2145   COLOR Yellow   APPEARANCE Slightly Cloudy   URINEGLC Negative   URINEBILI Negative   URINEKETONE Negative   SG 1.020   UBLD Small*   URINEPH 6.0   PROTEIN Negative   NITRITE Positive*   LEUKEST Large*   RBCU 7*   WBCU >182*          Pending Results:    Unresulted Labs Ordered in the Past 30 Days of this Admission     No orders found from 6/23/2018 to 8/23/2018.         These results will be followed up by patient's primary care provider.    Discharge Instructions and Follow-Up:     Discharge Procedure Orders  Home Care PT Referral for Hospital Discharge   Referral Type: Home Health Therapies & Aides     Home care nursing referral   Referral Type: Home Health Therapies & Aides     Wound care   Order Comments: Site:   R hip  Instructions:  Leave aquacel dressing in place until post-op follow-up.  If it becomes loose or soiled then remove and replace with daily dry dressings.     Activity - Up with assistive device   Order Specific Question Answer Comments   Is discharge order? Yes      Weight bearing status   Order Comments: WBAT     Reason for your hospital stay   Order Comments: Following right hip bipolar hemiarthroplasty after displaced femoral neck fracture.  Has had minimal oral intake while here.  If over the next week she is unable to maintain hydration or nutrition would recommend hospice consideration at that time.     Mantoux instructions   Order Comments: Give two-step Mantoux (PPD) Per Facility Policy Yes     Follow Up and recommended labs and tests   Order Comments: Follow up with Nidia Gaines PA-C within 12-16 days from surgery.  If you do not already have a follow up appointment scheduled, please call our [place] office: [phone].  No follow up labs or test are needed.    Consideration for hospice if unable to maintain hydration or nutrition over the next week.     MD face to face encounter   Order Comments: Documentation of Face to Face and Certification for Home  Health Services    I certify that patient: April Donnelly is under my care and that I, or a nurse practitioner or physician's assistant working with me, had a face-to-face encounter that meets the physician face-to-face encounter requirements with this patient on: 8/28/2018.    This encounter with the patient was in whole, or in part, for the following medical condition, which is the primary reason for home health care: work on mobility and home assessment.    I certify that, based on my findings, the following services are medically necessary home health services: Nursing, Occupational Therapy and Physical Therapy.    My clinical findings support the need for the above services because: Occupational Therapy Services are needed to assess and treat cognitive ability and address ADL safety due to impairment in mobility following hip surgery.    Further, I certify that my clinical findings support that this patient is homebound (i.e. absences from home require considerable and taxing effort and are for medical reasons or [employer] services or infrequently or of short duration when for other reasons) because: Requires assistance of another person or specialized equipment to access medical services because patient: Has prohibitive pain during ambulation...    Based on the above findings. I certify that this patient is confined to the home and needs intermittent skilled nursing care, physical therapy and/or speech therapy.  The patient is under my care, and I have initiated the establishment of the plan of care.  This patient will be followed by a physician who will periodically review the plan of care.  Physician/Provider to provide follow up care: Kvng Cowart    Attending hospital physician (the Medicare certified [facility] provider): Dung Buck  Physician Signature: See electronic signature associated with these discharge orders.  Date: 8/28/2018     DNR/DNI     Advance Diet as Tolerated   Order  Comments: Follow this diet upon discharge: regular   Order Specific Question Answer Comments   Is discharge order? Yes            Recommendations:  -po dilaudid 2mg q 4 hr prn along with acetaminophen and prn atarax for pain  -trial home health PT if can participate  -encourage po intake  -metoprolol 12.5mg bid  -aspirin 325mg bid for DVT proph per ortho  -if not taking in much orally to maintain hydration or nutrition over the next week, consideration for hospice enrollment.    I, Wilver Zamora, personally saw the patient today and spent greater than 30 minutes discharging this patient.    Wilver Zamora MD

## 2018-08-28 NOTE — PROGRESS NOTES
"Orthopedic Surgery  8/28/2018  POD#: 4    S: Patient voices no complaints today.     O: Blood pressure 135/86, pulse 91, temperature 97.4  F (36.3  C), temperature source Axillary, resp. rate 20, height 1.575 m (5' 2.01\"), weight 49.3 kg (108 lb 9.6 oz), SpO2 96 %, not currently breastfeeding.  Lab Results   Component Value Date    HGB 9.8 08/26/2018     No results found for: INR          Neurovascularly intact.  Calves are negative bilaterally, both soft and nontender.  The wound is C/D/I.  The wound looks good with minimal erythema of the surrounding skin.    A: Ms. Donnelly is doing well status post Procedure(s):  R hip bipolar hemiarthroplasty    P:   1. Mobilize and continue physical therapy. WBAT. No hip precautions.  2. Anticoagulation - dc on ASA  3. Pain management - controlled  4. Anticipate discharge to memory care today      Nidia Gaines PA-C  O: [phone]  "

## 2018-08-28 NOTE — LETTER
8/28/2018        RE: April Donnelly  [facility]  [address] [facility] [address]        [place] [facility]  PRIMARY CARE PROVIDER AND CLINIC:  Kvng Cowart [address] [place] [address] / [facility] [address]  Chief Complaint   Patient presents with     Hospital F/U     [place] Medical Record Number:  [id number]    HPI:    April Donnelly is a 88 year old  (1/30/1930),admitted to the [facility] from [facility].  Hospital stay 08/22/2018 through 08/26/2018.  Admitted to this facility for  rehab, medical management and nursing care.  HPI information obtained from: facility chart records and [facility] chart review.     Hospital Course:  April Donnelly is a 88 year old female with PMH including CVA, hypertension, severe dementia, anxiety who presents with a fall.  She resides at [facility] and had a fall on hardwood floor in the dining area.  She was brought to the ER by medics and here in the ER was noted to be wincing upon examination of her right hip.   Here in the ER basic lab workup was unremarkable.  Imaging was notable for a moderately displaced transverse fracture through the upper neck of the proximal right femur with cephalad displacement of the distal fragment.  EKG here in the emergency room did show atrial fibrillation with rate in the low 100s which is reportedly a new diagnosis.  Orthopedic surgery was contacted and patient was admitted here for further care.  Underwent R bipolar hemiarthroplasty.  Receiving intermittent pain control with acetaminophen, Atarax, and some occasional low-dose Dilaudid.  Unable to really participate with PT.  Extensively discussed with the patient's son regarding overall prognosis is grim if she does not improve any oral intake.  He understands this and if over the next week she cannot maintain hydration they would like to discuss hospice enrollment.  Discharging back to [facility].   Trial of home PT although unsure that she will be able to participate.      Current issues are:        Status post total replacement of right hip  S/P fall and fracture. She is returning today to memory care. Seen today sitting up in new broda chair, she is calm, pleasant. Conversation is mostly nonsensical.  She is feeding herself and drinking juice from a cup. Does resist touch to right lower extremity. Hip incision is covered with acquacel dressing and unable to visualize. Staff reporting she is refusing to bear weight and will be a 2 person assist with boby for transfers. Notes indicate she was unable to participate in PT post fixation. Would like to coordinate pain medication prior to [facility] PT.    Hypertensive heart disease without heart failure  Atrial fibrillation, new onset (H)  Prior use of Metoprolol 12.5 po daily. BP elevated and discharged on BID dosing. Echo shows preserved EF and normal LV.      Anemia due to blood loss, acute  13.8-->11.0-->9.8. Continue to follow post surgery.    Decreased oral intake  Vascular dementia without behavioral disturbance  Poor oral intake at hospital and family discussion with physician prior to discharge that if no improvement and unable to maintain hydration hospice could be appropriate. Appears with some improvement back in memory care with familiar caregivers and surroundings. No s/s of aspiration and tolerating regular diet. Consider speech to eval.      CODE STATUS/ADVANCE DIRECTIVES DISCUSSION:   DNR / DNI  Patient's living condition: lives in an assisted living facility-memory care    ALLERGIES:Hydrocodone; Lisinopril; and Terazosin  PAST MEDICAL HISTORY:  has a past medical history of Adjustment disorder with mixed anxiety and depressed mood (12/2/2016); Adjustment disorder with mixed anxiety and depressed mood (12/2/2016); Cerebrovascular accident (H) (12/2/2016); CVA (cerebral vascular accident) (H); Hemorrhagic stroke (H) (12/2/2016); Hypertensive heart  disease without heart failure (12/2/2016); Severe dementia (12/2/2016); Syncope (12/2/2016); and Thyroid disease.  PAST SURGICAL HISTORY:  has a past surgical history that includes Thyroidectomy and Open reduction internal fixation hip bipolar (Right, 8/24/2018).  FAMILY HISTORY: Family history is unknown by patient.  SOCIAL HISTORY:  reports that she has never smoked. She has never used smokeless tobacco. She reports that she does not drink alcohol or use illicit drugs.     Post Discharge Medication Reconciliation Status: discharge medications reconciled, continue medications without change.  Current Outpatient Prescriptions   Medication Sig Dispense Refill     acetaminophen (TYLENOL) 325 MG tablet Take 2 tablets (650 mg) by mouth every 6 hours 100 tablet 0     aspirin 325 MG EC tablet Take one Aspirin tab twice daily for 5 weeks. 70 tablet 0     camphor-menthol (DERMASARRA) 0.5-0.5 % LOTN Apply lotion to body daily 1 Bottle 11     citalopram (CELEXA) 10 MG tablet Take 1 tablet (10 mg) by mouth daily 31 tablet 98     HYDROmorphone (DILAUDID) 2 MG tablet Take 1 tablet (2 mg) by mouth every 4 hours as needed for moderate to severe pain 15 tablet 0     hydrOXYzine (ATARAX) 25 MG tablet Take 0.5 tablets (12.5 mg) by mouth every 6 hours as needed 30 tablet 0     levothyroxine (SYNTHROID/LEVOTHROID) 50 MCG tablet TAKE 1 TABLET BY MOUTH ONCE DAILY 31 tablet 97     lidocaine (XYLOCAINE) 5 % ointment Apply topically daily       Menthol-Zinc Oxide (CALMOSEPTINE) 0.44-20.6 % OINT Externally apply 71 g topically 2 times daily APPLY TOPICALLY TO COCCYX TWICE DAILY AND WITH ALL INCONTINENT CHANGES FOR SKIN ITCH AND BREAKDOWN 113 g 11     metoprolol tartrate (LOPRESSOR) 25 MG tablet Take 0.5 tablets (12.5 mg) by mouth 2 times daily 60 tablet 0     Skin Protectants, Misc. (HYDROCERIN) LOTN lotion Apply topically 3 times daily as needed for other (itchy skin) 236 mL 0     [DISCONTINUED] metoprolol tartrate (LOPRESSOR) 25 MG tablet  Take 0.5 tablets (12.5 mg) by mouth 2 times daily 16 tablet 98     [DISCONTINUED] metoprolol tartrate (LOPRESSOR) 25 MG tablet Take 0.5 tablets (12.5 mg) by mouth daily 16 tablet 98     Patient Active Problem List   Diagnosis     Cerebrovascular accident (H)     Hemorrhagic stroke (H)     Syncope     Adjustment disorder with mixed anxiety and depressed mood     Hypertensive heart disease without heart failure     Advance care planning     Closed nondisplaced fracture of pelvis with routine healing, unspecified part of pelvis, subsequent encounter     Vascular dementia without behavioral disturbance     Acquired hypothyroidism     Rash and nonspecific skin eruption     Hemiparesis following cerebrovascular accident (CVA) (H)     Hip fracture (H)     S/P total hip arthroplasty     ROS:  Unobtainable secondary to cognitive impairment. 148    Exam:  /82  Pulse 71  Temp 97.5  F (36.4  C)  Resp 16  SpO2 91%  GENERAL APPEARANCE:  Alert, in no distress, siting in broda chair  ENT:  Mouth and posterior oropharynx normal, dry mucous membranes, [place]  EYES:  EOM, conjunctivae, lids, pupils and irises normal  NECK:  No adenopathy,masses or thyromegaly  RESP:  respiratory effort and palpation of chest normal, lungs grossly clear but diminished   CV:  Palpation and auscultation of heart done , trace edema, rate-elevated, grade 2/6 murmur, irregular rhythm   ABDOMEN:  normal bowel sounds, soft, nontender, hypoactive bowel sounds  M/S:   wheelchair is baseline, EZ Stand for transfers but now use of boby  SKIN: Overall intact few areas with scabs, scarring marks from previous scratching, incision not visualized  NEURO:   Examination of sensation by touch normal  PSYCH:  insight and judgement impaired, memory impaired        Lab/Diagnostic data:    CBC RESULTS:   Recent Labs   Lab Test  08/27/18   0724  08/26/18   0602  08/25/18   0756   08/22/18   2031   WBC   --   9.2   --    --   6.6   RBC   --   3.06*   --    --   4.28    HGB   --   9.8*  11.0*   --   13.8   HCT   --   31.2*   --    --   42.2   MCV   --   102*   --    --   99   MCH   --   32.0   --    --   32.2   MCHC   --   31.4*   --    --   32.7   RDW   --   13.1   --    --   12.8   PLT  191  179   --    < >  259    < > = values in this interval not displayed.       Last Basic Metabolic Panel:  Recent Labs   Lab Test  08/26/18   0602  08/25/18   0756  08/24/18   1701   08/22/18   2031   NA  141   --    --    --   138   POTASSIUM  4.1   --   4.4   < >  Canceled, Test credited   CHLORIDE  112*   --    --    --   108   BOLA  7.9*   --    --    --   8.4*   CO2  22   --    --    --   27   BUN  25   --    --    --   27   CR  0.75   --   0.68   --   0.59   GLC  85  111*   --    --   93    < > = values in this interval not displayed.       TSH   Date Value Ref Range Status   08/23/2018 1.48 0.40 - 4.00 mU/L Final   04/26/2017 1.89 0.40 - 4.00 mU/L Final       ASSESSMENT/PLAN:  (Z96.641) Status post total replacement of right hip  (primary encounter diagnosis)  Comment: Acute  Plan:   -f/u with ortho PA 12-16 days from surgery [phone]  -Aquacel dressing to stay in place until post-op unless loose or soiled then remove and replace with daily dry dressings  -Weight bear as Tolerated  -PT (medication prior to therapy)  -Tylenol 650 mg po q6H  - mg po BID x 5 weeks  -Dilaudid 2 mg po q4H prn  -Atarax 12.5 mg po q6H prn     (I11.9) Hypertensive heart disease without heart failure  (I48.91) Atrial fibrillation, new onset (H)  Comment: Chronic irregular with A-fib dx  Plan:   -Metoprolol increased to 12.5 mg po BID (will add hold parameters)  -BP and HR BID with medication administration   -[facility] 9/6  -Will forego anticoagulation for a-fib with hx of hemorrhagic stroke and fall risk    (D62) Anemia due to blood loss, acute  Comment: Acute change  Plan:  -Follow hgb post-op. Consider iron supplementation    (R63.8) Decreased oral intake  (F01.50) Vascular dementia without behavioral  disturbance  Comment: Acute with chronic  Plan:  -Supportive cares on unit  -Speech to eval and treat if indicated      Electronically signed by:  HIGINIO Pena CNP                    Sincerely,        HIGINIO Pena CNP

## 2018-08-28 NOTE — PLAN OF CARE
"Problem: Patient Care Overview  Goal: Plan of Care/Patient Progress Review  Outcome: No Change  Vitals: /86 (BP Location: Left leg)  Pulse 91  Temp 97.4  F (36.3  C) (Axillary)  Resp 20  Ht 1.575 m (5' 2.01\")  Wt 49.3 kg (108 lb 9.6 oz)  SpO2 96%  Breastfeeding? No  BMI 19.86 kg/m2  Situation/Status: Pt incontinent of urine, placed Purewick. R hip dressing CDI. Pt refusing anything po. Herminio GREEN for IV meds for pain, BP, abx, and using benadryl and anti itch lotion for itching. NS @ 100ml/h since pt did not eat today.  Pt now on IV Rocephin q24h. Discharge today with HE transport to [facility].    Neuro: Disorientated x 4  Pain: Pt hard time communicating. Pt does say when she hurts when we reposition or change her.Gave pt Dilaudid x2 and ice on hip.   Activity: Assist of 2 with lift.  Diet: Reg diet, pt refusing food. Got her to eat 50% of pudding   Lungs: [id number]-Onwuc  GI/: No nausea/vomiting  Skin: CMS intact, edema, slight swelling to side of affected leg down by lower dressing. Placed pillow between legs.  Dressings: CDI  Lines/drains:  ml/hr  Plan: Continue to monitor per POC.          "

## 2018-08-28 NOTE — PROGRESS NOTES
[place] [facility]  PRIMARY CARE PROVIDER AND CLINIC:  Sofya, Kvngkishan Rivera [address] / [place] [address]  Chief Complaint   Patient presents with     Hospital F/U     [place] Medical Record Number:  [id number]    HPI:    April Donnelly is a 88 year old  (1/30/1930),admitted to the [facility] from [facility].  Hospital stay 08/22/2018 through 08/26/2018.  Admitted to this facility for  rehab, medical management and nursing care.  HPI information obtained from: facility chart records and [facility] chart review.     Hospital Course:  April Donnelly is a 88 year old female with PMH including CVA, hypertension, severe dementia, anxiety who presents with a fall.  She resides at [facility] and had a fall on hardwood floor in the dining area.  She was brought to the ER by medics and here in the ER was noted to be wincing upon examination of her right hip.   Here in the ER basic lab workup was unremarkable.  Imaging was notable for a moderately displaced transverse fracture through the upper neck of the proximal right femur with cephalad displacement of the distal fragment.  EKG here in the emergency room did show atrial fibrillation with rate in the low 100s which is reportedly a new diagnosis.  Orthopedic surgery was contacted and patient was admitted here for further care.  Underwent R bipolar hemiarthroplasty.  Receiving intermittent pain control with acetaminophen, Atarax, and some occasional low-dose Dilaudid.  Unable to really participate with PT.  Extensively discussed with the patient's son regarding overall prognosis is grim if she does not improve any oral intake.  He understands this and if over the next week she cannot maintain hydration they would like to discuss hospice enrollment.  Discharging back to [facility].  Trial of home PT although unsure that she will be able to participate.      Current issues are:        Status post total  replacement of right hip  S/P fall and fracture. She is returning today to memory care. Seen today sitting up in new broda chair, she is calm, pleasant. Conversation is mostly nonsensical.  She is feeding herself and drinking juice from a cup. Does resist touch to right lower extremity. Hip incision is covered with acquacel dressing and unable to visualize. Staff reporting she is refusing to bear weight and will be a 2 person assist with boby for transfers. Notes indicate she was unable to participate in PT post fixation. Would like to coordinate pain medication prior to [facility] PT.    Hypertensive heart disease without heart failure  Atrial fibrillation, new onset (H)  Prior use of Metoprolol 12.5 po daily. BP elevated and discharged on BID dosing. Echo shows preserved EF and normal LV.      Anemia due to blood loss, acute  13.8-->11.0-->9.8. Continue to follow post surgery.    Decreased oral intake  Vascular dementia without behavioral disturbance  Poor oral intake at hospital and family discussion with physician prior to discharge that if no improvement and unable to maintain hydration hospice could be appropriate. Appears with some improvement back in memory care with familiar caregivers and surroundings. No s/s of aspiration and tolerating regular diet. Consider speech to eval.      CODE STATUS/ADVANCE DIRECTIVES DISCUSSION:   DNR / DNI  Patient's living condition: lives in an assisted living facility-memory care    ALLERGIES:Hydrocodone; Lisinopril; and Terazosin  PAST MEDICAL HISTORY:  has a past medical history of Adjustment disorder with mixed anxiety and depressed mood (12/2/2016); Adjustment disorder with mixed anxiety and depressed mood (12/2/2016); Cerebrovascular accident (H) (12/2/2016); CVA (cerebral vascular accident) (H); Hemorrhagic stroke (H) (12/2/2016); Hypertensive heart disease without heart failure (12/2/2016); Severe dementia (12/2/2016); Syncope (12/2/2016); and Thyroid disease.  PAST  SURGICAL HISTORY:  has a past surgical history that includes Thyroidectomy and Open reduction internal fixation hip bipolar (Right, 8/24/2018).  FAMILY HISTORY: Family history is unknown by patient.  SOCIAL HISTORY:  reports that she has never smoked. She has never used smokeless tobacco. She reports that she does not drink alcohol or use illicit drugs.     Post Discharge Medication Reconciliation Status: discharge medications reconciled, continue medications without change.  Current Outpatient Prescriptions   Medication Sig Dispense Refill     acetaminophen (TYLENOL) 325 MG tablet Take 2 tablets (650 mg) by mouth every 6 hours 100 tablet 0     aspirin 325 MG EC tablet Take one Aspirin tab twice daily for 5 weeks. 70 tablet 0     camphor-menthol (DERMASARRA) 0.5-0.5 % LOTN Apply lotion to body daily 1 Bottle 11     citalopram (CELEXA) 10 MG tablet Take 1 tablet (10 mg) by mouth daily 31 tablet 98     HYDROmorphone (DILAUDID) 2 MG tablet Take 1 tablet (2 mg) by mouth every 4 hours as needed for moderate to severe pain 15 tablet 0     hydrOXYzine (ATARAX) 25 MG tablet Take 0.5 tablets (12.5 mg) by mouth every 6 hours as needed 30 tablet 0     levothyroxine (SYNTHROID/LEVOTHROID) 50 MCG tablet TAKE 1 TABLET BY MOUTH ONCE DAILY 31 tablet 97     lidocaine (XYLOCAINE) 5 % ointment Apply topically daily       Menthol-Zinc Oxide (CALMOSEPTINE) 0.44-20.6 % OINT Externally apply 71 g topically 2 times daily APPLY TOPICALLY TO COCCYX TWICE DAILY AND WITH ALL INCONTINENT CHANGES FOR SKIN ITCH AND BREAKDOWN 113 g 11     metoprolol tartrate (LOPRESSOR) 25 MG tablet Take 0.5 tablets (12.5 mg) by mouth 2 times daily 60 tablet 0     Skin Protectants, Misc. (HYDROCERIN) LOTN lotion Apply topically 3 times daily as needed for other (itchy skin) 236 mL 0     [DISCONTINUED] metoprolol tartrate (LOPRESSOR) 25 MG tablet Take 0.5 tablets (12.5 mg) by mouth 2 times daily 16 tablet 98     [DISCONTINUED] metoprolol tartrate (LOPRESSOR) 25 MG  tablet Take 0.5 tablets (12.5 mg) by mouth daily 16 tablet 98     Patient Active Problem List   Diagnosis     Cerebrovascular accident (H)     Hemorrhagic stroke (H)     Syncope     Adjustment disorder with mixed anxiety and depressed mood     Hypertensive heart disease without heart failure     Advance care planning     Closed nondisplaced fracture of pelvis with routine healing, unspecified part of pelvis, subsequent encounter     Vascular dementia without behavioral disturbance     Acquired hypothyroidism     Rash and nonspecific skin eruption     Hemiparesis following cerebrovascular accident (CVA) (H)     Hip fracture (H)     S/P total hip arthroplasty     ROS:  Unobtainable secondary to cognitive impairment. 148    Exam:  /82  Pulse 71  Temp 97.5  F (36.4  C)  Resp 16  SpO2 91%  GENERAL APPEARANCE:  Alert, in no distress, siting in broda chair  ENT:  Mouth and posterior oropharynx normal, dry mucous membranes, [place]  EYES:  EOM, conjunctivae, lids, pupils and irises normal  NECK:  No adenopathy,masses or thyromegaly  RESP:  respiratory effort and palpation of chest normal, lungs grossly clear but diminished   CV:  Palpation and auscultation of heart done , trace edema, rate-elevated, grade 2/6 murmur, irregular rhythm   ABDOMEN:  normal bowel sounds, soft, nontender, hypoactive bowel sounds  M/S:   wheelchair is baseline, EZ Stand for transfers but now use of boby  SKIN: Overall intact few areas with scabs, scarring marks from previous scratching, incision not visualized  NEURO:   Examination of sensation by touch normal  PSYCH:  insight and judgement impaired, memory impaired        Lab/Diagnostic data:    CBC RESULTS:   Recent Labs   Lab Test  08/27/18   0724  08/26/18   0602  08/25/18   0756   08/22/18 2031   WBC   --   9.2   --    --   6.6   RBC   --   3.06*   --    --   4.28   HGB   --   9.8*  11.0*   --   13.8   HCT   --   31.2*   --    --   42.2   MCV   --   102*   --    --   99   MCH   --    32.0   --    --   32.2   MCHC   --   31.4*   --    --   32.7   RDW   --   13.1   --    --   12.8   PLT  191  179   --    < >  259    < > = values in this interval not displayed.       Last Basic Metabolic Panel:  Recent Labs   Lab Test  08/26/18   0602  08/25/18   0756  08/24/18   1701   08/22/18   2031   NA  141   --    --    --   138   POTASSIUM  4.1   --   4.4   < >  Canceled, Test credited   CHLORIDE  112*   --    --    --   108   BOLA  7.9*   --    --    --   8.4*   CO2  22   --    --    --   27   BUN  25   --    --    --   27   CR  0.75   --   0.68   --   0.59   GLC  85  111*   --    --   93    < > = values in this interval not displayed.       TSH   Date Value Ref Range Status   08/23/2018 1.48 0.40 - 4.00 mU/L Final   04/26/2017 1.89 0.40 - 4.00 mU/L Final       ASSESSMENT/PLAN:  (Z96.641) Status post total replacement of right hip  (primary encounter diagnosis)  Comment: Acute  Plan:   -f/u with ortho PA 12-16 days from surgery [phone]  -Aquacel dressing to stay in place until post-op unless loose or soiled then remove and replace with daily dry dressings  -Weight bear as Tolerated  -PT (medication prior to therapy)  -Tylenol 650 mg po q6H  - mg po BID x 5 weeks  -Dilaudid 2 mg po q4H prn  -Atarax 12.5 mg po q6H prn     (I11.9) Hypertensive heart disease without heart failure  (I48.91) Atrial fibrillation, new onset (H)  Comment: Chronic irregular with A-fib dx  Plan:   -Metoprolol increased to 12.5 mg po BID (will add hold parameters)  -BP and HR BID with medication administration   -BMP 9/6  -Will forego anticoagulation for a-fib with hx of hemorrhagic stroke and fall risk    (D62) Anemia due to blood loss, acute  Comment: Acute change  Plan:  -Follow hgb post-op. Consider iron supplementation    (R63.8) Decreased oral intake  (F01.50) Vascular dementia without behavioral disturbance  Comment: Acute with chronic  Plan:  -Supportive cares on unit  -Speech to eval and treat if  indicated      Electronically signed by:  HIGINIO Pena CNP

## 2018-08-30 ENCOUNTER — DOCUMENTATION ONLY (OUTPATIENT)
Dept: CARE COORDINATION | Facility: CLINIC | Age: 83
End: 2018-08-30

## 2018-08-30 NOTE — PROGRESS NOTES
Dear Kvng Cowart,  [employer] regulations requires [facility] to provide an initial assessment visit either within 48 hours of the patient's return home, or on the physician ordered Start of Care date.    There will be a delay in the Initial Assessment for April Donnelly; MRN [id number]  We anticipate the Start of care will be 8/31/18      Sincerely [facility]  Dilcia Shane  [phone]

## 2018-09-04 ENCOUNTER — ASSISTED LIVING VISIT (OUTPATIENT)
Dept: GERIATRICS | Facility: CLINIC | Age: 83
End: 2018-09-04
Payer: MEDICARE

## 2018-09-04 VITALS — DIASTOLIC BLOOD PRESSURE: 70 MMHG | RESPIRATION RATE: 16 BRPM | SYSTOLIC BLOOD PRESSURE: 118 MMHG | HEART RATE: 72 BPM

## 2018-09-04 DIAGNOSIS — Z96.641 STATUS POST TOTAL REPLACEMENT OF RIGHT HIP: Primary | ICD-10-CM

## 2018-09-04 DIAGNOSIS — K59.01 SLOW TRANSIT CONSTIPATION: ICD-10-CM

## 2018-09-04 DIAGNOSIS — R62.7 ADULT FAILURE TO THRIVE: ICD-10-CM

## 2018-09-04 RX ORDER — AMOXICILLIN 250 MG
1 CAPSULE ORAL 2 TIMES DAILY
COMMUNITY
End: 2018-10-25

## 2018-09-04 NOTE — PROGRESS NOTES
[place] [facility]    Chief Complaint   Patient presents with     group home Acute       [place] Medical Record Number:  [id number]    HPI:    April Donnelly is a 88 year old  (1/30/1930), who is being seen today for an episodic care visit at [facility].  HPI information obtained from: facility chart records.Today's concern is:    Status post total replacement of right hip  Seen today resting in bed. Aquacel dressing not in place but has Mepilex and appears she has been attempting to scratch at incision line. Some slight redness noted to distal incision at. No drainage. No prn dilaudid used over the weekend but will need prior to therapy. Repositioned w/o indications of pain. Tylenol scheduled q6H.    Slow transit constipation  Concern with narcotic use. Last reported BM on 9/2. Abdomen is soft nontender.     Failure to thrive  Family reporting she was not eating or drinking much over the weekend. Staff reports she ate some at breakfast but only drinking at lunch. Refusing pills at times additionally.    ALLERGIES: Hydrocodone; Lisinopril; and Terazosin  Past Medical, Surgical, Family and Social History reviewed and updated in [facility].    Current Outpatient Prescriptions   Medication Sig Dispense Refill     acetaminophen (TYLENOL) 325 MG tablet Take 2 tablets (650 mg) by mouth every 6 hours 100 tablet 0     aspirin 325 MG EC tablet Take one Aspirin tab twice daily for 5 weeks. 70 tablet 0     camphor-menthol (DERMASARRA) 0.5-0.5 % LOTN Apply lotion to body daily 1 Bottle 11     citalopram (CELEXA) 10 MG tablet Take 1 tablet (10 mg) by mouth daily 31 tablet 98     HYDROmorphone (DILAUDID) 2 MG tablet Take 1 tablet (2 mg) by mouth every 4 hours as needed for moderate to severe pain 15 tablet 0     hydrOXYzine (ATARAX) 25 MG tablet Take 0.5 tablets (12.5 mg) by mouth every 6 hours as needed 30 tablet 0     levothyroxine (SYNTHROID/LEVOTHROID) 50 MCG tablet TAKE 1 TABLET BY  MOUTH ONCE DAILY 31 tablet 97     lidocaine (XYLOCAINE) 5 % ointment Apply topically daily       Menthol-Zinc Oxide (CALMOSEPTINE) 0.44-20.6 % OINT Externally apply 71 g topically 2 times daily APPLY TOPICALLY TO COCCYX TWICE DAILY AND WITH ALL INCONTINENT CHANGES FOR SKIN ITCH AND BREAKDOWN 113 g 11     metoprolol tartrate (LOPRESSOR) 25 MG tablet Take 0.5 tablets (12.5 mg) by mouth 2 times daily 60 tablet 0     Skin Protectants, Misc. (HYDROCERIN) LOTN lotion Apply topically 3 times daily as needed for other (itchy skin) 236 mL 0     Patient Active Problem List   Diagnosis     Cerebrovascular accident (H)     Hemorrhagic stroke (H)     Syncope     Adjustment disorder with mixed anxiety and depressed mood     Hypertensive heart disease without heart failure     Advance care planning     Closed nondisplaced fracture of pelvis with routine healing, unspecified part of pelvis, subsequent encounter     Vascular dementia without behavioral disturbance     Acquired hypothyroidism     Rash and nonspecific skin eruption     Hemiparesis following cerebrovascular accident (CVA) (H)     Hip fracture (H)     S/P total hip arthroplasty       Medications reviewed:  Medications reconciled to facility chart and changes were made to reflect current medications as identified as above med list. Below are the changes that were made:   Medications stopped since last EPIC medication reconciliation:   There are no discontinued medications.    Medications started since last [facility] medication reconciliation:  No orders of the defined types were placed in this encounter.    REVIEW OF SYSTEMS:  Unobtainable secondary to cognitive impairment.     Physical Exam:  /70  Pulse 72  Resp 16  GENERAL APPEARANCE:  Alert, in no distress, resting in bed  ENT:  Mouth and posterior oropharynx normal, dry mucous membranes, [place]  EYES:  EOM, conjunctivae, lids, pupils and irises normal  NECK:  No adenopathy,masses or thyromegaly  RESP:  respiratory  effort and palpation of chest normal, lungs grossly clear but diminished   CV:  Palpation and auscultation of heart done , trace edema, rate-elevated, grade 2/6 murmur, irregular rhythm, regular rate  ABDOMEN:  normal bowel sounds, soft, nontender, hypoactive bowel sounds  M/S:   wheelchair is baseline, EZ Stand for transfers but now use of boby  SKIN: Overall intact few areas with scabs, scarring marks from previous scratching, incision slight redness at distal end on incision line- no warmth,   NEURO:   Examination of sensation by touch normal  PSYCH:  insight and judgement impaired, memory impaired     Recent Labs:     CBC RESULTS:   Recent Labs   Lab Test  08/27/18   0724  08/26/18   0602  08/25/18   0756   08/22/18 2031   WBC   --   9.2   --    --   6.6   RBC   --   3.06*   --    --   4.28   HGB   --   9.8*  11.0*   --   13.8   HCT   --   31.2*   --    --   42.2   MCV   --   102*   --    --   99   MCH   --   32.0   --    --   32.2   MCHC   --   31.4*   --    --   32.7   RDW   --   13.1   --    --   12.8   PLT  191  179   --    < >  259    < > = values in this interval not displayed.       Last Basic Metabolic Panel:  Recent Labs   Lab Test  08/26/18   0602  08/25/18   0756  08/24/18   1701   08/22/18 2031   NA  141   --    --    --   138   POTASSIUM  4.1   --   4.4   < >  Canceled, Test credited   CHLORIDE  112*   --    --    --   108   BOLA  7.9*   --    --    --   8.4*   CO2  22   --    --    --   27   BUN  25   --    --    --   27   CR  0.75   --   0.68   --   0.59   GLC  85  111*   --    --   93    < > = values in this interval not displayed.       TSH   Date Value Ref Range Status   08/23/2018 1.48 0.40 - 4.00 mU/L Final   04/26/2017 1.89 0.40 - 4.00 mU/L Final       Assessment/Plan:  (Z96.641) Status post total replacement of right hip  (primary encounter diagnosis)  Comment: Acute  Plan:   -[facility] to follow  -Incision removed by homecare. Ortho PA will follow onsite  -Daily dressing changes as no  aquacel in place  -Monitor for redness, swelling, warmth at incision   -Weight bear as Tolerated  -PT (medication prior to therapy)  -Tylenol 650 mg po q6H  - mg po BID x 5 weeks  -Dilaudid 2 mg po q4H prn  -Atarax 12.5 mg po q6H prn (will schedule dose at HS)    (K59.01) Slow transit constipation  Comment: Concern with narcotic use  Plan:   -Senna S I tablet bid and hold for loose stools.    (R62.7) Adult failure to thrive  Comment: Ongoing  Plan:  -Refusing food and pills at time  -Staff assist at meals  -Encourage po intake. Small frequent amounts if needed        Electronically signed by  HIGINIO Pena CNP

## 2018-09-04 NOTE — LETTER
9/4/2018        RE: April Donnelly  [facility]  [address] [address]        [place] [facility]    Chief Complaint   Patient presents with     correction Acute       [place] Medical Record Number:  [id number]    HPI:    April Donnelly is a 88 year old  (1/30/1930), who is being seen today for an episodic care visit at [facility].  HPI information obtained from: facility chart records.Today's concern is:    Status post total replacement of right hip  Seen today resting in bed. Aquacel dressing not in place but has Mepilex and appears she has been attempting to scratch at incision line. Some slight redness noted to distal incision at. No drainage. No prn dilaudid used over the weekend but will need prior to therapy. Repositioned w/o indications of pain. Tylenol scheduled q6H.    Slow transit constipation  Concern with narcotic use. Last reported BM on 9/2. Abdomen is soft nontender.     Failure to thrive  Family reporting she was not eating or drinking much over the weekend. Staff reports she ate some at breakfast but only drinking at lunch. Refusing pills at times additionally.    ALLERGIES: Hydrocodone; Lisinopril; and Terazosin  Past Medical, Surgical, Family and Social History reviewed and updated in [facility].    Current Outpatient Prescriptions   Medication Sig Dispense Refill     acetaminophen (TYLENOL) 325 MG tablet Take 2 tablets (650 mg) by mouth every 6 hours 100 tablet 0     aspirin 325 MG EC tablet Take one Aspirin tab twice daily for 5 weeks. 70 tablet 0     camphor-menthol (DERMASARRA) 0.5-0.5 % LOTN Apply lotion to body daily 1 Bottle 11     citalopram (CELEXA) 10 MG tablet Take 1 tablet (10 mg) by mouth daily 31 tablet 98     HYDROmorphone (DILAUDID) 2 MG tablet Take 1 tablet (2 mg) by mouth every 4 hours as needed for moderate to severe pain 15 tablet 0     hydrOXYzine (ATARAX) 25 MG tablet Take 0.5 tablets (12.5 mg)  by mouth every 6 hours as needed 30 tablet 0     levothyroxine (SYNTHROID/LEVOTHROID) 50 MCG tablet TAKE 1 TABLET BY MOUTH ONCE DAILY 31 tablet 97     lidocaine (XYLOCAINE) 5 % ointment Apply topically daily       Menthol-Zinc Oxide (CALMOSEPTINE) 0.44-20.6 % OINT Externally apply 71 g topically 2 times daily APPLY TOPICALLY TO COCCYX TWICE DAILY AND WITH ALL INCONTINENT CHANGES FOR SKIN ITCH AND BREAKDOWN 113 g 11     metoprolol tartrate (LOPRESSOR) 25 MG tablet Take 0.5 tablets (12.5 mg) by mouth 2 times daily 60 tablet 0     Skin Protectants, Misc. (HYDROCERIN) LOTN lotion Apply topically 3 times daily as needed for other (itchy skin) 236 mL 0     Patient Active Problem List   Diagnosis     Cerebrovascular accident (H)     Hemorrhagic stroke (H)     Syncope     Adjustment disorder with mixed anxiety and depressed mood     Hypertensive heart disease without heart failure     Advance care planning     Closed nondisplaced fracture of pelvis with routine healing, unspecified part of pelvis, subsequent encounter     Vascular dementia without behavioral disturbance     Acquired hypothyroidism     Rash and nonspecific skin eruption     Hemiparesis following cerebrovascular accident (CVA) (H)     Hip fracture (H)     S/P total hip arthroplasty       Medications reviewed:  Medications reconciled to facility chart and changes were made to reflect current medications as identified as above med list. Below are the changes that were made:   Medications stopped since last EPIC medication reconciliation:   There are no discontinued medications.    Medications started since last [facility] medication reconciliation:  No orders of the defined types were placed in this encounter.    REVIEW OF SYSTEMS:  Unobtainable secondary to cognitive impairment.     Physical Exam:  /70  Pulse 72  Resp 16  GENERAL APPEARANCE:  Alert, in no distress, resting in bed  ENT:  Mouth and posterior oropharynx normal, dry mucous membranes, [place]  EYES:   EOM, conjunctivae, lids, pupils and irises normal  NECK:  No adenopathy,masses or thyromegaly  RESP:  respiratory effort and palpation of chest normal, lungs grossly clear but diminished   CV:  Palpation and auscultation of heart done , trace edema, rate-elevated, grade 2/6 murmur, irregular rhythm, regular rate  ABDOMEN:  normal bowel sounds, soft, nontender, hypoactive bowel sounds  M/S:   wheelchair is baseline, EZ Stand for transfers but now use of boby  SKIN: Overall intact few areas with scabs, scarring marks from previous scratching, incision slight redness at distal end on incision line- no warmth,   NEURO:   Examination of sensation by touch normal  PSYCH:  insight and judgement impaired, memory impaired     Recent Labs:     CBC RESULTS:   Recent Labs   Lab Test  08/27/18   0724  08/26/18   0602  08/25/18   0756   08/22/18 2031   WBC   --   9.2   --    --   6.6   RBC   --   3.06*   --    --   4.28   HGB   --   9.8*  11.0*   --   13.8   HCT   --   31.2*   --    --   42.2   MCV   --   102*   --    --   99   MCH   --   32.0   --    --   32.2   MCHC   --   31.4*   --    --   32.7   RDW   --   13.1   --    --   12.8   PLT  191  179   --    < >  259    < > = values in this interval not displayed.       Last Basic Metabolic Panel:  Recent Labs   Lab Test  08/26/18   0602  08/25/18   0756  08/24/18   1701   08/22/18 2031   NA  141   --    --    --   138   POTASSIUM  4.1   --   4.4   < >  Canceled, Test credited   CHLORIDE  112*   --    --    --   108   BOLA  7.9*   --    --    --   8.4*   CO2  22   --    --    --   27   BUN  25   --    --    --   27   CR  0.75   --   0.68   --   0.59   GLC  85  111*   --    --   93    < > = values in this interval not displayed.       TSH   Date Value Ref Range Status   08/23/2018 1.48 0.40 - 4.00 mU/L Final   04/26/2017 1.89 0.40 - 4.00 mU/L Final       Assessment/Plan:  (Z96.641) Status post total replacement of right hip  (primary encounter diagnosis)  Comment: Acute  Plan:    -[facility] to follow  -Incision removed by homecare. Ortho PA will follow onsite  -Daily dressing changes as no aquacel in place  -Monitor for redness, swelling, warmth at incision   -Weight bear as Tolerated  -PT (medication prior to therapy)  -Tylenol 650 mg po q6H  - mg po BID x 5 weeks  -Dilaudid 2 mg po q4H prn  -Atarax 12.5 mg po q6H prn  (will schedule dose at HS)    (K59.01) Slow transit constipation  Comment: Concern with narcotic use  Plan:   -Senna S I tablet bid and hold for loose stools.    (R62.7) Adult failure to thrive  Comment: Ongoing  Plan:  -Refusing food and pills at time  -Staff assist at meals  -Encourage po intake. Small frequent amounts if needed        Electronically signed by  HIGINIO Pena CNP                      Sincerely,        HIGINIO Pena CNP

## 2018-09-10 LAB
ANION GAP SERPL CALCULATED.3IONS-SCNC: 7 MMOL/L (ref 3–14)
BASOPHILS # BLD AUTO: 0 10E9/L (ref 0–0.2)
BASOPHILS NFR BLD AUTO: 0.5 %
BUN SERPL-MCNC: 21 MG/DL (ref 7–30)
CALCIUM SERPL-MCNC: 9 MG/DL (ref 8.5–10.1)
CHLORIDE SERPL-SCNC: 106 MMOL/L (ref 94–109)
CO2 SERPL-SCNC: 26 MMOL/L (ref 20–32)
CREAT SERPL-MCNC: 0.66 MG/DL (ref 0.52–1.04)
DIFFERENTIAL METHOD BLD: ABNORMAL
EOSINOPHIL # BLD AUTO: 0.5 10E9/L (ref 0–0.7)
EOSINOPHIL NFR BLD AUTO: 8.1 %
ERYTHROCYTE [DISTWIDTH] IN BLOOD BY AUTOMATED COUNT: 13.9 % (ref 10–15)
GFR SERPL CREATININE-BSD FRML MDRD: 84 ML/MIN/1.7M2
GLUCOSE SERPL-MCNC: 94 MG/DL (ref 70–99)
HCT VFR BLD AUTO: 38.7 % (ref 35–47)
HGB BLD-MCNC: 12.1 G/DL (ref 11.7–15.7)
IMM GRANULOCYTES # BLD: 0 10E9/L (ref 0–0.4)
IMM GRANULOCYTES NFR BLD: 0.2 %
LYMPHOCYTES # BLD AUTO: 1.2 10E9/L (ref 0.8–5.3)
LYMPHOCYTES NFR BLD AUTO: 18.5 %
MCH RBC QN AUTO: 30.7 PG (ref 26.5–33)
MCHC RBC AUTO-ENTMCNC: 31.3 G/DL (ref 31.5–36.5)
MCV RBC AUTO: 98 FL (ref 78–100)
MONOCYTES # BLD AUTO: 0.5 10E9/L (ref 0–1.3)
MONOCYTES NFR BLD AUTO: 7.9 %
NEUTROPHILS # BLD AUTO: 4.3 10E9/L (ref 1.6–8.3)
NEUTROPHILS NFR BLD AUTO: 64.8 %
NRBC # BLD AUTO: 0 10*3/UL
NRBC BLD AUTO-RTO: 0 /100
PLATELET # BLD AUTO: 359 10E9/L (ref 150–450)
POTASSIUM SERPL-SCNC: 4.3 MMOL/L (ref 3.4–5.3)
RBC # BLD AUTO: 3.94 10E12/L (ref 3.8–5.2)
SODIUM SERPL-SCNC: 139 MMOL/L (ref 133–144)
WBC # BLD AUTO: 6.6 10E9/L (ref 4–11)

## 2018-09-10 PROCEDURE — 85025 COMPLETE CBC W/AUTO DIFF WBC: CPT | Performed by: INTERNAL MEDICINE

## 2018-09-10 PROCEDURE — 80048 BASIC METABOLIC PNL TOTAL CA: CPT | Performed by: INTERNAL MEDICINE

## 2018-09-25 ENCOUNTER — ASSISTED LIVING VISIT (OUTPATIENT)
Dept: GERIATRICS | Facility: CLINIC | Age: 83
End: 2018-09-25
Payer: MEDICARE

## 2018-09-25 DIAGNOSIS — Z96.641 STATUS POST TOTAL REPLACEMENT OF RIGHT HIP: Primary | ICD-10-CM

## 2018-09-25 DIAGNOSIS — R63.4 LOSS OF WEIGHT: ICD-10-CM

## 2018-09-25 DIAGNOSIS — K59.00 CONSTIPATION, UNSPECIFIED CONSTIPATION TYPE: ICD-10-CM

## 2018-09-25 NOTE — PROGRESS NOTES
[place] [facility]    Chief Complaint   Patient presents with     [facility] Acute       [place] Medical Record Number:  [id number]    HPI:    April Donnelly is a 88 year old  (1/30/1930), who is being seen today for an episodic care visit at [facility].  HPI information obtained from: facility chart records.Today's concern is:    Status post total replacement of right hip  Incision is healed. Continues with boby transfers. Fall mat next to bed. No verbal non-verbal indicators of pain with exam. Trace swelling to extremity.    Constipation, unspecified constipation type  History of condition and now s/p surgery. She is not taking any narcotics but is no longer bearing weight for transfers. Poor oral intake could be contributor to no stool.     Loss of weight  Post surgery with poor oral intake. She is refusing medications/meals. With feeding assistance today did accept modified texture diet and liquids. No cough post intake. Medications changed to liquid form and continues to refuse. Weight down 11% in chart review.      ALLERGIES: Hydrocodone; Lisinopril; and Terazosin  Past Medical, Surgical, Family and Social History reviewed and updated in [facility].    Current Outpatient Prescriptions   Medication Sig Dispense Refill     acetaminophen (TYLENOL) 325 MG tablet Take 2 tablets (650 mg) by mouth every 6 hours 100 tablet 0     aspirin 325 MG EC tablet Take one Aspirin tab twice daily for 5 weeks. 70 tablet 0     camphor-menthol (DERMASARRA) 0.5-0.5 % LOTN Apply lotion to body daily 1 Bottle 11     docusate (COLACE) 50 MG/5ML liquid Take 50 mg by mouth daily       levothyroxine (SYNTHROID/LEVOTHROID) 50 MCG tablet TAKE 1 TABLET BY MOUTH ONCE DAILY 31 tablet 97     lidocaine (XYLOCAINE) 5 % ointment Apply topically daily       Menthol-Zinc Oxide (CALMOSEPTINE) 0.44-20.6 % OINT Externally apply 71 g topically 2 times daily APPLY TOPICALLY TO COCCYX TWICE DAILY AND WITH ALL  INCONTINENT CHANGES FOR SKIN ITCH AND BREAKDOWN 113 g 11     metoprolol tartrate (LOPRESSOR) 25 MG tablet Take 0.5 tablets (12.5 mg) by mouth 2 times daily 60 tablet 0     senna-docusate (SENOKOT-S;PERICOLACE) 8.6-50 MG per tablet Take 1 tablet by mouth 2 times daily Hold for loose stools.       Patient Active Problem List   Diagnosis     Cerebrovascular accident (H)     Hemorrhagic stroke (H)     Syncope     Adjustment disorder with mixed anxiety and depressed mood     Hypertensive heart disease without heart failure     Advance care planning     Closed nondisplaced fracture of pelvis with routine healing, unspecified part of pelvis, subsequent encounter     Vascular dementia without behavioral disturbance     Acquired hypothyroidism     Rash and nonspecific skin eruption     Hemiparesis following cerebrovascular accident (CVA) (H)     Hip fracture (H)     S/P total hip arthroplasty       Medications reviewed:  Medications reconciled to facility chart and changes were made to reflect current medications as identified as above med list. Below are the changes that were made:   Medications stopped since last EPIC medication reconciliation:   Medications Discontinued During This Encounter   Medication Reason     Skin Protectants, Misc. (HYDROCERIN) LOTN lotion Discontinued by another Health Care Provider     HYDROmorphone (DILAUDID) 2 MG tablet Discontinued by another Health Care Provider     citalopram (CELEXA) 10 MG tablet Discontinued by another Health Care Provider     hydrOXYzine (ATARAX) 25 MG tablet Discontinued by another Health Care Provider       Medications started since last [facility] medication reconciliation:  Orders Placed This Encounter   Medications     docusate (COLACE) 50 MG/5ML liquid     Sig: Take 50 mg by mouth daily       REVIEW OF SYSTEMS:  Unobtainable secondary to cognitive impairment.     Physical Exam:  /70  Pulse 71  Temp 97.7  F (36.5  C)  Resp 16  Wt 96 lb (43.5 kg)  SpO2 97%  BMI  17.55 kg/m2  GENERAL APPEARANCE:  Alert, in no distress, resting in broda chair  ENT:  Mouth and posterior oropharynx normal, dry mucous membranes, [place]  EYES:  EOM, conjunctivae, lids, pupils and irises normal  NECK:  No adenopathy,masses or thyromegaly  RESP:  respiratory effort and palpation of chest normal, lungs grossly clear but diminished   CV:  Palpation and auscultation of heart done , trace edema, rate-elevated, grade 2/6 murmur, irregular rhythm, regular rate  ABDOMEN: soft, nontender, hypoactive bowel sounds  M/S:   wheelchair is baseline, EZ Stand for transfers but now use of boby  SKIN: Overall intact few areas with scabs, scarring marks from previous scratching  NEURO:   Examination of sensation by touch normal  PSYCH:  insight and judgement impaired, memory impaired     Recent Labs:       Recent Labs   CBC RESULTS:   Recent Labs   Lab Test  09/10/18   1425  08/27/18   0724  08/26/18   0602   WBC  6.6   --   9.2   RBC  3.94   --   3.06*   HGB  12.1   --   9.8*   HCT  38.7   --   31.2*   MCV  98   --   102*   MCH  30.7   --   32.0   MCHC  31.3*   --   31.4*   RDW  13.9   --   13.1   PLT  359  191  179       Last Basic Metabolic Panel:  Recent Labs   Lab Test  09/10/18   1425  08/26/18   0602   NA  139  141   POTASSIUM  4.3  4.1   CHLORIDE  106  112*   BOLA  9.0  7.9*   CO2  26  22   BUN  21  25   CR  0.66  0.75   GLC  94  85       Liver Function Studies -   Recent Labs   Lab Test 04/26/17 12/05/16   PROTTOTAL  6.2*  6.5   ALBUMIN  3.1*  3.2   BILITOTAL  0.4  0.6   ALKPHOS  123  143   AST  22  22   ALT  18  15       TSH   Date Value Ref Range Status   08/23/2018 1.48 0.40 - 4.00 mU/L Final   04/26/2017 1.89 0.40 - 4.00 mU/L Final     No results found for: A1C    Assessment/Plan:  (Z96.641) Status post total replacement of right hip  (primary encounter diagnosis)  Comment: Healing  Plan:   -No ortho PA follow up needed to date  -WBAT  -Refusing ASA with other medications    (K59.00) Constipation,  unspecified constipation type  Comment: Hx of with no recorded bowel movement since 9/20  Plan:   -Digital rectal per nursing  -Will order suppository if positive for stool as refusing most orals    (R63.4) Loss of weight  Comment: Ongoing  Plan:   -Diet to modified soft and nectar thick trial  -Staff to reproach if she refuses and assist with feeding  -Could be evaluated for hospice given loss of weight        Electronically signed by  HIGINIO Pena CNP

## 2018-09-25 NOTE — LETTER
9/25/2018        RE: April Donnelly  [facility]  [address] [address] [facility] [address]        [place] [facility]    Chief Complaint   Patient presents with     FCI Acute       [place] Medical Record Number:  [id number]    HPI:    April Donnelly is a 88 year old  (1/30/1930), who is being seen today for an episodic care visit at [facility].  HPI information obtained from: facility chart records.Today's concern is:    Status post total replacement of right hip  Incision is healed. Continues with boby transfers. Fall mat next to bed. No verbal non-verbal indicators of pain with exam. Trace swelling to extremity.    Constipation, unspecified constipation type  History of condition and now s/p surgery. She is not taking any narcotics but is no longer bearing weight for transfers. Poor oral intake could be contributor to no stool.     Loss of weight  Post surgery with poor oral intake. She is refusing medications/meals. With feeding assistance today did accept modified texture diet and liquids. No cough post intake. Medications changed to liquid form and continues to refuse. Weight down 11% in chart review.      ALLERGIES: Hydrocodone; Lisinopril; and Terazosin  Past Medical, Surgical, Family and Social History reviewed and updated in [employer].    Current Outpatient Prescriptions   Medication Sig Dispense Refill     acetaminophen (TYLENOL) 325 MG tablet Take 2 tablets (650 mg) by mouth every 6 hours 100 tablet 0     aspirin 325 MG EC tablet Take one Aspirin tab twice daily for 5 weeks. 70 tablet 0     camphor-menthol (DERMASARRA) 0.5-0.5 % LOTN Apply lotion to body daily 1 Bottle 11     docusate (COLACE) 50 MG/5ML liquid Take 50 mg by mouth daily       levothyroxine (SYNTHROID/LEVOTHROID) 50 MCG tablet TAKE 1 TABLET BY MOUTH ONCE DAILY 31 tablet 97     lidocaine (XYLOCAINE) 5 % ointment Apply topically daily       Menthol-Zinc Oxide (CALMOSEPTINE)  0.44-20.6 % OINT Externally apply 71 g topically 2 times daily APPLY TOPICALLY TO COCCYX TWICE DAILY AND WITH ALL INCONTINENT CHANGES FOR SKIN ITCH AND BREAKDOWN 113 g 11     metoprolol tartrate (LOPRESSOR) 25 MG tablet Take 0.5 tablets (12.5 mg) by mouth 2 times daily 60 tablet 0     senna-docusate (SENOKOT-S;PERICOLACE) 8.6-50 MG per tablet Take 1 tablet by mouth 2 times daily Hold for loose stools.       Patient Active Problem List   Diagnosis     Cerebrovascular accident (H)     Hemorrhagic stroke (H)     Syncope     Adjustment disorder with mixed anxiety and depressed mood     Hypertensive heart disease without heart failure     Advance care planning     Closed nondisplaced fracture of pelvis with routine healing, unspecified part of pelvis, subsequent encounter     Vascular dementia without behavioral disturbance     Acquired hypothyroidism     Rash and nonspecific skin eruption     Hemiparesis following cerebrovascular accident (CVA) (H)     Hip fracture (H)     S/P total hip arthroplasty       Medications reviewed:  Medications reconciled to facility chart and changes were made to reflect current medications as identified as above med list. Below are the changes that were made:   Medications stopped since last EPIC medication reconciliation:   Medications Discontinued During This Encounter   Medication Reason     Skin Protectants, Misc. (HYDROCERIN) LOTN lotion Discontinued by another Health Care Provider     HYDROmorphone (DILAUDID) 2 MG tablet Discontinued by another Health Care Provider     citalopram (CELEXA) 10 MG tablet Discontinued by another Health Care Provider     hydrOXYzine (ATARAX) 25 MG tablet Discontinued by another Health Care Provider       Medications started since last [facility] medication reconciliation:  Orders Placed This Encounter   Medications     docusate (COLACE) 50 MG/5ML liquid     Sig: Take 50 mg by mouth daily       REVIEW OF SYSTEMS:  Unobtainable secondary to cognitive impairment.      Physical Exam:  /70  Pulse 71  Temp 97.7  F (36.5  C)  Resp 16  Wt 96 lb (43.5 kg)  SpO2 97%  BMI 17.55 kg/m2  GENERAL APPEARANCE:  Alert, in no distress, resting in broda chair  ENT:  Mouth and posterior oropharynx normal, dry mucous membranes, [place]  EYES:  EOM, conjunctivae, lids, pupils and irises normal  NECK:  No adenopathy,masses or thyromegaly  RESP:  respiratory effort and palpation of chest normal, lungs grossly clear but diminished   CV:  Palpation and auscultation of heart done , trace edema, rate-elevated, grade 2/6 murmur, irregular rhythm, regular rate  ABDOMEN: soft, nontender, hypoactive bowel sounds  M/S:   wheelchair is baseline, EZ Stand for transfers but now use of boby  SKIN: Overall intact few areas with scabs, scarring marks from previous scratching  NEURO:   Examination of sensation by touch normal  PSYCH:  insight and judgement impaired, memory impaired     Recent Labs:       Recent Labs   CBC RESULTS:   Recent Labs   Lab Test  09/10/18   1425  08/27/18   0724  08/26/18   0602   WBC  6.6   --   9.2   RBC  3.94   --   3.06*   HGB  12.1   --   9.8*   HCT  38.7   --   31.2*   MCV  98   --   102*   MCH  30.7   --   32.0   MCHC  31.3*   --   31.4*   RDW  13.9   --   13.1   PLT  359  191  179       Last Basic Metabolic Panel:  Recent Labs   Lab Test  09/10/18   1425  08/26/18   0602   NA  139  141   POTASSIUM  4.3  4.1   CHLORIDE  106  112*   BOLA  9.0  7.9*   CO2  26  22   BUN  21  25   CR  0.66  0.75   GLC  94  85       Liver Function Studies -   Recent Labs   Lab Test 04/26/17 12/05/16   PROTTOTAL  6.2*  6.5   ALBUMIN  3.1*  3.2   BILITOTAL  0.4  0.6   ALKPHOS  123  143   AST  22  22   ALT  18  15       TSH   Date Value Ref Range Status   08/23/2018 1.48 0.40 - 4.00 mU/L Final   04/26/2017 1.89 0.40 - 4.00 mU/L Final     No results found for: A1C    Assessment/Plan:  (Z96.641) Status post total replacement of right hip  (primary encounter diagnosis)  Comment: Healing  Plan:   -No  ortho PA follow up needed to date  -WBAT  -Refusing ASA with other medications    (K59.00) Constipation, unspecified constipation type  Comment: Hx of with no recorded bowel movement since 9/20  Plan:   -Digital rectal per nursing  -Will order suppository if positive for stool as refusing most orals    (R63.4) Loss of weight  Comment: Ongoing  Plan:   -Diet to modified soft and nectar thick trial  -Staff to reproach if she refuses and assist with feeding  -Could be evaluated for hospice given loss of weight        Electronically signed by  HIGINIO Pena CNP                      Sincerely,        HIGINIO Pena CNP

## 2018-09-26 VITALS
BODY MASS INDEX: 17.55 KG/M2 | HEART RATE: 71 BPM | OXYGEN SATURATION: 97 % | RESPIRATION RATE: 16 BRPM | TEMPERATURE: 97.7 F | WEIGHT: 96 LBS | SYSTOLIC BLOOD PRESSURE: 112 MMHG | DIASTOLIC BLOOD PRESSURE: 70 MMHG

## 2018-10-25 ENCOUNTER — ASSISTED LIVING VISIT (OUTPATIENT)
Dept: GERIATRICS | Facility: CLINIC | Age: 83
End: 2018-10-25
Payer: MEDICARE

## 2018-10-25 VITALS
TEMPERATURE: 97.9 F | OXYGEN SATURATION: 99 % | RESPIRATION RATE: 16 BRPM | SYSTOLIC BLOOD PRESSURE: 129 MMHG | WEIGHT: 104.5 LBS | BODY MASS INDEX: 19.11 KG/M2 | DIASTOLIC BLOOD PRESSURE: 79 MMHG | HEART RATE: 79 BPM

## 2018-10-25 DIAGNOSIS — H57.89 EYE IRRITATION: Primary | ICD-10-CM

## 2018-10-25 NOTE — PROGRESS NOTES
[place] [facility]    Chief Complaint   Patient presents with     PARVIZ       [place] Medical Record Number:  [id number]  Place of Service where encounter took place:  [facility] [[id number]]    HPI:    April Donnelly is a 88 year old  (1/30/1930), who is being seen today for an episodic care visit.  HPI information obtained from: facility chart records.Today's concern is:    Eye irritation  First noticed by staff last week, reddened lids of left eye no redness of sclera but does have some yellowish drainage. This continues despite warm compress alternated with cold packs. Today dried yellow drainage in left lacrimal, no non-verbal indications of itch or pain but family believes she may be trying to scratch at it. Memory care resident and unable to provide any history.    ALLERGIES: Hydrocodone; Lisinopril; and Terazosin  Past Medical, Surgical, Family and Social History reviewed and updated in [employer].    Current Outpatient Prescriptions   Medication Sig Dispense Refill     acetaminophen (TYLENOL) 32 mg/mL solution 20.3 mL twice daily at 9am and 6pm AND 20.3 mL daily as needed       camphor-menthol (DERMASARRA) 0.5-0.5 % LOTN Apply lotion to body daily 1 Bottle 11     docusate (COLACE) 50 MG/5ML liquid 5 mL by mouth twice daily at 9am and 6pm       levothyroxine (SYNTHROID/LEVOTHROID) 50 MCG tablet TAKE 1 TABLET BY MOUTH ONCE DAILY 31 tablet 97     lidocaine (XYLOCAINE) 5 % ointment Apply topically daily       Menthol-Zinc Oxide (CALMOSEPTINE) 0.44-20.6 % OINT Externally apply 71 g topically 2 times daily APPLY TOPICALLY TO COCCYX TWICE DAILY AND WITH ALL INCONTINENT CHANGES FOR SKIN ITCH AND BREAKDOWN 113 g 11     metoprolol (LOPRESSOR) 10 mg/mL SUSP 1.25 mL twice daily at 9am and 6pm       sennosides (SENOKOT) 8.8 MG/5ML syrup Take 5 mLs by mouth 2 times daily       Skin Protectants, Misc. (EUCERIN) cream Apply topically 3 times daily as needed for dry skin       [DISCONTINUED]  metoprolol tartrate (LOPRESSOR) 25 MG tablet Take 0.5 tablets (12.5 mg) by mouth 2 times daily (Patient not taking: Reported on 10/25/2018) 60 tablet 0     Patient Active Problem List   Diagnosis     Cerebrovascular accident (H)     Hemorrhagic stroke (H)     Syncope     Adjustment disorder with mixed anxiety and depressed mood     Hypertensive heart disease without heart failure     Advance care planning     Closed nondisplaced fracture of pelvis with routine healing, unspecified part of pelvis, subsequent encounter     Vascular dementia without behavioral disturbance     Acquired hypothyroidism     Rash and nonspecific skin eruption     Hemiparesis following cerebrovascular accident (CVA) (H)     Hip fracture (H)     S/P total hip arthroplasty       Medications reviewed:  Medications reconciled to facility chart and changes were made to reflect current medications as identified as above med list. Below are the changes that were made:   Medications stopped since last EPIC medication reconciliation:   Medications Discontinued During This Encounter   Medication Reason     acetaminophen (TYLENOL) 325 MG tablet Discontinued by another Health Care Provider     aspirin 325 MG EC tablet Discontinued by another Health Care Provider     docusate (COLACE) 50 MG/5ML liquid Discontinued by another Health Care Provider     metoprolol tartrate (LOPRESSOR) 25 MG tablet Discontinued by another Health Care Provider     senna-docusate (SENOKOT-S;PERICOLACE) 8.6-50 MG per tablet Discontinued by another Health Care Provider       Medications started since last [facility] medication reconciliation:  Orders Placed This Encounter   Medications     acetaminophen (TYLENOL) 32 mg/mL solution     Si.3 mL twice daily at 9am and 6pm AND 20.3 mL daily as needed     docusate (COLACE) 50 MG/5ML liquid     Si mL by mouth twice daily at 9am and 6pm     Skin Protectants, Misc. (EUCERIN) cream     Sig: Apply topically 3 times daily as needed for  dry skin     metoprolol (LOPRESSOR) 10 mg/mL SUSP     Si.25 mL twice daily at 9am and 6pm     sennosides (SENOKOT) 8.8 MG/5ML syrup     Sig: Take 5 mLs by mouth 2 times daily         REVIEW OF SYSTEMS:  Unobtainable secondary to cognitive impairment.      Physical Exam:  /79  Pulse 79  Temp 97.9  F (36.6  C)  Resp 16  Wt 104 lb 8 oz (47.4 kg)  SpO2 99%  BMI 19.11 kg/m2  GENERAL APPEARANCE:  Alert, in no distress, resting in bed  ENT:  Mouth and posterior oropharynx normal, dry mucous membranes, [place]  EYES:  EOM, conjunctivae slight red of left eye and lid, pupils and irises normal  NECK:  No adenopathy,masses or thyromegaly  RESP:  respiratory effort and palpation of chest normal, lungs grossly clear but diminished   CV:  Palpation and auscultation of heart done , trace edema, rate-elevated, grade 2/6 murmur, irregular rhythm, regular rate  ABDOMEN: soft, nontender, hypoactive bowel sounds  M/S: transfers with boby  SKIN: Overall intact few areas with scabs, scarring marks from previous scratching  NEURO:   Examination of sensation by touch normal  PSYCH:  insight and judgement impaired, memory impaired      Recent Labs:     CBC RESULTS:   Recent Labs   Lab Test  09/10/18   1425  18   0724  18   0602   WBC  6.6   --   9.2   RBC  3.94   --   3.06*   HGB  12.1   --   9.8*   HCT  38.7   --   31.2*   MCV  98   --   102*   MCH  30.7   --   32.0   MCHC  31.3*   --   31.4*   RDW  13.9   --   13.1   PLT  359  191  179       Last Basic Metabolic Panel:  Recent Labs   Lab Test  09/10/18   1425  18   0602   NA  139  141   POTASSIUM  4.3  4.1   CHLORIDE  106  112*   BOLA  9.0  7.9*   CO2  26  22   BUN  21  25   CR  0.66  0.75   GLC  94  85       TSH   Date Value Ref Range Status   2018 1.48 0.40 - 4.00 mU/L Final   2017 1.89 0.40 - 4.00 mU/L Final         Assessment/Plan:  (H57.89) Eye irritation  (primary encounter diagnosis)  Comment: treating for conjunctivitis   Plan:    -Erythromycin 5mg/gm oint. Wash around the left eye with warm cloth and apply 1 cm ribbon into upper and lower eyelid BID x 10 days       Electronically signed by  HIGINIO Pena CNP

## 2018-10-25 NOTE — LETTER
10/25/2018        RE: April Donnelly  [facility]  [address] [facility] [address]        [place] [facility]    Chief Complaint   Patient presents with     RICHARDECK       [place] Medical Record Number:  [id number]  Place of Service where encounter took place:  [facility] [[id number]]    HPI:    April Donnelly is a 88 year old  (1/30/1930), who is being seen today for an episodic care visit.  HPI information obtained from: facility chart records.Today's concern is:    Eye irritation  First noticed by staff last week, reddened lids of left eye no redness of sclera but does have some yellowish drainage. This continues despite warm compress alternated with cold packs. Today dried yellow drainage in left lacrimal, no non-verbal indications of itch or pain but family believes she may be trying to scratch at it. Memory care resident and unable to provide any history.    ALLERGIES: Hydrocodone; Lisinopril; and Terazosin  Past Medical, Surgical, Family and Social History reviewed and updated in [facility].    Current Outpatient Prescriptions   Medication Sig Dispense Refill     acetaminophen (TYLENOL) 32 mg/mL solution 20.3 mL twice daily at 9am and 6pm AND 20.3 mL daily as needed       camphor-menthol (DERMASARRA) 0.5-0.5 % LOTN Apply lotion to body daily 1 Bottle 11     docusate (COLACE) 50 MG/5ML liquid 5 mL by mouth twice daily at 9am and 6pm       levothyroxine (SYNTHROID/LEVOTHROID) 50 MCG tablet TAKE 1 TABLET BY MOUTH ONCE DAILY 31 tablet 97     lidocaine (XYLOCAINE) 5 % ointment Apply topically daily       Menthol-Zinc Oxide (CALMOSEPTINE) 0.44-20.6 % OINT Externally apply 71 g topically 2 times daily APPLY TOPICALLY TO COCCYX TWICE DAILY AND WITH ALL INCONTINENT CHANGES FOR SKIN ITCH AND BREAKDOWN 113 g 11     metoprolol (LOPRESSOR) 10 mg/mL SUSP 1.25 mL twice daily at 9am and 6pm       sennosides (SENOKOT) 8.8 MG/5ML syrup Take 5 mLs by mouth 2 times daily        Skin Protectants, Misc. (EUCERIN) cream Apply topically 3 times daily as needed for dry skin       [DISCONTINUED] metoprolol tartrate (LOPRESSOR) 25 MG tablet Take 0.5 tablets (12.5 mg) by mouth 2 times daily (Patient not taking: Reported on 10/25/2018) 60 tablet 0     Patient Active Problem List   Diagnosis     Cerebrovascular accident (H)     Hemorrhagic stroke (H)     Syncope     Adjustment disorder with mixed anxiety and depressed mood     Hypertensive heart disease without heart failure     Advance care planning     Closed nondisplaced fracture of pelvis with routine healing, unspecified part of pelvis, subsequent encounter     Vascular dementia without behavioral disturbance     Acquired hypothyroidism     Rash and nonspecific skin eruption     Hemiparesis following cerebrovascular accident (CVA) (H)     Hip fracture (H)     S/P total hip arthroplasty       Medications reviewed:  Medications reconciled to facility chart and changes were made to reflect current medications as identified as above med list. Below are the changes that were made:   Medications stopped since last EPIC medication reconciliation:   Medications Discontinued During This Encounter   Medication Reason     acetaminophen (TYLENOL) 325 MG tablet Discontinued by another Health Care Provider     aspirin 325 MG EC tablet Discontinued by another Health Care Provider     docusate (COLACE) 50 MG/5ML liquid Discontinued by another Health Care Provider     metoprolol tartrate (LOPRESSOR) 25 MG tablet Discontinued by another Health Care Provider     senna-docusate (SENOKOT-S;PERICOLACE) 8.6-50 MG per tablet Discontinued by another Health Care Provider       Medications started since last [facility] medication reconciliation:  Orders Placed This Encounter   Medications     acetaminophen (TYLENOL) 32 mg/mL solution     Si.3 mL twice daily at 9am and 6pm AND 20.3 mL daily as needed     docusate (COLACE) 50 MG/5ML liquid     Si mL by mouth twice  daily at 9am and 6pm     Skin Protectants, Misc. (EUCERIN) cream     Sig: Apply topically 3 times daily as needed for dry skin     metoprolol (LOPRESSOR) 10 mg/mL SUSP     Si.25 mL twice daily at 9am and 6pm     sennosides (SENOKOT) 8.8 MG/5ML syrup     Sig: Take 5 mLs by mouth 2 times daily         REVIEW OF SYSTEMS:  Unobtainable secondary to cognitive impairment.      Physical Exam:  /79  Pulse 79  Temp 97.9  F (36.6  C)  Resp 16  Wt 104 lb 8 oz (47.4 kg)  SpO2 99%  BMI 19.11 kg/m2  GENERAL APPEARANCE:  Alert, in no distress, resting in bed  ENT:  Mouth and posterior oropharynx normal, dry mucous membranes, [place]  EYES:  EOM, conjunctivae slight red of left eye and lid, pupils and irises normal  NECK:  No adenopathy,masses or thyromegaly  RESP:  respiratory effort and palpation of chest normal, lungs grossly clear but diminished   CV:  Palpation and auscultation of heart done , trace edema, rate-elevated, grade 2/6 murmur, irregular rhythm, regular rate  ABDOMEN: soft, nontender, hypoactive bowel sounds  M/S: transfers with boby  SKIN: Overall intact few areas with scabs, scarring marks from previous scratching  NEURO:   Examination of sensation by touch normal  PSYCH:  insight and judgement impaired, memory impaired      Recent Labs:     CBC RESULTS:   Recent Labs   Lab Test  09/10/18   1425  18   0724  18   0602   WBC  6.6   --   9.2   RBC  3.94   --   3.06*   HGB  12.1   --   9.8*   HCT  38.7   --   31.2*   MCV  98   --   102*   MCH  30.7   --   32.0   MCHC  31.3*   --   31.4*   RDW  13.9   --   13.1   PLT  359  191  179       Last Basic Metabolic Panel:  Recent Labs   Lab Test  09/10/18   1425  18   0602   NA  139  141   POTASSIUM  4.3  4.1   CHLORIDE  106  112*   BOLA  9.0  7.9*   CO2  26  22   BUN  21  25   CR  0.66  0.75   GLC  94  85       TSH   Date Value Ref Range Status   2018 1.48 0.40 - 4.00 mU/L Final   2017 1.89 0.40 - 4.00 mU/L Final          Assessment/Plan:  (H57.89) Eye irritation  (primary encounter diagnosis)  Comment: treating for conjunctivitis   Plan:   -Erythromycin 5mg/gm oint. Wash around the left eye with warm cloth and apply 1 cm ribbon into upper and lower eyelid BID x 10 days       Electronically signed by  HIGINIO Pena CNP                      Sincerely,        HIGINIO Pena CNP

## 2018-11-27 ENCOUNTER — ASSISTED LIVING VISIT (OUTPATIENT)
Dept: GERIATRICS | Facility: CLINIC | Age: 83
End: 2018-11-27
Payer: MEDICARE

## 2018-11-27 VITALS
DIASTOLIC BLOOD PRESSURE: 84 MMHG | SYSTOLIC BLOOD PRESSURE: 145 MMHG | OXYGEN SATURATION: 99 % | WEIGHT: 104.5 LBS | TEMPERATURE: 98.2 F | HEART RATE: 78 BPM | BODY MASS INDEX: 19.11 KG/M2 | RESPIRATION RATE: 16 BRPM

## 2018-11-27 DIAGNOSIS — M54.5 CHRONIC BILATERAL LOW BACK PAIN, WITH SCIATICA PRESENCE UNSPECIFIED: ICD-10-CM

## 2018-11-27 DIAGNOSIS — Z53.20 PATIENT REFUSES TO TAKE MEDICATION: ICD-10-CM

## 2018-11-27 DIAGNOSIS — E03.9 ACQUIRED HYPOTHYROIDISM: ICD-10-CM

## 2018-11-27 DIAGNOSIS — Z86.73 HISTORY OF CVA (CEREBROVASCULAR ACCIDENT): ICD-10-CM

## 2018-11-27 DIAGNOSIS — G89.29 CHRONIC BILATERAL LOW BACK PAIN, WITH SCIATICA PRESENCE UNSPECIFIED: ICD-10-CM

## 2018-11-27 DIAGNOSIS — F01.50 VASCULAR DEMENTIA WITHOUT BEHAVIORAL DISTURBANCE (H): ICD-10-CM

## 2018-11-27 DIAGNOSIS — K59.01 SLOW TRANSIT CONSTIPATION: ICD-10-CM

## 2018-11-27 DIAGNOSIS — I48.0 PAROXYSMAL ATRIAL FIBRILLATION (H): Primary | ICD-10-CM

## 2018-11-27 DIAGNOSIS — Z98.890 HISTORY OF HIP SURGERY: ICD-10-CM

## 2018-11-27 DIAGNOSIS — R21 RASH AND NONSPECIFIC SKIN ERUPTION: ICD-10-CM

## 2018-11-27 DIAGNOSIS — I10 BENIGN ESSENTIAL HYPERTENSION: ICD-10-CM

## 2018-11-27 NOTE — LETTER
11/27/2018        RE: April Donnelly  [facility]  [address] [facility] [address]        [place] [facility]  Chief Complaint   Patient presents with     Annual Comprehensive Exam Assisted Living       [place] Medical Record Number:  [id number]  Place of Service where encounter took place:  [facility] [[id number]]      HPI:    April Donnelly is a 88 year old  (1/30/1930), who is being seen today for an annual comprehensive visit.  HPI information obtained from: facility chart records.  Today's concerns are:    Paroxysmal atrial fibrillation (H)  History of CVA (cerebrovascular accident)  Benign essential hypertension  Irregular rhythm with regular rate today. Paced. Not on anticoagulation related to fall risk and history of hemorrhagic stroke. Last ECHO had normal LV with preserved ejection fraction. BP average is 134/73 HR 76.    Patient refuses to take medication  Moved to liquid form and no concerns from staff.     Acquired hypothyroidism  Continues on replacement    History of hip surgery  Use of broda chair during the days and boby lift transfer.     Rash and nonspecific skin eruption  History of picking at skin and use of cotton gloves overnight. No open lesions to visualized areas today. Derma-Jeaneth daily.     Chronic bilateral low back pain, with sciatica presence unspecified  Lidocaine topical daily to affected area and scheduled Tylenol. Use of broda chair during the days and boby lift transfer.     Vascular dementia without behavioral disturbance  Relies on staff for all ADLs.    Constipation   History of condition with recent increase of loose stools per staff.    Based on JNC-8 goals,  patients age of 88 year old, no presence of diabetes or CKD, and goals of care goal BP is  <140/90 mm Hg. Patient is stable with current plan of care and routine assessment..      ALLERGIES: Hydrocodone; Lisinopril; and Terazosin  PROBLEM LIST:  Patient  Active Problem List   Diagnosis     Cerebrovascular accident (H)     Hemorrhagic stroke (H)     Syncope     Adjustment disorder with mixed anxiety and depressed mood     Hypertensive heart disease without heart failure     Advance care planning     Closed nondisplaced fracture of pelvis with routine healing, unspecified part of pelvis, subsequent encounter     Vascular dementia without behavioral disturbance     Acquired hypothyroidism     Rash and nonspecific skin eruption     Hemiparesis following cerebrovascular accident (CVA) (H)     Hip fracture (H)     S/P total hip arthroplasty     PAST MEDICAL HISTORY:  has a past medical history of Adjustment disorder with mixed anxiety and depressed mood (12/2/2016); Adjustment disorder with mixed anxiety and depressed mood (12/2/2016); Cerebrovascular accident (H) (12/2/2016); CVA (cerebral vascular accident) (H); Hemorrhagic stroke (H) (12/2/2016); Hypertensive heart disease without heart failure (12/2/2016); Severe dementia (12/2/2016); Syncope (12/2/2016); and Thyroid disease.  PAST SURGICAL HISTORY:  has a past surgical history that includes Thyroidectomy and Open reduction internal fixation hip bipolar (Right, 8/24/2018).  FAMILY HISTORY: Family history is unknown by patient.  SOCIAL HISTORY:  reports that she has never smoked. She has never used smokeless tobacco. She reports that she does not drink alcohol or use illicit drugs.  IMMUNIZATIONS:  Most Recent Immunizations   Administered Date(s) Administered     Influenza (High Dose) 3 valent vaccine 10/03/2018     Influenza (IIV3) PF 10/12/2016     Pneumo Conj 13-V (2010&after) 12/06/2016     Pneumococcal 23 valent 11/06/2012     TDAP Vaccine (Boostrix) 10/27/2016     Above immunizations pulled from [facility]. [facility] and facility records also reconciled. Outstanding information sent to  to update [facility].  Future immunizations are not needed at this point as all recommended immunizations are  up to date.   MEDICATIONS:  Current Outpatient Prescriptions   Medication Sig Dispense Refill     acetaminophen (TYLENOL) 32 mg/mL solution 20.3 mL twice daily at 9am and 6pm AND 20.3 mL daily as needed       camphor-menthol (DERMASARRA) 0.5-0.5 % LOTN Apply lotion to body daily 1 Bottle 11     docusate (COLACE) 50 MG/5ML liquid 5 mL by mouth twice daily at 9am and 6pm       levothyroxine (SYNTHROID/LEVOTHROID) 50 MCG tablet TAKE 1 TABLET BY MOUTH ONCE DAILY 31 tablet 97     lidocaine (XYLOCAINE) 5 % ointment Apply topically daily       Menthol-Zinc Oxide (CALMOSEPTINE) 0.44-20.6 % OINT Externally apply 71 g topically 2 times daily APPLY TOPICALLY TO COCCYX TWICE DAILY AND WITH ALL INCONTINENT CHANGES FOR SKIN ITCH AND BREAKDOWN 113 g 11     metoprolol (LOPRESSOR) 10 mg/mL SUSP 1.25 mL twice daily at 9am and 6pm       sennosides (SENOKOT) 8.8 MG/5ML syrup Take 5 mLs by mouth 2 times daily       Skin Protectants, Misc. (EUCERIN) cream Apply topically 3 times daily as needed for dry skin       Medications reviewed:  Medications reconciled to facility chart and changes were made to reflect current medications as identified as above med list. Below are the changes that were made:   Medications stopped since last EPIC medication reconciliation:   There are no discontinued medications.    Medications started since last [facility] medication reconciliation:  No orders of the defined types were placed in this encounter.    Case Management:  I have reviewed the Assisted Living care plan, current immunizations and preventive care/cancer screening..Future cancer screening is not clinically indicated secondary to age/goals of care Patient's desire to return to the community is not assessible due to cognitive impairment. Current Level of Care is appropriate.    Advance Directive Discussion:    I reviewed the current advanced directives as reflected in EPIC, the POLST and the facility chart, and verified the congruency of orders  11/27/18. I contacted the first party Handt and left a message regarding the plan of Care.  I did not due to cognitive impairment review the advance directives with the resident.     Team Discussion:  I communicated with the appropriate disciplines involved with the Plan of Care:   Nursing      Patient Goal:  Patient's goal is pain control and comfort.    Information reviewed:  Medications, vital signs, orders, and nursing notes.    REVIEW OF SYSTEMS:  Unobtainable secondary to cognitive impairment.     Exam:  /84  Pulse 78  Temp 98.2  F (36.8  C)  Resp 16  Wt 104 lb 8 oz (47.4 kg)  SpO2 99%  BMI 19.11 kg/m2  GENERAL APPEARANCE:  Alert, in no distress, resting in bed  ENT:  Mouth and posterior oropharynx normal, dry mucous membranes, [place]  EYES:  EOM, conjunctivae slight red of left eye and lid, pupils and irises normal  NECK:  No adenopathy,masses or thyromegaly  RESP:  respiratory effort and palpation of chest normal, lungs grossly clear but diminished   CV:  Palpation and auscultation of heart done , trace edema, grade 2/6 murmur, irregular rhythm, regular rate  ABDOMEN: soft, nontender, hypoactive bowel sounds  M/S: transfers with boby  SKIN: Overall intact few areas with scabs, scarring marks from previous scratching  NEURO:   Examination of sensation by touch normal  PSYCH:  insight and judgement impaired, memory impaired     Lab/Diagnostic data:     CBC RESULTS:   Recent Labs   Lab Test  09/10/18   1425  08/27/18   0724  08/26/18   0602   WBC  6.6   --   9.2   RBC  3.94   --   3.06*   HGB  12.1   --   9.8*   HCT  38.7   --   31.2*   MCV  98   --   102*   MCH  30.7   --   32.0   MCHC  31.3*   --   31.4*   RDW  13.9   --   13.1   PLT  359  191  179       Last Basic Metabolic Panel:  Recent Labs   Lab Test  09/10/18   1425  08/26/18   0602   NA  139  141   POTASSIUM  4.3  4.1   CHLORIDE  106  112*   BOLA  9.0  7.9*   CO2  26  22   BUN  21  25   CR  0.66  0.75   GLC  94  85     TSH   Date Value Ref  Range Status   08/23/2018 1.48 0.40 - 4.00 mU/L Final   04/26/2017 1.89 0.40 - 4.00 mU/L Final       ASSESSMENT/PLAN  (I48.0) Paroxysmal atrial fibrillation (H)  (primary encounter diagnosis)  (Z86.73) History of CVA (cerebrovascular accident)  (I10) Benign essential hypertension  Comment: Chronic  Plan:   -VS BID with medication pass  -Weights monthly  -Metoprolol 12.5 mg po BID    (Z53.20) Patient refuses to take medication  Comment: Moved to liquid form  Plan:   -Appears more compliant with liquid. Crush others as tolerated    (E03.9) Acquired hypothyroidism  Comment: Chronic  Plan:   -Annual TSH  -Levothyroxine 50 mcg po daily    (Z98.890) History of hip surgery  Comment: No longer ambulating   Plan:   -Scheduled Tylenol for pain and comfort  -Staff assist for boby transfers    (R21) Rash and nonspecific skin eruption  Comment: History of flares at times  Plan:   -Derma Jeaneth daily to body  -Calmoseptine to coccyx BID and prn   -Will discontinue Eucerin cream    (M54.5,  G89.29) Chronic bilateral low back pain, with sciatica presence unspecified  Comment: Chronic  Plan:   -Repositioned often in broda  -Offloading in afternoon after lunch into bed for rest    (F01.50) Vascular dementia without behavioral disturbance  Comment: Advanced  Plan:   -Staff assist for all cares. Goal of care is comfort focus    (K59.01) Slow transit constipation  Comment: Now with loose stools  Plan:   -Reducing Senna/colace  to once daily scheduled and once daily prn  -Hold for loose stools          Electronically signed by:  HIGINIO Pena CNP          Sincerely,        HIGINIO Pena CNP

## 2018-11-27 NOTE — PROGRESS NOTES
[place] [facility]  Chief Complaint   Patient presents with     Annual Comprehensive Exam Assisted Living       [place] Medical Record Number:  [id number]  Place of Service where encounter took place:  [facility] [[id number]]      HPI:    April Donnelly is a 88 year old  (1/30/1930), who is being seen today for an annual comprehensive visit.  HPI information obtained from: facility chart records.  Today's concerns are:    Paroxysmal atrial fibrillation (H)  History of CVA (cerebrovascular accident)  Benign essential hypertension  Irregular rhythm with regular rate today. Paced. Not on anticoagulation related to fall risk and history of hemorrhagic stroke. Last ECHO had normal LV with preserved ejection fraction. BP average is 134/73 HR 76.    Patient refuses to take medication  Moved to liquid form and no concerns from staff.     Acquired hypothyroidism  Continues on replacement    History of hip surgery  Use of broda chair during the days and boby lift transfer.     Rash and nonspecific skin eruption  History of picking at skin and use of cotton gloves overnight. No open lesions to visualized areas today. Derma-Jeaneth daily.     Chronic bilateral low back pain, with sciatica presence unspecified  Lidocaine topical daily to affected area and scheduled Tylenol. Use of broda chair during the days and boby lift transfer.     Vascular dementia without behavioral disturbance  Relies on staff for all ADLs.    Constipation   History of condition with recent increase of loose stools per staff.    Based on JNC-8 goals,  patients age of 88 year old, no presence of diabetes or CKD, and goals of care goal BP is  <140/90 mm Hg. Patient is stable with current plan of care and routine assessment..      ALLERGIES: Hydrocodone; Lisinopril; and Terazosin  PROBLEM LIST:  Patient Active Problem List   Diagnosis     Cerebrovascular accident (H)     Hemorrhagic stroke (H)     Syncope     Adjustment  disorder with mixed anxiety and depressed mood     Hypertensive heart disease without heart failure     Advance care planning     Closed nondisplaced fracture of pelvis with routine healing, unspecified part of pelvis, subsequent encounter     Vascular dementia without behavioral disturbance     Acquired hypothyroidism     Rash and nonspecific skin eruption     Hemiparesis following cerebrovascular accident (CVA) (H)     Hip fracture (H)     S/P total hip arthroplasty     PAST MEDICAL HISTORY:  has a past medical history of Adjustment disorder with mixed anxiety and depressed mood (12/2/2016); Adjustment disorder with mixed anxiety and depressed mood (12/2/2016); Cerebrovascular accident (H) (12/2/2016); CVA (cerebral vascular accident) (H); Hemorrhagic stroke (H) (12/2/2016); Hypertensive heart disease without heart failure (12/2/2016); Severe dementia (12/2/2016); Syncope (12/2/2016); and Thyroid disease.  PAST SURGICAL HISTORY:  has a past surgical history that includes Thyroidectomy and Open reduction internal fixation hip bipolar (Right, 8/24/2018).  FAMILY HISTORY: Family history is unknown by patient.  SOCIAL HISTORY:  reports that she has never smoked. She has never used smokeless tobacco. She reports that she does not drink alcohol or use illicit drugs.  IMMUNIZATIONS:  Most Recent Immunizations   Administered Date(s) Administered     Influenza (High Dose) 3 valent vaccine 10/03/2018     Influenza (IIV3) PF 10/12/2016     Pneumo Conj 13-V (2010&after) 12/06/2016     Pneumococcal 23 valent 11/06/2012     TDAP Vaccine (Boostrix) 10/27/2016     Above immunizations pulled from [facility]. [facility]IC and facility records also reconciled. Outstanding information sent to  to update [facility].  Future immunizations are not needed at this point as all recommended immunizations are up to date.   MEDICATIONS:  Current Outpatient Prescriptions   Medication Sig Dispense Refill     acetaminophen  (TYLENOL) 32 mg/mL solution 20.3 mL twice daily at 9am and 6pm AND 20.3 mL daily as needed       camphor-menthol (DERMASARRA) 0.5-0.5 % LOTN Apply lotion to body daily 1 Bottle 11     docusate (COLACE) 50 MG/5ML liquid 5 mL by mouth twice daily at 9am and 6pm       levothyroxine (SYNTHROID/LEVOTHROID) 50 MCG tablet TAKE 1 TABLET BY MOUTH ONCE DAILY 31 tablet 97     lidocaine (XYLOCAINE) 5 % ointment Apply topically daily       Menthol-Zinc Oxide (CALMOSEPTINE) 0.44-20.6 % OINT Externally apply 71 g topically 2 times daily APPLY TOPICALLY TO COCCYX TWICE DAILY AND WITH ALL INCONTINENT CHANGES FOR SKIN ITCH AND BREAKDOWN 113 g 11     metoprolol (LOPRESSOR) 10 mg/mL SUSP 1.25 mL twice daily at 9am and 6pm       sennosides (SENOKOT) 8.8 MG/5ML syrup Take 5 mLs by mouth 2 times daily       Skin Protectants, Misc. (EUCERIN) cream Apply topically 3 times daily as needed for dry skin       Medications reviewed:  Medications reconciled to facility chart and changes were made to reflect current medications as identified as above med list. Below are the changes that were made:   Medications stopped since last EPIC medication reconciliation:   There are no discontinued medications.    Medications started since last [facility] medication reconciliation:  No orders of the defined types were placed in this encounter.    Case Management:  I have reviewed the Assisted Living care plan, current immunizations and preventive care/cancer screening..Future cancer screening is not clinically indicated secondary to age/goals of care Patient's desire to return to the community is not assessible due to cognitive impairment. Current Level of Care is appropriate.    Advance Directive Discussion:    I reviewed the current advanced directives as reflected in EPIC, the POLST and the facility chart, and verified the congruency of orders 11/27/18. I contacted the first party Handt and left a message regarding the plan of Care.  I did not due to cognitive  impairment review the advance directives with the resident.     Team Discussion:  I communicated with the appropriate disciplines involved with the Plan of Care:   Nursing      Patient Goal:  Patient's goal is pain control and comfort.    Information reviewed:  Medications, vital signs, orders, and nursing notes.    REVIEW OF SYSTEMS:  Unobtainable secondary to cognitive impairment.     Exam:  /84  Pulse 78  Temp 98.2  F (36.8  C)  Resp 16  Wt 104 lb 8 oz (47.4 kg)  SpO2 99%  BMI 19.11 kg/m2  GENERAL APPEARANCE:  Alert, in no distress, resting in bed  ENT:  Mouth and posterior oropharynx normal, dry mucous membranes, [place]  EYES:  EOM, conjunctivae slight red of left eye and lid, pupils and irises normal  NECK:  No adenopathy,masses or thyromegaly  RESP:  respiratory effort and palpation of chest normal, lungs grossly clear but diminished   CV:  Palpation and auscultation of heart done , trace edema, grade 2/6 murmur, irregular rhythm, regular rate  ABDOMEN: soft, nontender, hypoactive bowel sounds  M/S: transfers with boby  SKIN: Overall intact few areas with scabs, scarring marks from previous scratching  NEURO:   Examination of sensation by touch normal  PSYCH:  insight and judgement impaired, memory impaired     Lab/Diagnostic data:     CBC RESULTS:   Recent Labs   Lab Test  09/10/18   1425  08/27/18   0724  08/26/18   0602   WBC  6.6   --   9.2   RBC  3.94   --   3.06*   HGB  12.1   --   9.8*   HCT  38.7   --   31.2*   MCV  98   --   102*   MCH  30.7   --   32.0   MCHC  31.3*   --   31.4*   RDW  13.9   --   13.1   PLT  359  191  179       Last Basic Metabolic Panel:  Recent Labs   Lab Test  09/10/18   1425  08/26/18   0602   NA  139  141   POTASSIUM  4.3  4.1   CHLORIDE  106  112*   BOLA  9.0  7.9*   CO2  26  22   BUN  21  25   CR  0.66  0.75   GLC  94  85     TSH   Date Value Ref Range Status   08/23/2018 1.48 0.40 - 4.00 mU/L Final   04/26/2017 1.89 0.40 - 4.00 mU/L Final        ASSESSMENT/PLAN  (I48.0) Paroxysmal atrial fibrillation (H)  (primary encounter diagnosis)  (Z86.73) History of CVA (cerebrovascular accident)  (I10) Benign essential hypertension  Comment: Chronic  Plan:   -VS BID with medication pass  -Weights monthly  -Metoprolol 12.5 mg po BID    (Z53.20) Patient refuses to take medication  Comment: Moved to liquid form  Plan:   -Appears more compliant with liquid. Crush others as tolerated    (E03.9) Acquired hypothyroidism  Comment: Chronic  Plan:   -Annual TSH  -Levothyroxine 50 mcg po daily    (Z98.890) History of hip surgery  Comment: No longer ambulating   Plan:   -Scheduled Tylenol for pain and comfort  -Staff assist for boby transfers    (R21) Rash and nonspecific skin eruption  Comment: History of flares at times  Plan:   -Derma Jeaneth daily to body  -Calmoseptine to coccyx BID and prn   -Will discontinue Eucerin cream    (M54.5,  G89.29) Chronic bilateral low back pain, with sciatica presence unspecified  Comment: Chronic  Plan:   -Repositioned often in broda  -Offloading in afternoon after lunch into bed for rest    (F01.50) Vascular dementia without behavioral disturbance  Comment: Advanced  Plan:   -Staff assist for all cares. Goal of care is comfort focus    (K59.01) Slow transit constipation  Comment: Now with loose stools  Plan:   -Reducing Senna/colace  to once daily scheduled and once daily prn  -Hold for loose stools          Electronically signed by:  HIGINIO Pena CNP

## 2018-12-17 DIAGNOSIS — I10 BENIGN ESSENTIAL HYPERTENSION: ICD-10-CM

## 2018-12-17 RX ORDER — METOPROLOL TARTRATE 25 MG/1
TABLET, FILM COATED ORAL
Qty: 31 TABLET | Refills: 98 | Status: SHIPPED | OUTPATIENT
Start: 2018-12-17 | End: 2019-12-22

## 2019-01-08 ENCOUNTER — ASSISTED LIVING VISIT (OUTPATIENT)
Dept: GERIATRICS | Facility: CLINIC | Age: 84
End: 2019-01-08
Payer: MEDICARE

## 2019-01-08 VITALS
WEIGHT: 97 LBS | RESPIRATION RATE: 16 BRPM | SYSTOLIC BLOOD PRESSURE: 115 MMHG | HEART RATE: 56 BPM | TEMPERATURE: 97.3 F | DIASTOLIC BLOOD PRESSURE: 65 MMHG | OXYGEN SATURATION: 98 % | BODY MASS INDEX: 17.74 KG/M2

## 2019-01-08 DIAGNOSIS — I48.0 PAROXYSMAL ATRIAL FIBRILLATION (H): ICD-10-CM

## 2019-01-08 DIAGNOSIS — F01.50 VASCULAR DEMENTIA WITHOUT BEHAVIORAL DISTURBANCE (H): ICD-10-CM

## 2019-01-08 DIAGNOSIS — I10 BENIGN ESSENTIAL HYPERTENSION: Primary | ICD-10-CM

## 2019-01-08 DIAGNOSIS — Z86.73 H/O: CVA (CEREBROVASCULAR ACCIDENT): ICD-10-CM

## 2019-01-08 DIAGNOSIS — R21 RASH AND NONSPECIFIC SKIN ERUPTION: ICD-10-CM

## 2019-01-08 DIAGNOSIS — Z98.890 HISTORY OF HIP SURGERY: ICD-10-CM

## 2019-01-08 DIAGNOSIS — R19.5 LOOSE STOOLS: ICD-10-CM

## 2019-01-08 DIAGNOSIS — E03.9 ACQUIRED HYPOTHYROIDISM: ICD-10-CM

## 2019-01-08 RX ORDER — ACETAMINOPHEN 325 MG/1
650 TABLET ORAL 2 TIMES DAILY
COMMUNITY
End: 2019-02-10

## 2019-01-08 RX ORDER — SENNOSIDES A AND B 8.6 MG/1
1 TABLET, FILM COATED ORAL DAILY PRN
COMMUNITY
End: 2020-03-23

## 2019-01-08 NOTE — LETTER
1/8/2019        RE: April Donnelly  [facility]  [address] [facility] [id number]          [place] [facility]    Chief Complaint   Patient presents with     RECHECK       [place] Medical Record Number:  [id number]  Place of Service where encounter took place:  [facility]  (S) [[id number]]    HPI:    April Donnelly is a 88 year old  (1/30/1930), who is being seen today for a federally mandated E/M visit.  HPI information obtained from: facility chart records, facility staff and [facility] chart review. Today's concerns are:    Benign essential hypertension  Paroxysmal atrial fibrillation (H)  H/O: CVA (cerebrovascular accident)  BPs bid with medication pass. Average is 137/79 HR 86. Seen today and HR is slower, irregular. BP on lower side but was taken early morning while in bed. Not on anticoagulation related to fall risk and history of hemorrhagic stroke. Last ECHO had normal LV with preserved ejection fraction.     Acquired hypothyroidism  Stable on replacement.    History of hip surgery  Continues to use broda chair during the days. Was recently moved from boby transfer to EZ Stand. Does need assist of 2 as unable to follow verbal que for hand placement.    Rash and nonspecific skin eruption  Longstanding history of skin picking which has improved with use of cotton gloves overnight. No open areas noted with visit.    Loose stools  Per staff. With a history of constipation.     Vascular dementia without behavioral disturbance  Dependent on staff for most ADLs. Calm and pleasant at today's visit.      ALLERGIES: Hydrocodone; Lisinopril; and Terazosin  PAST MEDICAL HISTORY:  has a past medical history of Adjustment disorder with mixed anxiety and depressed mood (12/2/2016), Adjustment disorder with mixed anxiety and depressed mood (12/2/2016), Cerebrovascular accident (H) (12/2/2016), CVA (cerebral vascular accident) (H), Hemorrhagic stroke (H) (12/2/2016),  Hypertensive heart disease without heart failure (12/2/2016), Severe dementia (12/2/2016), Syncope (12/2/2016), and Thyroid disease.  PAST SURGICAL HISTORY:  has a past surgical history that includes Thyroidectomy and Open reduction internal fixation hip bipolar (Right, 8/24/2018).  FAMILY HISTORY: Family history is unknown by patient.  SOCIAL HISTORY:  reports that  has never smoked. she has never used smokeless tobacco. She reports that she does not drink alcohol or use drugs.    MEDICATIONS:  Current Outpatient Medications   Medication Sig Dispense Refill     Menthol-Zinc Oxide (CALMOSEPTINE) 0.44-20.6 % OINT Externally apply 71 g topically 2 times daily APPLY TOPICALLY TO COCCYX TWICE DAILY AND WITH ALL INCONTINENT CHANGES FOR SKIN ITCH AND BREAKDOWN 113 g 11     acetaminophen (TYLENOL) 32 mg/mL solution 20.3 mL twice daily at 9am and 6pm AND 20.3 mL daily as needed       camphor-menthol (DERMASARRA) 0.5-0.5 % LOTN Apply lotion to body daily 1 Bottle 11     docusate (COLACE) 50 MG/5ML liquid Take 50 mg by mouth daily 5 mL by mouth daily and daily prn. Hold for loose stools       levothyroxine (SYNTHROID/LEVOTHROID) 50 MCG tablet TAKE 1 TABLET BY MOUTH ONCE DAILY 31 tablet 97     lidocaine (XYLOCAINE) 5 % ointment Apply topically daily       metoprolol (LOPRESSOR) 10 mg/mL SUSP 1.25 mL twice daily at 9am and 6pm       metoprolol tartrate (LOPRESSOR) 25 MG tablet TAKE ONE-HALF TABLET (12.5MG) BY MOUTH TWICE DAILY * OK TO CRUSH * 31 tablet 98     sennosides (SENOKOT) 8.8 MG/5ML syrup Take 5 mLs by mouth daily AND daily prn. Hold for loose stools       Patient Active Problem List   Diagnosis     Cerebrovascular accident (H)     Hemorrhagic stroke (H)     Syncope     Adjustment disorder with mixed anxiety and depressed mood     Hypertensive heart disease without heart failure     Advance care planning     Closed nondisplaced fracture of pelvis with routine healing, unspecified part of pelvis, subsequent encounter      Vascular dementia without behavioral disturbance     Acquired hypothyroidism     Rash and nonspecific skin eruption     Hemiparesis following cerebrovascular accident (CVA) (H)     Hip fracture (H)     S/P total hip arthroplasty       Medications reviewed:  Medications reconciled to facility chart and changes were made to reflect current medications as identified as above med list. Below are the changes that were made:   Medications stopped since last EPIC medication reconciliation:   There are no discontinued medications.    Medications started since last [facility] medication reconciliation:  No orders of the defined types were placed in this encounter.    Case Management:  I have reviewed the care plan and MDS and do agree with the plan. Patient's desire to return to the community is not assessible due to cognitive impairment.  Information reviewed:  Medications, vital signs, orders, and nursing notes.    ROS:  Unobtainable secondary to cognitive impairment.     Exam:  Vitals: /65   Pulse 56   Temp 97.3  F (36.3  C)   Resp 16   Wt 44 kg (97 lb)   SpO2 98%   BMI 17.74 kg/m    BMI= Body mass index is 17.74 kg/m .  GENERAL APPEARANCE:  Alert, in no distress, resting in bed  ENT:  Mouth and posterior oropharynx normal, dry mucous membranes, [place]  EYES:  EOM, conjunctivae slight red of left eye and lid, pupils and irises normal  RESP:  respiratory effort and palpation of chest normal, lungs grossly clear but diminished   CV:  Palpation and auscultation of heart done , trace edema, grade 2/6 murmur, irregular rhythm, regular rate  ABDOMEN: soft, nontender, hypoactive bowel sounds  M/S: transfers with boby, now with EZ Stand  SKIN: Overall intact few areas with scabs, scarring marks from previous scratching  NEURO:   Examination of sensation by touch normal  PSYCH:  insight and judgement impaired, memory impaired     Lab/Diagnostic data:     CBC RESULTS:   Recent Labs   Lab Test 09/10/18  1425 08/27/18  0781  08/26/18  0602   WBC 6.6  --  9.2   RBC 3.94  --  3.06*   HGB 12.1  --  9.8*   HCT 38.7  --  31.2*   MCV 98  --  102*   MCH 30.7  --  32.0   MCHC 31.3*  --  31.4*   RDW 13.9  --  13.1    191 179       Last Basic Metabolic Panel:  Recent Labs   Lab Test 09/10/18  1425 08/26/18  0602    141   POTASSIUM 4.3 4.1   CHLORIDE 106 112*   BOLA 9.0 7.9*   CO2 26 22   BUN 21 25   CR 0.66 0.75   GLC 94 85       Liver Function Studies -   Recent Labs   Lab Test 04/26/17 12/05/16   PROTTOTAL 6.2* 6.5   ALBUMIN 3.1* 3.2   BILITOTAL 0.4 0.6   ALKPHOS 123 143   AST 22 22   ALT 18 15       TSH   Date Value Ref Range Status   08/23/2018 1.48 0.40 - 4.00 mU/L Final   04/26/2017 1.89 0.40 - 4.00 mU/L Final       No results found for: A1C    ASSESSMENT/PLAN  (I10) Benign essential hypertension  (primary encounter diagnosis)  (I48.0) Paroxysmal atrial fibrillation (H)  (Z86.73) H/O: CVA (cerebrovascular accident)  Comment: Chronic and Stable  Plan:   -VS BID with medication pass  -Weights monthly  -Metoprolol 12.5 mg po BID  -BMP periodically     (E03.9) Acquired hypothyroidism  Comment:   Plan:   -Annual TSH  -Levothyroxine 50 mcg po daily    (Z98.890) History of hip surgery  Comment: No longer ambulating but is able is WBAT  Plan:   -Scheduled Tylenol for pain and comfort    (R21) Rash and nonspecific skin eruption  Comment: Chronic but stable  Plan:   -Calmoseptine to coccyx  BID and prn  -Cotton gloves overnights  -Sarna lotion to body is discontinued. Monitor for need to restart    (R19.5) Loose stools  Comment: History of constipation  Plan:   -Bowel medications to prn- previously reduced to once daily    (F01.50) Vascular dementia without behavioral disturbance  Comment: Advanced  Plan:   -Supportive cares for all ADLs          Electronically signed by:  HIGINIO Pena CNP        Sincerely,        Kvng Cowart APRN CNP

## 2019-01-08 NOTE — PROGRESS NOTES
[place] [facility]    Chief Complaint   Patient presents with     PARVIZ       [place] Medical Record Number:  [id number]  Place of Service where encounter took place:  [facility]  (S) [[id number]]    HPI:    April Donnelly is a 88 year old  (1/30/1930), who is being seen today for a federally mandated E/M visit.  HPI information obtained from: facility chart records, facility staff and [place] [facility] chart review. Today's concerns are:    Benign essential hypertension  Paroxysmal atrial fibrillation (H)  H/O: CVA (cerebrovascular accident)  BPs bid with medication pass. Average is 137/79 HR 86. Seen today and HR is slower, irregular. BP on lower side but was taken early morning while in bed. Not on anticoagulation related to fall risk and history of hemorrhagic stroke. Last ECHO had normal LV with preserved ejection fraction.     Acquired hypothyroidism  Stable on replacement.    History of hip surgery  Continues to use broda chair during the days. Was recently moved from boby transfer to EZ Stand. Does need assist of 2 as unable to follow verbal que for hand placement.    Rash and nonspecific skin eruption  Longstanding history of skin picking which has improved with use of cotton gloves overnight. No open areas noted with visit.    Loose stools  Per staff. With a history of constipation.     Vascular dementia without behavioral disturbance  Dependent on staff for most ADLs. Calm and pleasant at today's visit.      ALLERGIES: Hydrocodone; Lisinopril; and Terazosin  PAST MEDICAL HISTORY:  has a past medical history of Adjustment disorder with mixed anxiety and depressed mood (12/2/2016), Adjustment disorder with mixed anxiety and depressed mood (12/2/2016), Cerebrovascular accident (H) (12/2/2016), CVA (cerebral vascular accident) (H), Hemorrhagic stroke (H) (12/2/2016), Hypertensive heart disease without heart failure (12/2/2016), Severe dementia (12/2/2016), Syncope (12/2/2016), and  Thyroid disease.  PAST SURGICAL HISTORY:  has a past surgical history that includes Thyroidectomy and Open reduction internal fixation hip bipolar (Right, 8/24/2018).  FAMILY HISTORY: Family history is unknown by patient.  SOCIAL HISTORY:  reports that  has never smoked. she has never used smokeless tobacco. She reports that she does not drink alcohol or use drugs.    MEDICATIONS:  Current Outpatient Medications   Medication Sig Dispense Refill     Menthol-Zinc Oxide (CALMOSEPTINE) 0.44-20.6 % OINT Externally apply 71 g topically 2 times daily APPLY TOPICALLY TO COCCYX TWICE DAILY AND WITH ALL INCONTINENT CHANGES FOR SKIN ITCH AND BREAKDOWN 113 g 11     acetaminophen (TYLENOL) 32 mg/mL solution 20.3 mL twice daily at 9am and 6pm AND 20.3 mL daily as needed       camphor-menthol (DERMASARRA) 0.5-0.5 % LOTN Apply lotion to body daily 1 Bottle 11     docusate (COLACE) 50 MG/5ML liquid Take 50 mg by mouth daily 5 mL by mouth daily and daily prn. Hold for loose stools       levothyroxine (SYNTHROID/LEVOTHROID) 50 MCG tablet TAKE 1 TABLET BY MOUTH ONCE DAILY 31 tablet 97     lidocaine (XYLOCAINE) 5 % ointment Apply topically daily       metoprolol (LOPRESSOR) 10 mg/mL SUSP 1.25 mL twice daily at 9am and 6pm       metoprolol tartrate (LOPRESSOR) 25 MG tablet TAKE ONE-HALF TABLET (12.5MG) BY MOUTH TWICE DAILY * OK TO CRUSH * 31 tablet 98     sennosides (SENOKOT) 8.8 MG/5ML syrup Take 5 mLs by mouth daily AND daily prn. Hold for loose stools       Patient Active Problem List   Diagnosis     Cerebrovascular accident (H)     Hemorrhagic stroke (H)     Syncope     Adjustment disorder with mixed anxiety and depressed mood     Hypertensive heart disease without heart failure     Advance care planning     Closed nondisplaced fracture of pelvis with routine healing, unspecified part of pelvis, subsequent encounter     Vascular dementia without behavioral disturbance     Acquired hypothyroidism     Rash and nonspecific skin eruption      Hemiparesis following cerebrovascular accident (CVA) (H)     Hip fracture (H)     S/P total hip arthroplasty       Medications reviewed:  Medications reconciled to facility chart and changes were made to reflect current medications as identified as above med list. Below are the changes that were made:   Medications stopped since last EPIC medication reconciliation:   There are no discontinued medications.    Medications started since last [facility] medication reconciliation:  No orders of the defined types were placed in this encounter.    Case Management:  I have reviewed the care plan and MDS and do agree with the plan. Patient's desire to return to the community is not assessible due to cognitive impairment.  Information reviewed:  Medications, vital signs, orders, and nursing notes.    ROS:  Unobtainable secondary to cognitive impairment.     Exam:  Vitals: /65   Pulse 56   Temp 97.3  F (36.3  C)   Resp 16   Wt 44 kg (97 lb)   SpO2 98%   BMI 17.74 kg/m   BMI= Body mass index is 17.74 kg/m .  GENERAL APPEARANCE:  Alert, in no distress, resting in bed  ENT:  Mouth and posterior oropharynx normal, dry mucous membranes, [place]  EYES:  EOM, conjunctivae slight red of left eye and lid, pupils and irises normal  RESP:  respiratory effort and palpation of chest normal, lungs grossly clear but diminished   CV:  Palpation and auscultation of heart done , trace edema, grade 2/6 murmur, irregular rhythm, regular rate  ABDOMEN: soft, nontender, hypoactive bowel sounds  M/S: transfers with boby, now with EZ Stand  SKIN: Overall intact few areas with scabs, scarring marks from previous scratching  NEURO:   Examination of sensation by touch normal  PSYCH:  insight and judgement impaired, memory impaired     Lab/Diagnostic data:     CBC RESULTS:   Recent Labs   Lab Test 09/10/18  1425 08/27/18  0724 08/26/18  0602   WBC 6.6  --  9.2   RBC 3.94  --  3.06*   HGB 12.1  --  9.8*   HCT 38.7  --  31.2*   MCV 98  --  102*    MCH 30.7  --  32.0   MCHC 31.3*  --  31.4*   RDW 13.9  --  13.1    191 179       Last Basic Metabolic Panel:  Recent Labs   Lab Test 09/10/18  1425 08/26/18  0602    141   POTASSIUM 4.3 4.1   CHLORIDE 106 112*   BOLA 9.0 7.9*   CO2 26 22   BUN 21 25   CR 0.66 0.75   GLC 94 85       Liver Function Studies -   Recent Labs   Lab Test 04/26/17 12/05/16   PROTTOTAL 6.2* 6.5   ALBUMIN 3.1* 3.2   BILITOTAL 0.4 0.6   ALKPHOS 123 143   AST 22 22   ALT 18 15       TSH   Date Value Ref Range Status   08/23/2018 1.48 0.40 - 4.00 mU/L Final   04/26/2017 1.89 0.40 - 4.00 mU/L Final       No results found for: A1C    ASSESSMENT/PLAN  (I10) Benign essential hypertension  (primary encounter diagnosis)  (I48.0) Paroxysmal atrial fibrillation (H)  (Z86.73) H/O: CVA (cerebrovascular accident)  Comment: Chronic and Stable  Plan:   -VS BID with medication pass  -Weights monthly  -Metoprolol 12.5 mg po BID  -BMP periodically     (E03.9) Acquired hypothyroidism  Comment:   Plan:   -Annual TSH  -Levothyroxine 50 mcg po daily    (Z98.890) History of hip surgery  Comment: No longer ambulating but is able is WBAT  Plan:   -Scheduled Tylenol for pain and comfort    (R21) Rash and nonspecific skin eruption  Comment: Chronic but stable  Plan:   -Calmoseptine to coccyx  BID and prn  -Cotton gloves overnights  -Sarna lotion to body is discontinued. Monitor for need to restart    (R19.5) Loose stools  Comment: History of constipation  Plan:   -Bowel medications to prn- previously reduced to once daily    (F01.50) Vascular dementia without behavioral disturbance  Comment: Advanced  Plan:   -Supportive cares for all ADLs          Electronically signed by:  HIGINIO Pena CNP

## 2019-02-10 DIAGNOSIS — R52 GENERALIZED PAIN: Primary | ICD-10-CM

## 2019-02-10 RX ORDER — ACETAMINOPHEN 325 MG/1
TABLET ORAL
Qty: 124 TABLET | Refills: 98 | Status: SHIPPED | OUTPATIENT
Start: 2019-02-10 | End: 2019-11-05

## 2019-02-25 ENCOUNTER — ASSISTED LIVING VISIT (OUTPATIENT)
Dept: GERIATRICS | Facility: CLINIC | Age: 84
End: 2019-02-25
Payer: MEDICARE

## 2019-02-25 VITALS — SYSTOLIC BLOOD PRESSURE: 132 MMHG | DIASTOLIC BLOOD PRESSURE: 66 MMHG | HEART RATE: 76 BPM

## 2019-02-25 DIAGNOSIS — E03.9 ACQUIRED HYPOTHYROIDISM: ICD-10-CM

## 2019-02-25 DIAGNOSIS — L29.9 PRURITIC DISORDER: ICD-10-CM

## 2019-02-25 DIAGNOSIS — I69.359 HEMIPARESIS FOLLOWING CEREBROVASCULAR ACCIDENT (CVA) (H): ICD-10-CM

## 2019-02-25 DIAGNOSIS — F01.50 VASCULAR DEMENTIA WITHOUT BEHAVIORAL DISTURBANCE (H): ICD-10-CM

## 2019-02-25 DIAGNOSIS — Z86.73 H/O: CVA (CEREBROVASCULAR ACCIDENT): ICD-10-CM

## 2019-02-25 DIAGNOSIS — I10 BENIGN ESSENTIAL HYPERTENSION: ICD-10-CM

## 2019-02-25 DIAGNOSIS — I48.0 PAROXYSMAL ATRIAL FIBRILLATION (H): Primary | ICD-10-CM

## 2019-02-25 NOTE — LETTER
2/25/2019        RE: April Donnelly  [facility]  [address] [facility] [address]        April Donnelly is a 89 year old female seen February 25, 2019 at [facility] unit where she has resided for 2 years (admit 11/2016) seen to follow up HTN, dementia and right hip fracture 6 months ago.   Patient is seen on the unit, up to high-backed WC.  Very quiet, occ short answers to questions, but not able to give much meaningful history.   Patient has had multiple falls, and suffered acetabular and hip fracture in 1/2017.   She had another fall in the DR here in August 2018, and suffered a proximal right femur fracture for which she underwent a right bipolar hemiarthroplasty.  She had PHYSICAL THERAPY, but has not ambulated since then.    Transfers with EZ stand and assist of two.     Patient had overall decline, decreased intake at the time of her hip fracture, but stabilized on her own.   Did not enroll in Hospice.     She also had atrial fib recognized during hospitalization, with unremarkable ECHO.   She is not anticoagulated secondary to h/o hemorrhagic stroke and multiple falls.      She has had some scratching behaviors and picking at her skin, somewhat intractable to tx that has included steroid creams and prednisone taper.  Wears cotton gloves at night.         Past Medical History:   Diagnosis Date     Adjustment disorder with mixed anxiety and depressed mood 12/2/2016     Adjustment disorder with mixed anxiety and depressed mood 12/2/2016     Cerebrovascular accident (H) 12/2/2016     CVA (cerebral vascular accident) (H)      Hemorrhagic stroke (H) 12/2/2016     Hypertensive heart disease without heart failure 12/2/2016     Severe dementia 12/2/2016     Syncope 12/2/2016     Thyroid disease     Atrial fibrillation   Multiple falls  Acetabular hip fracture, 2017  Right proximal femur fracture, 2018      SH:   .      She has 3 sons Woody Bolton and Reji.       ROS:   Limited, but negative there than above  Wt Readings from Last 5 Encounters:   01/08/19 44 kg (97 lb)   11/27/18 47.4 kg (104 lb 8 oz)   10/25/18 47.4 kg (104 lb 8 oz)   09/25/18 43.5 kg (96 lb)   08/23/18 49.3 kg (108 lb 9.6 oz)      EXAM:  Pleasant, NAD, frail  /66   Pulse 76    Neck supple without adenopathy  Lungs with decreased BS, no rales or wheeze  Heart RRR s1s2 at 55     Abd soft, NT, no distention, +BS  Ext without edema  Skin: excoriated areas on ext, especially forearms   Neuro: limited verbal skills, no tremor or stiffness  Psych: affect okay, confused but smiling.     Labs reviewed.       IMP/PLAN:   (I48.0) Paroxysmal atrial fibrillation (H)    Comment:   Pulse Readings from Last 4 Encounters:   02/24/19 76   01/08/19 56   11/27/18 78   10/25/18 79      Plan: continue metoprolol for VR control.   Not anticoagulated secondary to h/o hemorrhagic stroke and multiple falls with injury.      (Z86.73) H/O: CVA (cerebrovascular accident)  (I69.359) Hemiparesis following cerebrovascular accident (CVA) (H)  Comment: limited mobility     Plan: assist with transfers and all mobility      (I10) Benign essential hypertension  Comment:   BP Readings from Last 3 Encounters:   02/24/19 132/66   01/08/19 115/65   11/27/18 145/84     Plan: continue metoprolol       (L29.9) Pruritic disorder  Comment: scratching behaviors, no clear etiology     Plan: can use low dose hydroxyzine if worsens.   Minimize meds, local creams, cotton gloves at night.       (F01.50) Vascular dementia without behavioral disturbance  Comment: ongoing decline with loss of language and mobility  Plan: [facility] unit for meds, meals, activity, safety      (E03.9) Acquired hypothyroidism  Comment:   TSH   Date Value Ref Range Status   08/23/2018 1.48 0.40 - 4.00 mU/L Final    Plan: yearly TSH     AD: DNR/DNI, comfort focus; Hospice evaluation if she has a downturn.         Teresa Muse MD         Sincerely,        Teresa Muse MD

## 2019-02-25 NOTE — PROGRESS NOTES
April Donnelly is a 89 year old female seen February 25, 2019 at [facility] unit where she has resided for 2 years (admit 11/2016) seen to follow up HTN, dementia and right hip fracture 6 months ago.   Patient is seen on the unit, up to high-backed WC.  Very quiet, occ short answers to questions, but not able to give much meaningful history.   Patient has had multiple falls, and suffered acetabular and hip fracture in 1/2017.   She had another fall in the DR here in August 2018, and suffered a proximal right femur fracture for which she underwent a right bipolar hemiarthroplasty.  She had PHYSICAL THERAPY, but has not ambulated since then.    Transfers with EZ stand and assist of two.     Patient had overall decline, decreased intake at the time of her hip fracture, but stabilized on her own.   Did not enroll in Hospice.     She also had atrial fib recognized during hospitalization, with unremarkable ECHO.   She is not anticoagulated secondary to h/o hemorrhagic stroke and multiple falls.      She has had some scratching behaviors and picking at her skin, somewhat intractable to tx that has included steroid creams and prednisone taper.  Wears cotton gloves at night.         Past Medical History:   Diagnosis Date     Adjustment disorder with mixed anxiety and depressed mood 12/2/2016     Adjustment disorder with mixed anxiety and depressed mood 12/2/2016     Cerebrovascular accident (H) 12/2/2016     CVA (cerebral vascular accident) (H)      Hemorrhagic stroke (H) 12/2/2016     Hypertensive heart disease without heart failure 12/2/2016     Severe dementia 12/2/2016     Syncope 12/2/2016     Thyroid disease     Atrial fibrillation   Multiple falls  Acetabular hip fracture, 2017  Right proximal femur fracture, 2018      SH:   .      She has 3 sons Che, Woody and Reji.       ROS:  Limited, but negative there than above  Wt Readings from Last 5 Encounters:   01/08/19 44 kg (97 lb)   11/27/18 47.4 kg  (104 lb 8 oz)   10/25/18 47.4 kg (104 lb 8 oz)   09/25/18 43.5 kg (96 lb)   08/23/18 49.3 kg (108 lb 9.6 oz)      EXAM:  Pleasant, NAD, frail  /66   Pulse 76    Neck supple without adenopathy  Lungs with decreased BS, no rales or wheeze  Heart RRR s1s2 at 55     Abd soft, NT, no distention, +BS  Ext without edema  Skin: excoriated areas on ext, especially forearms   Neuro: limited verbal skills, no tremor or stiffness  Psych: affect okay, confused but smiling.     Labs reviewed.       IMP/PLAN:   (I48.0) Paroxysmal atrial fibrillation (H)    Comment:   Pulse Readings from Last 4 Encounters:   02/24/19 76   01/08/19 56   11/27/18 78   10/25/18 79      Plan: continue metoprolol for VR control.   Not anticoagulated secondary to h/o hemorrhagic stroke and multiple falls with injury.      (Z86.73) H/O: CVA (cerebrovascular accident)  (I69.359) Hemiparesis following cerebrovascular accident (CVA) (H)  Comment: limited mobility     Plan: assist with transfers and all mobility      (I10) Benign essential hypertension  Comment:   BP Readings from Last 3 Encounters:   02/24/19 132/66   01/08/19 115/65   11/27/18 145/84     Plan: continue metoprolol       (L29.9) Pruritic disorder  Comment: scratching behaviors, no clear etiology     Plan: can use low dose hydroxyzine if worsens.   Minimize meds, local creams, cotton gloves at night.       (F01.50) Vascular dementia without behavioral disturbance  Comment: ongoing decline with loss of language and mobility  Plan: [facility] unit for meds, meals, activity, safety      (E03.9) Acquired hypothyroidism  Comment:   TSH   Date Value Ref Range Status   08/23/2018 1.48 0.40 - 4.00 mU/L Final    Plan: yearly TSH     AD: DNR/DNI, comfort focus; Hospice evaluation if she has a downturn.         Teresa Muse MD

## 2019-04-02 ENCOUNTER — ASSISTED LIVING VISIT (OUTPATIENT)
Dept: GERIATRICS | Facility: CLINIC | Age: 84
End: 2019-04-02
Payer: MEDICARE

## 2019-04-02 VITALS
OXYGEN SATURATION: 99 % | WEIGHT: 95.4 LBS | BODY MASS INDEX: 17.44 KG/M2 | SYSTOLIC BLOOD PRESSURE: 130 MMHG | RESPIRATION RATE: 16 BRPM | TEMPERATURE: 98 F | DIASTOLIC BLOOD PRESSURE: 78 MMHG | HEART RATE: 62 BPM

## 2019-04-02 DIAGNOSIS — I10 BENIGN ESSENTIAL HYPERTENSION: ICD-10-CM

## 2019-04-02 DIAGNOSIS — I69.359 HEMIPARESIS FOLLOWING CEREBROVASCULAR ACCIDENT (CVA) (H): ICD-10-CM

## 2019-04-02 DIAGNOSIS — F01.50 VASCULAR DEMENTIA WITHOUT BEHAVIORAL DISTURBANCE (H): ICD-10-CM

## 2019-04-02 DIAGNOSIS — M62.81 MUSCLE WEAKNESS (GENERALIZED): ICD-10-CM

## 2019-04-02 DIAGNOSIS — L29.9 PRURITIC DISORDER: ICD-10-CM

## 2019-04-02 DIAGNOSIS — I48.20 CHRONIC A-FIB (H): Primary | ICD-10-CM

## 2019-04-02 NOTE — LETTER
4/2/2019        RE: April Donnelly  [facility]  [address] [facility] [address]        [place] [facility]  [place] [facility] Record Number:  [id number]  Place of Service where encounter took place:  [facility] [[id number]]  Chief Complaint   Patient presents with     RECHECK       HPI:    April Donnelly  is a 89 year old (1/30/1930), who is being seen today for an episodic care visit.  HPI information obtained from: facility chart records, facility staff and [facility] chart review.     Seen today for follow up recheck. No concerns from staff. History of hypertension, a-fib, CVA-hemorrhagic stroke (2016), multiple falls acetabular and hip fracture in 1/2017. She had another fall in the dining room August 2018, and suffered a proximal right femur fracture for which she underwent a right bipolar hemiarthroplasty. Has not been ambulatory and relies on staff for most cares. EZ Stand transfers but has been gaining strength. Weight increase (18 lbs.)  but unclear if chart records are correct. History of picking at skin- no recent rashes/eruptions and use of cotton gloves overnight. Daily bowel movements recorded.        Past Medical and Surgical History reviewed in Epic today.    MEDICATIONS:  Current Outpatient Medications   Medication Sig Dispense Refill     camphor-menthol (DERMASARRA) 0.5-0.5 % LOTN Apply lotion to body daily 1 Bottle 11     docusate sodium (COLACE) 50 MG capsule Take 50 mg by mouth as needed for constipation       levothyroxine (SYNTHROID/LEVOTHROID) 50 MCG tablet TAKE 1 TABLET BY MOUTH ONCE DAILY 31 tablet 97     lidocaine (XYLOCAINE) 5 % ointment Apply topically daily as needed        MAPAP 325 MG tablet TAKE TWO TABLETS (650MG) BY MOUTH TWICE DAILY;AND MAY TAKE TWO TABLETS (650MG) BY MOUTH ONCE DAILY AS NEEDED FOR PAIN OR FEVER 124 tablet 98     Menthol-Zinc Oxide (CALMOSEPTINE) 0.44-20.6 % OINT Externally apply 71 g topically 2 times  daily APPLY TOPICALLY TO COCCYX TWICE DAILY AND WITH ALL INCONTINENT CHANGES FOR SKIN ITCH AND BREAKDOWN 113 g 11     metoprolol tartrate (LOPRESSOR) 25 MG tablet TAKE ONE-HALF TABLET (12.5MG) BY MOUTH TWICE DAILY * OK TO CRUSH * 31 tablet 98     senna (SENOKOT) 8.6 MG tablet Take 1 tablet by mouth daily as needed          REVIEW OF SYSTEMS:  Unobtainable secondary to cognitive impairment.     Objective:  /78   Pulse 62   Temp 98  F (36.7  C)   Resp 16   Wt 55.3 kg (122 lb)   SpO2 99%   BMI 22.31 kg/m     Exam:   GENERAL APPEARANCE:  Alert, in no distress, resting in bed  ENT:  Mouth and posterior oropharynx normal, dry mucous membranes, [place]  EYES:  EOM, conjunctivae normal,  pupils and irises normal  RESP:  respiratory effort and palpation of chest normal, lungs grossly clear but diminished   CV:  Palpation and auscultation of heart done , trace edema, grade 2/6 murmur, irregular rhythm, regular rate  ABDOMEN: soft, nontender, hypoactive bowel sounds  M/S: transfers with boby, now with EZ Stand  SKIN: Overall intact few areas with scabs, scarring marks from previous scratching  NEURO:   Examination of sensation by touch normal  PSYCH:  insight and judgement impaired, memory impaired     Labs:   Recent labs in [facility] reviewed by me today.     ASSESSMENT/PLAN:  (I48.2) Chronic a-fib (H)  (primary encounter diagnosis)  (I10) Benign essential hypertension  (I69.359) Hemiparesis following cerebrovascular accident (CVA) (H)  Comment: Chronic and stable  Plan:   -Reduce VS to monthly with weights  -metoprolol 12.5 mg po BID      (L29.9) Pruritic disorder  Comment: Flares at times  Plan:   -Dermasarra daily  -lidocaine ointment prn  -Calmoseptine to coccyx  -Cotton gloves overnight    (M62.81) Muscle weakness  Comment: Slow gain in strength   Plan:  -[facility] to eval for strength and transfers with EZ Stand versus SBA    (F01.50) Vascular dementia without behavioral disturbance  Comment: Advanced  Plan:    -Supportive cares for ADLs          Electronically signed by:  HIGINIO Pena CNP            Sincerely,        HIGINIO Pena CNP

## 2019-04-02 NOTE — PROGRESS NOTES
[place] [facility]  [place] [facility] Record Number:  [id number]  Place of Service where encounter took place:  [facility] [[id number]]  Chief Complaint   Patient presents with     RECHECK       HPI:    April Donnelly  is a 89 year old (1/30/1930), who is being seen today for an episodic care visit.  HPI information obtained from: facility chart records, facility staff and [facility] chart review.     Seen today for follow up recheck. No concerns from staff. History of hypertension, a-fib, CVA-hemorrhagic stroke (2016), multiple falls acetabular and hip fracture in 1/2017. She had another fall in the dining room August 2018, and suffered a proximal right femur fracture for which she underwent a right bipolar hemiarthroplasty. Has not been ambulatory and relies on staff for most cares. EZ Stand transfers but has been gaining strength. Weight decrease to 95 lbs. History of picking at skin- no recent rashes/eruptions and use of cotton gloves overnight. Daily bowel movements recorded.        Past Medical and Surgical History reviewed in Epic today.    MEDICATIONS:  Current Outpatient Medications   Medication Sig Dispense Refill     camphor-menthol (DERMASARRA) 0.5-0.5 % LOTN Apply lotion to body daily 1 Bottle 11     docusate sodium (COLACE) 50 MG capsule Take 50 mg by mouth as needed for constipation       levothyroxine (SYNTHROID/LEVOTHROID) 50 MCG tablet TAKE 1 TABLET BY MOUTH ONCE DAILY 31 tablet 97     lidocaine (XYLOCAINE) 5 % ointment Apply topically daily as needed        MAPAP 325 MG tablet TAKE TWO TABLETS (650MG) BY MOUTH TWICE DAILY;AND MAY TAKE TWO TABLETS (650MG) BY MOUTH ONCE DAILY AS NEEDED FOR PAIN OR FEVER 124 tablet 98     Menthol-Zinc Oxide (CALMOSEPTINE) 0.44-20.6 % OINT Externally apply 71 g topically 2 times daily APPLY TOPICALLY TO COCCYX TWICE DAILY AND WITH ALL INCONTINENT CHANGES FOR SKIN ITCH AND BREAKDOWN 113 g 11     metoprolol tartrate (LOPRESSOR) 25 MG  tablet TAKE ONE-HALF TABLET (12.5MG) BY MOUTH TWICE DAILY * OK TO CRUSH * 31 tablet 98     senna (SENOKOT) 8.6 MG tablet Take 1 tablet by mouth daily as needed          REVIEW OF SYSTEMS:  Unobtainable secondary to cognitive impairment.     Objective:  /78   Pulse 62   Temp 98  F (36.7  C)   Resp 16   Wt 55.3 kg (122 lb)   SpO2 99%   BMI 22.31 kg/m    Exam:   GENERAL APPEARANCE:  Alert, in no distress, resting in bed  ENT:  Mouth and posterior oropharynx normal, dry mucous membranes, [place]  EYES:  EOM, conjunctivae normal,  pupils and irises normal  RESP:  respiratory effort and palpation of chest normal, lungs grossly clear but diminished   CV:  Palpation and auscultation of heart done , trace edema, grade 2/6 murmur, irregular rhythm, regular rate  ABDOMEN: soft, nontender, hypoactive bowel sounds  M/S: transfers with boby, now with EZ Stand  SKIN: Overall intact few areas with scabs, scarring marks from previous scratching  NEURO:   Examination of sensation by touch normal  PSYCH:  insight and judgement impaired, memory impaired     Labs:   Recent labs in [facility] reviewed by me today.     ASSESSMENT/PLAN:  (I48.2) Chronic a-fib (H)  (primary encounter diagnosis)  (I10) Benign essential hypertension  (I69.359) Hemiparesis following cerebrovascular accident (CVA) (H)  Comment: Chronic and stable  Plan:   -Reduce VS to monthly with weights  -metoprolol 12.5 mg po BID      (L29.9) Pruritic disorder  Comment: Flares at times  Plan:   -Dermasarra daily  -lidocaine ointment prn  -Calmoseptine to coccyx  -Cotton gloves overnight    (M62.81) Muscle weakness  Comment: Slow gain in strength   Plan:  -[facility] to eval for strength and transfers with EZ Stand versus SBA    (F01.50) Vascular dementia without behavioral disturbance  Comment: Advanced  Plan:   -Supportive cares for ADLs          Electronically signed by:  HIGINIO Pena CNP

## 2019-05-11 ENCOUNTER — APPOINTMENT (OUTPATIENT)
Dept: CT IMAGING | Facility: CLINIC | Age: 84
End: 2019-05-11
Attending: NURSE PRACTITIONER
Payer: MEDICARE

## 2019-05-11 ENCOUNTER — HOSPITAL ENCOUNTER (EMERGENCY)
Facility: CLINIC | Age: 84
Discharge: HOME | End: 2019-05-11
Attending: NURSE PRACTITIONER | Admitting: NURSE PRACTITIONER
Payer: MEDICARE

## 2019-05-11 VITALS
OXYGEN SATURATION: 100 % | HEART RATE: 72 BPM | SYSTOLIC BLOOD PRESSURE: 140 MMHG | TEMPERATURE: 98.7 F | DIASTOLIC BLOOD PRESSURE: 89 MMHG | RESPIRATION RATE: 16 BRPM
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
## 2019-05-11 DIAGNOSIS — S09.90XA MINOR HEAD INJURY, INITIAL ENCOUNTER: ICD-10-CM

## 2019-05-11 DIAGNOSIS — S00.03XA HEMATOMA OF SCALP, INITIAL ENCOUNTER: ICD-10-CM

## 2019-05-11 DIAGNOSIS — G93.9 BRAIN LESION: ICD-10-CM

## 2019-05-11 PROCEDURE — 70450 CT HEAD/BRAIN W/O DYE: CPT

## 2019-05-11 PROCEDURE — 99284 EMERGENCY DEPT VISIT MOD MDM: CPT | Mod: 25

## 2019-05-11 ASSESSMENT — ENCOUNTER SYMPTOMS
DIARRHEA: 0
NAUSEA: 0
CONFUSION: 1
FEVER: 0
CHILLS: 0
SHORTNESS OF BREATH: 0
VOMITING: 0
ROS SKIN COMMENTS: LEFT FRONTAL HEMATOMA

## 2019-05-11 NOTE — ED PROVIDER NOTES
History     Chief Complaint:  Fall    HPI   History primarily provided by care facility.  April Donnelly is a 89 year old female who presents with fall. The patient lives at a care facility where she fell while transferring from her bed to a wheel chair, with the assistance of care staff. She did hit her head, but did not lose consciousness. The patient is not on blood thinners.    Allergies:  Hydrocodone  Lisinopril  Terazosin     Medications:    Dermasarra   Colace  Synthroid  Xylocaine  Mapap  Calmoseptine  Lopressor   Senokot    Past Medical History:     Adjustment disorder with mixed anxiety and depressed mood   Cerebrovascular accident   Hemorrhagic stroke   Hypertensive heart disease without heart failure   Severe dementia   Syncope   Thyroid disease  Total hip arthroplasty   Vascular dementia     Past Surgical History:    Open Reduction Internal Fixation Hip Bipolar  Thyroidectomy     Family History:    History reviewed. No pertinent family history.     Social History:  Smoking Status: Never Smoker  Alcohol Use: No  Patient presents alone.  Marital Status:       Review of Systems   Constitutional: Negative for chills and fever.   Respiratory: Negative for shortness of breath.    Cardiovascular: Negative for chest pain.   Gastrointestinal: Negative for diarrhea, nausea and vomiting.   Skin:        Left frontal hematoma   Neurological: Negative for syncope.   Psychiatric/Behavioral: Positive for confusion (Baseline).   All other systems reviewed and are negative.    As provided per EMS report per family and care facility.    Physical Exam   First Vitals:  BP: 132/79  Heart Rate: 68  Temp: 98.7  F (37.1  C)  Resp: 18  SpO2: 100 %      Physical Exam  General: Alert, No obvious discomfort, well kept  Eyes: PERRL, conjunctivae pink no scleral icterus or conjunctival injection, non-tender orbits and zygomatic arch, normal EOM  HENT:  Moist mucus membranes, posterior oropharynx clear without erythema or  exudates, No lymphadenopathy, Normal voice. Left forehead/scalp hematoma with mild tenderness.   Resp:  Lungs clear to auscultation bilaterally, no crackles/rubs/wheezes. Good air movement, non-tender chest wall  CV:  Normal rate and rhythm, no murmurs/rubs/gallops  GI:  Abdomen soft and non-distended.  Normoactive BS.  No tenderness, guarding or rebound, No masses, no bruising, non tender CVA  Skin:  Warm, dry.  No rashes or petechiae  Musculoskeletal: No peripheral edema or calf tenderness, Normal gross ROM of all extremities, stable pelvis, Normal gross ROM No midline tenderness of the cervical/thoracic/lumbar spine, No tenderness/crepitus/bony stepoffs over the clavicles, chest wall, arms or legs.  Neuro: Baseline mental status is confused.  Psychiatric: Normal affect, cooperative, good eye contact  Emergency Department Course     Imaging:  Radiographic findings were communicated with the patient who voiced understanding of the findings.  Head CT w/o contrast  1. No intracranial hemorrhage or skull fractures.  2. Small low dense lesion in the right parietal region. This involves  the cortex and subcortical white matter. There are small associated  calcifications. This is probably due to a chronic infarct but  comparison to any old scans or old CT reports would be helpful to  confirm that this has remained stable.  3. There is diffuse parenchymal volume loss.  White matter changes are  present in the cerebral hemispheres that are consistent with small  vessel ischemic disease in this age patient.  As read by Radiology.    Emergency Department Course:  Past medical records, nursing notes, and vitals reviewed.  1036: I performed an exam of the patient and obtained history, as documented above.    The patient was sent for a head CT while in the emergency department, findings above.    1142: I rechecked the patient. Findings and plan explained to the Patient. Patient discharged home with instructions regarding  supportive care, medications, and reasons to return. The importance of close follow-up was reviewed.      Impression & Plan      Medical Decision Making:  April Donnelly is a 89 year old female who presents today for evaluation of head injury after fall.  Patient was being transferred between chair and bed via staff at her care facility she slipped out of their hands and struck her head on the bed.  She was then sent here for further evaluation.  Her family members she is at her baseline mental status.  CT was obtained without indication for acute intracranial process.  She does have a lesion that was unable to be compared to previous scans at this time.  Family thought that this may have been old but they were uncertain.  I discussed the need to follow-up with primary care provider regarding this.  As this was a simple mechanical type fall there is no indication for further testing or imagery.  The remainder of my head to toe exam showed no evidence of trauma and did not elicit any pain.  She appears to be safe and appropriate for return to her memory care unit.  She is discharged home and is transported via EMS.      Diagnosis:    ICD-10-CM    1. Fall, initial encounter W19.XXXA    2. Minor head injury, initial encounter S09.90XA    3. Hematoma of scalp, initial encounter S00.03XA    4. Brain lesion G93.9        Disposition:  discharged to care facility    Discharge Medications:     Medication List      There are no discharge medications for this visit.         Merissa Zarco  5/11/2019   [facility] EMERGENCY DEPARTMENT  I, Merissa Zarco, am serving as a scribe at 10:36 AM on 5/11/2019 to document services personally performed by Davon Garcia APRN based on my observations and the provider's statements to me.          Davon Garcia APRN CNP  05/11/19 1808

## 2019-05-11 NOTE — DISCHARGE INSTRUCTIONS
Follow up with PCP and confirm Brain lesion as old/unchanged otherwise will need follow up CT.    Discharge Instructions  Head Injury    You have been seen today for a head injury. Your evaluation included a history and physical examination. You may have had a CT (CAT) scan performed, though most head injuries do not require a scan. Based on this evaluation, your provider today does not feel that your head injury is serious.    Generally, every Emergency Department visit should have a follow-up clinic visit with either a primary or a specialty clinic/provider. Please follow-up as instructed by your emergency provider today.  Return to the Emergency Department if:  You are confused or you are not acting right.  Your headache gets worse or you start to have a really bad headache even with your recommended treatment plan.  You vomit (throw up) more than once.  You have a seizure.  You have trouble walking.  You have weakness or paralysis (cannot move) in an arm or a leg.  You have blood or fluid coming from your ears or nose.  You have new symptoms or anything that worries you.    Sleeping:  It is okay for you to sleep, but someone should wake you up if instructed by your provider, and someone should check on you at your usual time to wake up.     Activity:  Do not drive for at least 24 hours.  Do not drive if you have dizzy spells or trouble concentrating, or remembering things.  Do not return to any contact sports until cleared by your regular provider.     MORE INFORMATION:    Concussion:  A concussion is a minor head injury that may cause temporary problems with the way the brain works. Although concussions are important, they are generally not an emergency or a reason that a person needs to be hospitalized. Some concussion symptoms include confusion, amnesia (forgetful), nausea (sick to your stomach) and vomiting (throwing up), dizziness, fatigue, memory or concentration problems, irritability and sleep problems.  For most people, concussions are mild and temporary but some will have more severe and persistent symptoms that require on-going care and treatment.  CT Scans: Your evaluation today may have included a CT scan (CAT scan) to look for things like bleeding or a skull fracture (broken bone).  CT scans involve radiation and too many CT scans can cause serious health problems like cancer, especially in children.  Because of this, your provider may not have ordered a CT scan today if they think you are at low risk for a serious or life threatening problem.    If you were given a prescription for medicine here today, be sure to read all of the information (including the package insert) that comes with your prescription.  This will include important information about the medicine, its side effects, and any warnings that you need to know about.  The pharmacist who fills the prescription can provide more information and answer questions you may have about the medicine.  If you have questions or concerns that the pharmacist cannot address, please call or return to the Emergency Department.     Remember that you can always come back to the Emergency Department if you are not able to see your regular provider in the amount of time listed above, if you get any new symptoms, or if there is anything that worries you.

## 2019-05-11 NOTE — ED AVS SNAPSHOT
[facility] Emergency Department  [address]  [facility] [address]  Phone:  [phone]  Fax:  [phone]                                    April Donnelly   MRN: [id number]    Department:  [facility] Emergency Department   Date of Visit:  5/11/2019           After Visit Summary Signature Page    I have received my discharge instructions, and my questions have been answered. I have discussed any challenges I see with this plan with the nurse or doctor.    ..........................................................................................................................................  Patient/Patient Representative Signature      ..........................................................................................................................................  Patient Representative Print Name and Relationship to Patient    ..................................................               ................................................  Date                                   Time    ..........................................................................................................................................  Reviewed by Signature/Title    ...................................................              ..............................................  Date                                               Time          22EPIC Rev 08/18

## 2019-05-17 DIAGNOSIS — R21 RASH: ICD-10-CM

## 2019-05-17 RX ORDER — ANORECTAL OINTMENT 15.7; .44; 24; 20.6 G/100G; G/100G; G/100G; G/100G
OINTMENT TOPICAL
Qty: 113 G | Refills: 97 | Status: SHIPPED | OUTPATIENT
Start: 2019-05-17 | End: 2020-06-09

## 2019-05-21 ENCOUNTER — ASSISTED LIVING VISIT (OUTPATIENT)
Dept: GERIATRICS | Facility: CLINIC | Age: 84
End: 2019-05-21
Payer: MEDICARE

## 2019-05-21 VITALS
HEART RATE: 61 BPM | SYSTOLIC BLOOD PRESSURE: 113 MMHG | OXYGEN SATURATION: 98 % | RESPIRATION RATE: 16 BRPM | TEMPERATURE: 97.3 F | DIASTOLIC BLOOD PRESSURE: 74 MMHG

## 2019-05-21 DIAGNOSIS — I69.359 HEMIPARESIS FOLLOWING CEREBROVASCULAR ACCIDENT (CVA) (H): ICD-10-CM

## 2019-05-21 DIAGNOSIS — Z86.73 H/O: CVA (CEREBROVASCULAR ACCIDENT): ICD-10-CM

## 2019-05-21 DIAGNOSIS — I10 BENIGN ESSENTIAL HYPERTENSION: ICD-10-CM

## 2019-05-21 DIAGNOSIS — F01.50 VASCULAR DEMENTIA WITHOUT BEHAVIORAL DISTURBANCE (H): ICD-10-CM

## 2019-05-21 DIAGNOSIS — S09.90XD CLOSED HEAD INJURY, SUBSEQUENT ENCOUNTER: Primary | ICD-10-CM

## 2019-05-21 NOTE — PROGRESS NOTES
[facility]  [place] Medical Record Number:  [id number]  Place of Service where encounter took place:  [facility] [[id number]]  Chief Complaint   Patient presents with     RECHECK       HPI:    April Donnelly  is a 89 year old (1/30/1930), who is being seen today for an episodic care visit.  HPI information obtained from: facility chart records, facility staff and [facility] chart review.     Seen for recheck she has history of hypertension, a-fib, CVA-hemorrhagic stroke (2016), multiple falls acetabular and hip fracture in 1/2017. She had another fall in the dining room August 2018, and suffered a proximal right femur fracture for which she underwent a right bipolar hemiarthroplasty. Has not been ambulatory and relies on staff for most cares. EZ Stand transfers but has been gaining strength. Weight decrease to 95 lbs. History of picking at skin- no recent rashes/eruptions and use of cotton gloves overnight. Daily bowel movements recorded.    With gain of strength working with PT. Discharged on 4/30/19 from service and unfortunately suffered a fall with head strike on 5/11. Was evaluated in ED. Head CT was negative for acute finding but did see low dense lesion in right right parietal region with associated calcifications  which could be related to chronic infarct. No comparison scans/CT reports available to confirm. Unable to query Care Everywhere for any additional information. Results reviewed with family in ED and they do believe could be old but not sure. She continues back at baseline. Unable to provider any history with advanced dementia and aphasia.     Past Medical and Surgical History reviewed in Epic today.    MEDICATIONS:  Current Outpatient Medications   Medication Sig Dispense Refill     CALMOSEPTINE 0.44-20.6 % OINT ointment APPLY TOPICALLY TO COCCYX TWICE DAILY AND WITH ALL INCONTINENT CHANGES FOR SKIN ITCH AND BREAKDOWN 113 g 97     camphor-menthol  (DERMASARRA) 0.5-0.5 % LOTN Apply lotion to body daily 1 Bottle 11     docusate sodium (COLACE) 50 MG capsule Take 50 mg by mouth as needed for constipation       levothyroxine (SYNTHROID/LEVOTHROID) 50 MCG tablet TAKE 1 TABLET BY MOUTH ONCE DAILY 31 tablet 97     lidocaine (XYLOCAINE) 5 % ointment Apply topically daily as needed        MAPAP 325 MG tablet TAKE TWO TABLETS (650MG) BY MOUTH TWICE DAILY;AND MAY TAKE TWO TABLETS (650MG) BY MOUTH ONCE DAILY AS NEEDED FOR PAIN OR FEVER 124 tablet 98     metoprolol tartrate (LOPRESSOR) 25 MG tablet TAKE ONE-HALF TABLET (12.5MG) BY MOUTH TWICE DAILY * OK TO CRUSH * 31 tablet 98     senna (SENOKOT) 8.6 MG tablet Take 1 tablet by mouth daily as needed        REVIEW OF SYSTEMS:  Unobtainable secondary to cognitive impairment.  and Unobtainable secondary to aphasia.     Objective:  /74   Pulse 61   Temp 97.3  F (36.3  C)   Resp 16   SpO2 98%   Exam:  GENERAL APPEARANCE:  Alert, in no distress, in broda chair  ENT:  Mouth and posterior oropharynx normal, dry mucous membranes, [place]  EYES:  EOM, conjunctivae normal,  pupils and irises normal  RESP:  respiratory effort and palpation of chest normal, lungs grossly clear but diminished   CV:  Palpation and auscultation of heart done , trace edema, grade 2/6 murmur, irregular rhythm, regular rate  ABDOMEN: soft, nontender, hypoactive bowel sounds  M/S: passive ROM to UE and LE with no pain  SKIN: Overall intact few areas with scabs, scarring marks from previous scratching-head nodule reduced   NEURO:   Examination of sensation by touch normal  PSYCH:  insight and judgement impaired, memory impaired    Labs:   CBC RESULTS:   Recent Labs   Lab Test 09/10/18  1425 08/27/18  0724 08/26/18  0602   WBC 6.6  --  9.2   RBC 3.94  --  3.06*   HGB 12.1  --  9.8*   HCT 38.7  --  31.2*   MCV 98  --  102*   MCH 30.7  --  32.0   MCHC 31.3*  --  31.4*   RDW 13.9  --  13.1    191 179       Last Basic Metabolic Panel:  Recent Labs    Lab Test 09/10/18  1425 08/26/18  0602    141   POTASSIUM 4.3 4.1   CHLORIDE 106 112*   BOLA 9.0 7.9*   CO2 26 22   BUN 21 25   CR 0.66 0.75   GLC 94 85       Liver Function Studies -   Recent Labs   Lab Test 04/26/17 12/05/16   PROTTOTAL 6.2* 6.5   ALBUMIN 3.1* 3.2   BILITOTAL 0.4 0.6   ALKPHOS 123 143   AST 22 22   ALT 18 15       TSH   Date Value Ref Range Status   08/23/2018 1.48 0.40 - 4.00 mU/L Final   04/26/2017 1.89 0.40 - 4.00 mU/L Final       No results found for: A1C    Imaging:  Radiographic findings were communicated with the patient who voiced understanding of the findings.  Head CT w/o contrast  1. No intracranial hemorrhage or skull fractures.  2. Small low dense lesion in the right parietal region. This involves  the cortex and subcortical white matter. There are small associated  calcifications. This is probably due to a chronic infarct but  comparison to any old scans or old CT reports would be helpful to  confirm that this has remained stable.  3. There is diffuse parenchymal volume loss.  White matter changes are  present in the cerebral hemispheres that are consistent with small  vessel ischemic disease in this age patient.  As read by Radiology.    ASSESSMENT/PLAN:  (S09.90XD) Closed head injury, subsequent encounter  (primary encounter diagnosis)  Comment: Acute with fall  Plan:   -monitor for s/s of injury  -Review CT findings with family    (F01.50) Vascular dementia without behavioral disturbance  Comment: Advanced  Plan:   -Relies on memory care staff assist for all ADLs    (Z86.73) H/O: CVA (cerebrovascular accident)  (I69.359) Hemiparesis following cerebrovascular accident (CVA) (H)  Comment: Ongoing  Plan:   -Not anticoagulated 2/2 hemorrhagic stroke     (I10) Benign essential hypertension  Comment: BP average 119/57 HR 81  Plan:   -metoprolol 12.5 mg po BID  -VS and weights monthly         Electronically signed by:  HIGINIO Pena CNP

## 2019-05-21 NOTE — LETTER
5/21/2019        RE: April Donnelly  [facility]  [address] [address] [facility] [address] [address]        [place] [facility]  [place] Medical Record Number:  [id number]  Place of Service where encounter took place:  [facility] [[id number]]  Chief Complaint   Patient presents with     RECHECK       HPI:    April Donnelly  is a 89 year old (1/30/1930), who is being seen today for an episodic care visit.  HPI information obtained from: facility chart records, facility staff and [facility] chart review.     Seen for recheck she has history of hypertension, a-fib, CVA-hemorrhagic stroke (2016), multiple falls acetabular and hip fracture in 1/2017. She had another fall in the dining room August 2018, and suffered a proximal right femur fracture for which she underwent a right bipolar hemiarthroplasty. Has not been ambulatory and relies on staff for most cares. EZ Stand transfers but has been gaining strength. Weight decrease to 95 lbs. History of picking at skin- no recent rashes/eruptions and use of cotton gloves overnight. Daily bowel movements recorded.    With gain of strength working with PT. Discharged on 4/30/19 from service and unfortunately suffered a fall with head strike on 5/11. Was evaluated in ED. Head CT was negative for acute finding but did see low dense lesion in right right parietal region with associated calcifications  which could be related to chronic infarct. No comparison scans/CT reports available to confirm. Unable to query Care Everywhere for any additional information. Results reviewed with family in ED and they do believe could be old but not sure. She continues back at baseline. Unable to provider any history with advanced dementia and aphasia.     Past Medical and Surgical History reviewed in Epic today.    MEDICATIONS:  Current Outpatient Medications   Medication Sig Dispense Refill     CALMOSEPTINE 0.44-20.6 % OINT ointment APPLY TOPICALLY TO  COCCYX TWICE DAILY AND WITH ALL INCONTINENT CHANGES FOR SKIN ITCH AND BREAKDOWN 113 g 97     camphor-menthol (DERMASARRA) 0.5-0.5 % LOTN Apply lotion to body daily 1 Bottle 11     docusate sodium (COLACE) 50 MG capsule Take 50 mg by mouth as needed for constipation       levothyroxine (SYNTHROID/LEVOTHROID) 50 MCG tablet TAKE 1 TABLET BY MOUTH ONCE DAILY 31 tablet 97     lidocaine (XYLOCAINE) 5 % ointment Apply topically daily as needed        MAPAP 325 MG tablet TAKE TWO TABLETS (650MG) BY MOUTH TWICE DAILY;AND MAY TAKE TWO TABLETS (650MG) BY MOUTH ONCE DAILY AS NEEDED FOR PAIN OR FEVER 124 tablet 98     metoprolol tartrate (LOPRESSOR) 25 MG tablet TAKE ONE-HALF TABLET (12.5MG) BY MOUTH TWICE DAILY * OK TO CRUSH * 31 tablet 98     senna (SENOKOT) 8.6 MG tablet Take 1 tablet by mouth daily as needed        REVIEW OF SYSTEMS:  Unobtainable secondary to cognitive impairment.  and Unobtainable secondary to aphasia.     Objective:  /74   Pulse 61   Temp 97.3  F (36.3  C)   Resp 16   SpO2 98%   Exam:  GENERAL APPEARANCE:  Alert, in no distress, in broda chair  ENT:  Mouth and posterior oropharynx normal, dry mucous membranes, [place]  EYES:  EOM, conjunctivae normal,  pupils and irises normal  RESP:  respiratory effort and palpation of chest normal, lungs grossly clear but diminished   CV:  Palpation and auscultation of heart done , trace edema, grade 2/6 murmur, irregular rhythm, regular rate  ABDOMEN: soft, nontender, hypoactive bowel sounds  M/S: passive ROM to UE and LE with no pain  SKIN: Overall intact few areas with scabs, scarring marks from previous scratching-head nodule reduced   NEURO:   Examination of sensation by touch normal  PSYCH:  insight and judgement impaired, memory impaired    Labs:   CBC RESULTS:   Recent Labs   Lab Test 09/10/18  1425 08/27/18  0724 08/26/18  0602   WBC 6.6  --  9.2   RBC 3.94  --  3.06*   HGB 12.1  --  9.8*   HCT 38.7  --  31.2*   MCV 98  --  102*   MCH 30.7  --  32.0    MCHC 31.3*  --  31.4*   RDW 13.9  --  13.1    191 179       Last Basic Metabolic Panel:  Recent Labs   Lab Test 09/10/18  1425 08/26/18  0602    141   POTASSIUM 4.3 4.1   CHLORIDE 106 112*   BOLA 9.0 7.9*   CO2 26 22   BUN 21 25   CR 0.66 0.75   GLC 94 85       Liver Function Studies -   Recent Labs   Lab Test 04/26/17 12/05/16   PROTTOTAL 6.2* 6.5   ALBUMIN 3.1* 3.2   BILITOTAL 0.4 0.6   ALKPHOS 123 143   AST 22 22   ALT 18 15       TSH   Date Value Ref Range Status   08/23/2018 1.48 0.40 - 4.00 mU/L Final   04/26/2017 1.89 0.40 - 4.00 mU/L Final       No results found for: A1C    Imaging:  Radiographic findings were communicated with the patient who voiced understanding of the findings.  Head CT w/o contrast  1. No intracranial hemorrhage or skull fractures.  2. Small low dense lesion in the right parietal region. This involves  the cortex and subcortical white matter. There are small associated  calcifications. This is probably due to a chronic infarct but  comparison to any old scans or old CT reports would be helpful to  confirm that this has remained stable.  3. There is diffuse parenchymal volume loss.  White matter changes are  present in the cerebral hemispheres that are consistent with small  vessel ischemic disease in this age patient.  As read by Radiology.    ASSESSMENT/PLAN:  (S09.90XD) Closed head injury, subsequent encounter  (primary encounter diagnosis)  Comment: Acute with fall  Plan:   -monitor for s/s of injury  -Review CT findings with family    (F01.50) Vascular dementia without behavioral disturbance  Comment: Advanced  Plan:   -Relies on memory care staff assist for all ADLs    (Z86.73) H/O: CVA (cerebrovascular accident)  (I69.359) Hemiparesis following cerebrovascular accident (CVA) (H)  Comment: Ongoing  Plan:   -Not anticoagulated 2/2 hemorrhagic stroke     (I10) Benign essential hypertension  Comment: BP average 119/57 HR 81  Plan:   -metoprolol 12.5 mg po BID  -VS and  weights monthly         Electronically signed by:  HIGINIO Pena CNP               Sincerely,        HIGINIO Pena CNP

## 2019-05-23 DIAGNOSIS — L85.3 DRY SKIN: ICD-10-CM

## 2019-07-08 ENCOUNTER — DOCUMENTATION ONLY (OUTPATIENT)
Dept: OTHER | Facility: CLINIC | Age: 84
End: 2019-07-08

## 2019-07-08 PROBLEM — Z71.89 ADVANCE CARE PLANNING: Chronic | Status: RESOLVED | Noted: 2017-01-04 | Resolved: 2019-07-08

## 2019-07-23 ENCOUNTER — ASSISTED LIVING VISIT (OUTPATIENT)
Dept: GERIATRICS | Facility: CLINIC | Age: 84
End: 2019-07-23
Payer: MEDICARE

## 2019-07-23 VITALS
TEMPERATURE: 97.5 F | BODY MASS INDEX: 17.92 KG/M2 | DIASTOLIC BLOOD PRESSURE: 78 MMHG | HEART RATE: 69 BPM | RESPIRATION RATE: 18 BRPM | SYSTOLIC BLOOD PRESSURE: 120 MMHG | WEIGHT: 98 LBS

## 2019-07-23 DIAGNOSIS — E46 PROTEIN-CALORIE MALNUTRITION, UNSPECIFIED SEVERITY (H): ICD-10-CM

## 2019-07-23 DIAGNOSIS — L29.9 PRURITIC DISORDER: Primary | ICD-10-CM

## 2019-07-23 DIAGNOSIS — I10 BENIGN ESSENTIAL HYPERTENSION: ICD-10-CM

## 2019-07-23 NOTE — PROGRESS NOTES
[facility]  [place] Medical Record Number:  [id number]  Place of Service where encounter took place:  [facility] [[id number]]  Chief Complaint   Patient presents with     RECHECK       HPI:    April Donnelly  is a 89 year old (1/30/1930), who is being seen today for an episodic care visit.  HPI information obtained from: facility chart records and facility staff.    Seen today for follow up visit. She is sleepy at dining table. Appears trying to feed herself and is covered in food. Hard to keep awake for exam. Looks like she has lost some weight from previous visit.     PMH includes history of picking at skin. Use of cotton gloves overnight. Also reports of attempting to dig out stool. Did have several days of no recorded bowel movements and mediations for constipation made prn 2/2 loose stools. Waiting for new pair and she has been scratching skin more at night. History of hypertension, a-fib, CVA-hemorrhagic stroke (2016), multiple falls acetabular and hip fracture in 1/2017. She had another fall in the dining room August 2018, and suffered a proximal right femur fracture for which she underwent a right bipolar hemiarthroplasty. Has not been ambulatory although did work with PT again 4/2019. Last recorded fall with head strike on 5/11. Was evaluated in ED. Head CT was negative for acute finding but did see low dense lesion in right parietal region with associated calcifications  which could be related to chronic infarct. No comparison scans/CT reports available to confirm. Unable to query Care Everywhere for any additional information. Results reviewed with family in ED and they do believe could be old but not sure. She continues back at baseline. Unable to provider any history with advanced dementia and aphasia. Relies on staff for most cares.      Past Medical and Surgical History reviewed in Epic today.    MEDICATIONS:  Current Outpatient Medications   Medication Sig  Dispense Refill     CALMOSEPTINE 0.44-20.6 % OINT ointment APPLY TOPICALLY TO COCCYX TWICE DAILY AND WITH ALL INCONTINENT CHANGES FOR SKIN ITCH AND BREAKDOWN 113 g 97     camphor-menthol (SARNA) 0.5-0.5 % external lotion APPLY LOTION TO BODY DAILY 222 mL 97     docusate sodium (COLACE) 50 MG capsule Take 50 mg by mouth as needed for constipation       levothyroxine (SYNTHROID/LEVOTHROID) 50 MCG tablet TAKE 1 TABLET BY MOUTH ONCE DAILY 31 tablet 97     lidocaine (XYLOCAINE) 5 % ointment Apply topically daily as needed        MAPAP 325 MG tablet TAKE TWO TABLETS (650MG) BY MOUTH TWICE DAILY;AND MAY TAKE TWO TABLETS (650MG) BY MOUTH ONCE DAILY AS NEEDED FOR PAIN OR FEVER 124 tablet 98     metoprolol tartrate (LOPRESSOR) 25 MG tablet TAKE ONE-HALF TABLET (12.5MG) BY MOUTH TWICE DAILY * OK TO CRUSH * 31 tablet 98     senna (SENOKOT) 8.6 MG tablet Take 1 tablet by mouth daily as needed          REVIEW OF SYSTEMS:  Unobtainable secondary to cognitive impairment.  and Unobtainable secondary to aphasia.     Objective:  /78   Pulse 69   Temp 97.5  F (36.4  C)   Resp 18   Wt 44.5 kg (98 lb)   BMI 17.92 kg/m    Exam:  GENERAL APPEARANCE:  Sleepy, in no distress, in broda chair  ENT:  Mouth and posterior oropharynx normal, dry mucous membranes, [place]  EYES:  EOM, conjunctivae normal,  pupils and irises normal  RESP:  respiratory effort and palpation of chest normal, lungs grossly clear but diminished   CV:  Palpation and auscultation of heart done , trace edema, grade 2/6 murmur, irregular rhythm, regular rate  ABDOMEN: soft, nontender, bowel sounds present  M/S: passive ROM to UE and LE with no pain  SKIN: unable to examine much of skin today  NEURO: CLAUDIA  PSYCH:  insight and judgement impaired, memory impaired    Labs:   CBC RESULTS:   Recent Labs   Lab Test 09/10/18  1425 08/27/18  0724 08/26/18  0602   WBC 6.6  --  9.2   RBC 3.94  --  3.06*   HGB 12.1  --  9.8*   HCT 38.7  --  31.2*   MCV 98  --  102*   MCH 30.7  --   32.0   MCHC 31.3*  --  31.4*   RDW 13.9  --  13.1    191 179       Last Basic Metabolic Panel:  Recent Labs   Lab Test 09/10/18  1425 08/26/18  0602    141   POTASSIUM 4.3 4.1   CHLORIDE 106 112*   BOLA 9.0 7.9*   CO2 26 22   BUN 21 25   CR 0.66 0.75   GLC 94 85       Liver Function Studies -   Recent Labs   Lab Test 04/26/17 12/05/16   PROTTOTAL 6.2* 6.5   ALBUMIN 3.1* 3.2   BILITOTAL 0.4 0.6   ALKPHOS 123 143   AST 22 22   ALT 18 15       TSH   Date Value Ref Range Status   08/23/2018 1.48 0.40 - 4.00 mU/L Final   04/26/2017 1.89 0.40 - 4.00 mU/L Final       No results found for: A1C      ASSESSMENT/PLAN:  (L29.9) Pruritic disorder  (primary encounter diagnosis)  Comment: Ongoing  Plan:   -continue current plan of care  -monitor for open areas  -consider constipation as contributor to digging behaviors  -discontinue lidocaine cream prn    (E46) Protein-calorie malnutrition, unspecified severity (H)  Comment: Appears with weight loss  Plan:   -updated weight for 7/2019  -consider staff assist with meals  -check TSH    (I10) Benign essential hypertension  Comment: Stable  Plan:   -continue current plan of care  -updated labs          Electronically signed by:  HIGINIO Pena CNP

## 2019-07-24 PROBLEM — E46 PROTEIN-CALORIE MALNUTRITION (H): Status: ACTIVE | Noted: 2019-07-24

## 2019-07-25 ENCOUNTER — TRANSFERRED RECORDS (OUTPATIENT)
Dept: HEALTH INFORMATION MANAGEMENT | Facility: CLINIC | Age: 84
End: 2019-07-25

## 2019-07-25 LAB
ALBUMIN SERPL-MCNC: 3.5 G/DL (ref 3.4–5)
ALP SERPL-CCNC: 109 U/L (ref 40–150)
ALT SERPL-CCNC: 14 U/L (ref 0–45)
ANION GAP SERPL CALCULATED.3IONS-SCNC: 7 MMOL/L (ref 3–14)
AST SERPL-CCNC: 20 U/L (ref 0–50)
BILIRUB SERPL-MCNC: 0.6 MG/DL (ref 0.2–1.3)
BILIRUBIN DIRECT: 0.1 MG/DL (ref 0–0.2)
BUN SERPL-MCNC: 21 MG/DL (ref 7–30)
CALCIUM SERPL-MCNC: 9.2 MG/DL (ref 8.5–10.1)
CHLORIDE SERPLBLD-SCNC: 108 MMOL/L (ref 94–109)
CO2 SERPL-SCNC: 27 MMOL/L (ref 20–32)
CREAT SERPL-MCNC: 0.59 MG/DL (ref 0.52–1.04)
ERYTHROCYTE [DISTWIDTH] IN BLOOD BY AUTOMATED COUNT: 12.9 % (ref 10–15)
GFR SERPL CREATININE-BSD FRML MDRD: 81 ML/MIN/1.73_M2
GLUCOSE SERPL-MCNC: 70 MG/DL (ref 70–99)
HCT VFR BLD AUTO: 40.7 % (ref 35–47)
HEMOGLOBIN: 13.7 G/DL (ref 11.7–15.7)
MCH RBC QN AUTO: 31.1 PG (ref 26.5–33)
MCHC RBC AUTO-ENTMCNC: 33.7 G/DL (ref 31.5–36.5)
MCV RBC AUTO: 92 FL (ref 78–100)
PLATELET # BLD AUTO: 194 10E9/L (ref 150–450)
POTASSIUM SERPL-SCNC: 3.7 MMOL/L (ref 3.4–5.3)
PROT SERPL-MCNC: 6.6 G/DL (ref 6.8–8.8)
RBC # BLD AUTO: 4.41 10E12/L (ref 3.8–5.2)
SODIUM SERPL-SCNC: 142 MMOL/L (ref 133–144)
WBC # BLD AUTO: 6 10E9/L (ref 4–11)

## 2019-08-06 VITALS
HEART RATE: 69 BPM | WEIGHT: 105 LBS | BODY MASS INDEX: 19.2 KG/M2 | DIASTOLIC BLOOD PRESSURE: 78 MMHG | RESPIRATION RATE: 18 BRPM | TEMPERATURE: 97.5 F | SYSTOLIC BLOOD PRESSURE: 120 MMHG

## 2019-08-26 DIAGNOSIS — E03.8 OTHER SPECIFIED HYPOTHYROIDISM: ICD-10-CM

## 2019-08-26 RX ORDER — LEVOTHYROXINE SODIUM 50 UG/1
50 TABLET ORAL DAILY
Qty: 31 TABLET | Refills: 98 | Status: SHIPPED | OUTPATIENT
Start: 2019-08-26 | End: 2020-05-28

## 2019-09-10 ENCOUNTER — ASSISTED LIVING VISIT (OUTPATIENT)
Dept: GERIATRICS | Facility: CLINIC | Age: 84
End: 2019-09-10
Payer: MEDICARE

## 2019-09-10 DIAGNOSIS — L29.9 PRURITIC DISORDER: ICD-10-CM

## 2019-09-10 DIAGNOSIS — F01.50 VASCULAR DEMENTIA WITHOUT BEHAVIORAL DISTURBANCE (H): ICD-10-CM

## 2019-09-10 DIAGNOSIS — Z86.73 H/O: CVA (CEREBROVASCULAR ACCIDENT): ICD-10-CM

## 2019-09-10 DIAGNOSIS — I10 BENIGN ESSENTIAL HYPERTENSION: Primary | ICD-10-CM

## 2019-09-10 DIAGNOSIS — E03.8 OTHER SPECIFIED HYPOTHYROIDISM: ICD-10-CM

## 2019-09-10 NOTE — PROGRESS NOTES
"April Donnelly is a 89 year old female seen September 10, 2019 at [facility] unit where she has resided for almost 3 years (admit 11/2016) seen to follow up HTN, dementia and right hip fracture a year ago.  Patient is seen in her room, up to high-backed WC.  Very quiet, looks out the window, occ makes eye contact.   The only things she says is \"I'm pretty much the same\"     Patient has had multiple falls, and suffered acetabular and hip fracture in 1/2017.   She had another fall in the DR here in August 2018, and suffered a proximal right femur fracture for which she underwent a right bipolar hemiarthroplasty.  She had PHYSICAL THERAPY, but has not ambulated since then.    Transfers with EZ stand and assist of two.   Had another fall in May and seen in ED without significant injury.  She also had atrial fib recognized during hospitalization, with unremarkable ECHO.   She is not anticoagulated secondary to h/o hemorrhagic stroke and multiple falls.    She has \"graduated\" from Hospice.       Past Medical History:   Diagnosis Date     Adjustment disorder with mixed anxiety and depressed mood 12/2/2016     Adjustment disorder with mixed anxiety and depressed mood 12/2/2016     Cerebrovascular accident (H) 12/2/2016     CVA (cerebral vascular accident) (H)      Hemorrhagic stroke (H) 12/2/2016     Hypertensive heart disease without heart failure 12/2/2016     Severe dementia 12/2/2016     Syncope 12/2/2016     Thyroid disease     Atrial fibrillation   Multiple falls  Acetabular hip fracture, 2017  Right proximal femur fracture, 2018      SH:   .      She has 3 sons Che, Woody and Reji.       ROS:  Scratching at skin on forearms, etc.   Excoriated areas   Wt Readings from Last 5 Encounters:   08/06/19 47.6 kg (105 lb)   07/23/19 44.5 kg (98 lb)   04/02/19 43.3 kg (95 lb 6.4 oz)   01/08/19 44 kg (97 lb)   11/27/18 47.4 kg (104 lb 8 oz)      EXAM:  Pleasant, NAD, frail  /78   Pulse 69   Temp " 97.5  F (36.4  C)   Resp 18   Wt 47.6 kg (105 lb)   BMI 19.20 kg/m     Neck supple without adenopathy  Lungs with decreased BS, no rales or wheeze  Heart RRR s1s2, no ectopy or murmur  Abd soft, NT, no distention, +BS  Ext without edema  Skin: excoriated areas on ext, especially forearms   Neuro: limited verbal skills, no tremor or stiffness, WC bound  Psych: affect okay    Last Comprehensive Metabolic Panel:  Sodium   Date Value Ref Range Status   07/25/2019 142 133 - 144 mmol/L Final     Potassium   Date Value Ref Range Status   07/25/2019 3.7 3.4 - 5.3 mmol/L Final     Chloride   Date Value Ref Range Status   07/25/2019 108 94 - 109 mmol/L Final     Carbon Dioxide   Date Value Ref Range Status   07/25/2019 27 20 - 32 mmol/L Final     Anion Gap   Date Value Ref Range Status   07/25/2019 7 3 - 14 mmol/L Final     Glucose   Date Value Ref Range Status   07/25/2019 70 70 - 99 mg/dL Final     Urea Nitrogen   Date Value Ref Range Status   07/25/2019 21 7 - 30 mg/dL Final     Creatinine   Date Value Ref Range Status   07/25/2019 0.59 0.52 - 1.04 mg/dL Final     GFR Estimate   Date Value Ref Range Status   07/25/2019 81 >60 ml/min/1.73_m2 Final     Calcium   Date Value Ref Range Status   07/25/2019 9.2 8.5 - 10.1 mg/dL Final     Lab Results   Component Value Date    AST 20 07/25/2019     Lab Results   Component Value Date    ALT 14 07/25/2019      BILITOTAL 0.6 07/25/2019     Lab Results   Component Value Date    ALBUMIN 3.5 07/25/2019     Lab Results   Component Value Date    PROTTOTAL 6.6 07/25/2019      Lab Results   Component Value Date    ALKPHOS 109 07/25/2019     Lab Results   Component Value Date    WBC 6.0 07/25/2019      HGB 13.7 07/25/2019      MCV 92 07/25/2019       07/25/2019     Head CT w/o contrast 5/11/19:  1. No intracranial hemorrhage or skull fractures.  2. Small low dense lesion in the right parietal region. This involves the cortex and subcortical white matter. There are small  associated  calcifications. This is probably due to a chronic infarct but comparison to any old scans or old CT reports would be helpful to  confirm that this has remained stable.  3. There is diffuse parenchymal volume loss.  White matter changes are present in the cerebral hemispheres that are consistent with small  vessel ischemic disease in this age patient.    IMP/PLAN:   (I48.0) Paroxysmal atrial fibrillation (H)    Comment:   Pulse Readings from Last 4 Encounters:   08/06/19 69   07/23/19 69   05/12/19 61   05/11/19 72      Plan: continue metoprolol for VR control.   Not anticoagulated secondary to h/o hemorrhagic stroke and multiple falls with injury.      (Z86.73) H/O: CVA (cerebrovascular accident)  (I69.359) Hemiparesis following cerebrovascular accident (CVA) (H)  Comment: limited mobility     Plan: assist with transfers and all mobility   No preventative tx given goals of care.       (I10) Benign essential hypertension  Comment:   BP Readings from Last 3 Encounters:   08/06/19 120/78   07/23/19 120/78   05/12/19 113/74     Plan: continue metoprolol 12.5 mg bid    (L29.9) Pruritic disorder  Comment: scratching behaviors, no clear etiology     Plan: Minimize meds, local creams, cotton gloves at night.       (F01.50) Vascular dementia without behavioral disturbance  Comment: ongoing decline with loss of language and mobility  Plan: [facility] unit for meds, meals, activity, safety      (E03.9) Acquired hypothyroidism  Comment:   TSH   Date Value Ref Range Status   08/23/2018 1.48 0.40 - 4.00 mU/L Final    Plan: yearly TSH     AD: DNR/DNI, comfort focus; Hospice re-evaluation if she has a downturn.         Teresa Muse MD

## 2019-09-10 NOTE — LETTER
"    9/10/2019        RE: April Donnelly  [facility]  [address] [facility] [address]        April Donnelly is a 89 year old female seen September 10, 2019 at [facility] unit where she has resided for almost 3 years (admit 11/2016) seen to follow up HTN, dementia and right hip fracture a year ago.  Patient is seen in her room, up to high-backed WC.  Very quiet, looks out the window, occ makes eye contact.   The only things she says is \"I'm pretty much the same\"     Patient has had multiple falls, and suffered acetabular and hip fracture in 1/2017.   She had another fall in the DR here in August 2018, and suffered a proximal right femur fracture for which she underwent a right bipolar hemiarthroplasty.  She had PHYSICAL THERAPY, but has not ambulated since then.    Transfers with EZ stand and assist of two.   Had another fall in May and seen in ED without significant injury.  She also had atrial fib recognized during hospitalization, with unremarkable ECHO.   She is not anticoagulated secondary to h/o hemorrhagic stroke and multiple falls.    She has \"graduated\" from Hospice.       Past Medical History:   Diagnosis Date     Adjustment disorder with mixed anxiety and depressed mood 12/2/2016     Adjustment disorder with mixed anxiety and depressed mood 12/2/2016     Cerebrovascular accident (H) 12/2/2016     CVA (cerebral vascular accident) (H)      Hemorrhagic stroke (H) 12/2/2016     Hypertensive heart disease without heart failure 12/2/2016     Severe dementia 12/2/2016     Syncope 12/2/2016     Thyroid disease     Atrial fibrillation   Multiple falls  Acetabular hip fracture, 2017  Right proximal femur fracture, 2018      SH:   .      She has 3 sons Woody Bolton and Reji.       ROS:  Scratching at skin on forearms, etc.   Excoriated areas   Wt Readings from Last 5 Encounters:   08/06/19 47.6 kg (105 lb)   07/23/19 44.5 kg (98 lb)   04/02/19 43.3 kg (95 lb 6.4 oz) "   01/08/19 44 kg (97 lb)   11/27/18 47.4 kg (104 lb 8 oz)      EXAM:  Pleasant, NAD, frail  /78   Pulse 69   Temp 97.5  F (36.4  C)   Resp 18   Wt 47.6 kg (105 lb)   BMI 19.20 kg/m      Neck supple without adenopathy  Lungs with decreased BS, no rales or wheeze  Heart RRR s1s2, no ectopy or murmur  Abd soft, NT, no distention, +BS  Ext without edema  Skin: excoriated areas on ext, especially forearms   Neuro: limited verbal skills, no tremor or stiffness, WC bound  Psych: affect okay    Last Comprehensive Metabolic Panel:  Sodium   Date Value Ref Range Status   07/25/2019 142 133 - 144 mmol/L Final     Potassium   Date Value Ref Range Status   07/25/2019 3.7 3.4 - 5.3 mmol/L Final     Chloride   Date Value Ref Range Status   07/25/2019 108 94 - 109 mmol/L Final     Carbon Dioxide   Date Value Ref Range Status   07/25/2019 27 20 - 32 mmol/L Final     Anion Gap   Date Value Ref Range Status   07/25/2019 7 3 - 14 mmol/L Final     Glucose   Date Value Ref Range Status   07/25/2019 70 70 - 99 mg/dL Final     Urea Nitrogen   Date Value Ref Range Status   07/25/2019 21 7 - 30 mg/dL Final     Creatinine   Date Value Ref Range Status   07/25/2019 0.59 0.52 - 1.04 mg/dL Final     GFR Estimate   Date Value Ref Range Status   07/25/2019 81 >60 ml/min/1.73_m2 Final     Calcium   Date Value Ref Range Status   07/25/2019 9.2 8.5 - 10.1 mg/dL Final     Lab Results   Component Value Date    AST 20 07/25/2019     Lab Results   Component Value Date    ALT 14 07/25/2019      BILITOTAL 0.6 07/25/2019     Lab Results   Component Value Date    ALBUMIN 3.5 07/25/2019     Lab Results   Component Value Date    PROTTOTAL 6.6 07/25/2019      Lab Results   Component Value Date    ALKPHOS 109 07/25/2019     Lab Results   Component Value Date    WBC 6.0 07/25/2019      HGB 13.7 07/25/2019      MCV 92 07/25/2019       07/25/2019     Head CT w/o contrast 5/11/19:  1. No intracranial hemorrhage or skull fractures.  2. Small low  dense lesion in the right parietal region. This involves the cortex and subcortical white matter. There are small associated  calcifications. This is probably due to a chronic infarct but comparison to any old scans or old CT reports would be helpful to  confirm that this has remained stable.  3. There is diffuse parenchymal volume loss.  White matter changes are present in the cerebral hemispheres that are consistent with small  vessel ischemic disease in this age patient.    IMP/PLAN:   (I48.0) Paroxysmal atrial fibrillation (H)    Comment:   Pulse Readings from Last 4 Encounters:   08/06/19 69   07/23/19 69   05/12/19 61   05/11/19 72      Plan: continue metoprolol for VR control.   Not anticoagulated secondary to h/o hemorrhagic stroke and multiple falls with injury.      (Z86.73) H/O: CVA (cerebrovascular accident)  (I69.359) Hemiparesis following cerebrovascular accident (CVA) (H)  Comment: limited mobility     Plan: assist with transfers and all mobility   No preventative tx given goals of care.       (I10) Benign essential hypertension  Comment:   BP Readings from Last 3 Encounters:   08/06/19 120/78   07/23/19 120/78   05/12/19 113/74     Plan: continue metoprolol 12.5 mg bid    (L29.9) Pruritic disorder  Comment: scratching behaviors, no clear etiology     Plan: Minimize meds, local creams, cotton gloves at night.       (F01.50) Vascular dementia without behavioral disturbance  Comment: ongoing decline with loss of language and mobility  Plan: [facility] Care unit for meds, meals, activity, safety      (E03.9) Acquired hypothyroidism  Comment:   TSH   Date Value Ref Range Status   08/23/2018 1.48 0.40 - 4.00 mU/L Final    Plan: yearly TSH     AD: DNR/DNI, comfort focus; Hospice re-evaluation if she has a downturn.         Teresa Muse MD         Sincerely,        Teresa Muse MD

## 2019-11-05 ENCOUNTER — ASSISTED LIVING VISIT (OUTPATIENT)
Dept: GERIATRICS | Facility: CLINIC | Age: 84
End: 2019-11-05
Payer: MEDICARE

## 2019-11-05 VITALS
DIASTOLIC BLOOD PRESSURE: 63 MMHG | OXYGEN SATURATION: 90 % | BODY MASS INDEX: 17.22 KG/M2 | HEART RATE: 54 BPM | RESPIRATION RATE: 16 BRPM | WEIGHT: 94.2 LBS | SYSTOLIC BLOOD PRESSURE: 136 MMHG | TEMPERATURE: 95.4 F

## 2019-11-05 DIAGNOSIS — L29.9 PRURITIC DISORDER: ICD-10-CM

## 2019-11-05 DIAGNOSIS — Z86.73 H/O: CVA (CEREBROVASCULAR ACCIDENT): ICD-10-CM

## 2019-11-05 DIAGNOSIS — I10 BENIGN ESSENTIAL HYPERTENSION: Primary | ICD-10-CM

## 2019-11-05 DIAGNOSIS — I48.20 CHRONIC A-FIB (H): ICD-10-CM

## 2019-11-05 DIAGNOSIS — L85.3 DRY SKIN: ICD-10-CM

## 2019-11-05 DIAGNOSIS — E03.9 ACQUIRED HYPOTHYROIDISM: ICD-10-CM

## 2019-11-05 RX ORDER — ACETAMINOPHEN 325 MG/1
650 TABLET ORAL 3 TIMES DAILY
COMMUNITY
End: 2020-05-03

## 2019-11-05 NOTE — PROGRESS NOTES
[facility]  Chief Complaint   Patient presents with     Annual Comprehensive Exam Assisted Living     [facility] Medical Record Number:  [id number]  Place of Service where encounter took place:  [facility] [[id number]]    HPI:    April Donnelly  is a 89 year old  (1/30/1930), who is being seen today for an annual comprehensive visit. HPI information obtained from: facility chart records, facility staff, [facility] Epic chart review and family/first contact Handt and Vani report.  Today's concerns are:    Seen today for follow up to chronic conditions. No concerns from staff or family. Sitting in high-back WC, continues to feed herself, less expressive, progressively flat affect. History of pruritus improved with adaptive clothing and gloves/mitts overnight. No longer ambulatory which has lessened fall risk. BP average 126/74 HR 76. Weight lower but stable at 94 lbs. No edema. Previously on hospice but graduated.    ALLERGIES: Hydrocodone; Lisinopril; Metoprolol; and Terazosin  PAST MEDICAL HISTORY:  has a past medical history of Adjustment disorder with mixed anxiety and depressed mood (12/2/2016), Adjustment disorder with mixed anxiety and depressed mood (12/2/2016), Cerebrovascular accident (H) (12/2/2016), CVA (cerebral vascular accident) (H), Hemorrhagic stroke (H) (12/2/2016), Hypertensive heart disease without heart failure (12/2/2016), Severe dementia (12/2/2016), Syncope (12/2/2016), and Thyroid disease.  PAST SURGICAL HISTORY:  has a past surgical history that includes Thyroidectomy and Open reduction internal fixation hip bipolar (Right, 8/24/2018).  IMMUNIZATIONS:  Immunization History   Administered Date(s) Administered     Influenza (High Dose) 3 valent vaccine 09/26/2017, 10/03/2018, 10/02/2019     Influenza (IIV3) PF 10/12/2016     Pneumo Conj 13-V (2010&after) 12/06/2016     Pneumococcal 23 valent 11/06/2012     TDAP Vaccine (Boostrix) 10/27/2016     Above  immunizations pulled from [facility]. [facility] and facility records also reconciled. Outstanding information sent to  to update [facility].  Future immunizations are not needed at this point as all recommended immunizations are up to date.     Current Outpatient Medications   Medication Sig Dispense Refill     acetaminophen (TYLENOL) 325 MG tablet Take 650 mg by mouth every 6 hours as needed for mild pain       CALMOSEPTINE 0.44-20.6 % OINT ointment APPLY TOPICALLY TO COCCYX TWICE DAILY AND WITH ALL INCONTINENT CHANGES FOR SKIN ITCH AND BREAKDOWN 113 g 97     camphor-menthol (SARNA) 0.5-0.5 % external lotion APPLY LOTION TO BODY DAILY 222 mL 97     docusate sodium (COLACE) 50 MG capsule Take 50 mg by mouth as needed for constipation       levothyroxine (SYNTHROID/LEVOTHROID) 50 MCG tablet Take 1 tablet (50 mcg) by mouth daily 31 tablet 98     metoprolol tartrate (LOPRESSOR) 25 MG tablet TAKE ONE-HALF TABLET (12.5MG) BY MOUTH TWICE DAILY * OK TO CRUSH * 31 tablet 98     senna (SENOKOT) 8.6 MG tablet Take 1 tablet by mouth daily as needed          Case Management:  I have reviewed the Assisted Living care plan, current immunizations and preventive care/cancer screening. .Future cancer screening is not clinically indicated secondary to age/goals of care Patient's desire to return to the community is not assessible due to cognitive impairment. Current Level of Care is appropriate.    Advance Directive Discussion:    I reviewed the current advanced directives as reflected in EPIC, the POLST and the facility chart, and verified the congruency of orders 11/5/19. I contacted the first party Handt and discussed the plan of Care.  I did not due to cognitive impairment review the advance directives with the resident.     Team Discussion:  I communicated with the appropriate disciplines involved with the Plan of Care:   Nursing    Patient's goal is pain control and comfort.  Information reviewed:  Medications,  vital signs, orders, and nursing notes.    ROS:  Unobtainable secondary to cognitive impairment.  and Unobtainable secondary to aphasia.     Vitals:  /63   Pulse 54   Temp 95.4  F (35.2  C)   Resp 16   Wt 42.7 kg (94 lb 3.2 oz)   SpO2 90%   BMI 17.22 kg/m   Body mass index is 17.22 kg/m .  Exam:  GENERAL APPEARANCE: calm and quiet   ENT:  Mouth and posterior oropharynx normal, dry mucous membranes, [place]  EYES:  EOM, conjunctivae normal,  pupils and irises normal, glasses on  RESP:  respiratory effort and palpation of chest normal, lungs grossly clear but diminished   CV:  Palpation and auscultation of heart done , trace edema, grade 2/6 murmur, irregular rhythm, regular rate  ABDOMEN: soft, nontender, bowel sounds present  M/S: passive ROM to UE and LE with no pain  SKIN: unable to examine much of skin today  NEURO: CLAUDIA  PSYCH:  insight and judgement impaired, memory impaired    Lab/Diagnostic data:   CBC RESULTS:   Recent Labs   Lab Test 07/25/19 09/10/18  1425   WBC 6.0 6.6   RBC 4.41 3.94   HGB 13.7 12.1   HCT 40.7 38.7   MCV 92 98   MCH 31.1 30.7   MCHC 33.7 31.3*   RDW 12.9 13.9    359       Last Basic Metabolic Panel:  Recent Labs   Lab Test 07/25/19 09/10/18  1425    139   POTASSIUM 3.7 4.3   CHLORIDE 108 106   BOLA 9.2 9.0   CO2 27 26   BUN 21 21   CR 0.59 0.66   GLC 70 94       Liver Function Studies -   Recent Labs   Lab Test 07/25/19 04/26/17   PROTTOTAL 6.6* 6.2*   ALBUMIN 3.5 3.1*   BILITOTAL 0.6 0.4   ALKPHOS 109 123   AST 20 22   ALT 14 18       TSH   Date Value Ref Range Status   08/23/2018 1.48 0.40 - 4.00 mU/L Final   04/26/2017 1.89 0.40 - 4.00 mU/L Final     TSH: 2.60 on 7/25/19    No results found for: A1C    ASSESSMENT/PLAN  (I10) Benign essential hypertension  (primary encounter diagnosis)  (I48.20) Chronic a-fib  Comment: stable  Plan:   -not anticoagulated secondary to falls  -metoprolol 12.5 mg po BID  -BMP periodically     (Z86.73) H/O: CVA (cerebrovascular  accident)  Comment: limited mobility   Plan:   -off statin and anticoagulation     (L29.9) Pruritic disorder  (L85.3) Dry skin  Comment: longstanding history   Plan:   -Sarna and Calmoseptine daily  -adaptive clothes and mitts at HS    (E03.9) Acquired hypothyroidism  Comment: stable  Plan:   -TSH from labs not uploaded?  -levothyroxine 50 mcg po daily          Electronically signed by:  HIGINIO Pena CNP

## 2019-11-05 NOTE — LETTER
11/5/2019        RE: April Donnelly  [facility]  [address] [facility] [address]        [facility]  Chief Complaint   Patient presents with     Annual Comprehensive Exam Assisted Living     [facility] Medical Record Number:  [id number]  Place of Service where encounter took place:  [facility] [[id number]]    HPI:    April Donnelly  is a 89 year old  (1/30/1930), who is being seen today for an annual comprehensive visit. HPI information obtained from: facility chart records, facility staff, [facility] Epic chart review and family/first contact Leigh report.  Today's concerns are:    Seen today for follow up to chronic conditions. No concerns from staff or family. Sitting in high-back WC, continues to feed herself, less expressive, progressively flat affect. History of pruritus improved with adaptive clothing and gloves/mitts overnight. No longer ambulatory which has lessened fall risk. BP average 126/74 HR 76. Weight lower but stable at 94 lbs. No edema. Previously on hospice but graduated.    ALLERGIES: Hydrocodone; Lisinopril; Metoprolol; and Terazosin  PAST MEDICAL HISTORY:  has a past medical history of Adjustment disorder with mixed anxiety and depressed mood (12/2/2016), Adjustment disorder with mixed anxiety and depressed mood (12/2/2016), Cerebrovascular accident (H) (12/2/2016), CVA (cerebral vascular accident) (H), Hemorrhagic stroke (H) (12/2/2016), Hypertensive heart disease without heart failure (12/2/2016), Severe dementia (12/2/2016), Syncope (12/2/2016), and Thyroid disease.  PAST SURGICAL HISTORY:  has a past surgical history that includes Thyroidectomy and Open reduction internal fixation hip bipolar (Right, 8/24/2018).  IMMUNIZATIONS:  Immunization History   Administered Date(s) Administered     Influenza (High Dose) 3 valent vaccine 09/26/2017, 10/03/2018, 10/02/2019     Influenza (IIV3) PF 10/12/2016     Pneumo Conj 13-V  (2010&after) 12/06/2016     Pneumococcal 23 valent 11/06/2012     TDAP Vaccine (Boostrix) 10/27/2016     Above immunizations pulled from [facility]. [facility] and facility records also reconciled. Outstanding information sent to  to update [facility].  Future immunizations are not needed at this point as all recommended immunizations are up to date.     Current Outpatient Medications   Medication Sig Dispense Refill     acetaminophen (TYLENOL) 325 MG tablet Take 650 mg by mouth every 6 hours as needed for mild pain       CALMOSEPTINE 0.44-20.6 % OINT ointment APPLY TOPICALLY TO COCCYX TWICE DAILY AND WITH ALL INCONTINENT CHANGES FOR SKIN ITCH AND BREAKDOWN 113 g 97     camphor-menthol (SARNA) 0.5-0.5 % external lotion APPLY LOTION TO BODY DAILY 222 mL 97     docusate sodium (COLACE) 50 MG capsule Take 50 mg by mouth as needed for constipation       levothyroxine (SYNTHROID/LEVOTHROID) 50 MCG tablet Take 1 tablet (50 mcg) by mouth daily 31 tablet 98     metoprolol tartrate (LOPRESSOR) 25 MG tablet TAKE ONE-HALF TABLET (12.5MG) BY MOUTH TWICE DAILY * OK TO CRUSH * 31 tablet 98     senna (SENOKOT) 8.6 MG tablet Take 1 tablet by mouth daily as needed          Case Management:  I have reviewed the Assisted Living care plan, current immunizations and preventive care/cancer screening. .Future cancer screening is not clinically indicated secondary to age/goals of care Patient's desire to return to the community is not assessible due to cognitive impairment. Current Level of Care is appropriate.    Advance Directive Discussion:    I reviewed the current advanced directives as reflected in EPIC, the POLST and the facility chart, and verified the congruency of orders 11/5/19. I contacted the first party Handt and discussed the plan of Care.  I did not due to cognitive impairment review the advance directives with the resident.     Team Discussion:  I communicated with the appropriate disciplines involved with  the Plan of Care:   Nursing    Patient's goal is pain control and comfort.  Information reviewed:  Medications, vital signs, orders, and nursing notes.    ROS:  Unobtainable secondary to cognitive impairment.  and Unobtainable secondary to aphasia.     Vitals:  /63   Pulse 54   Temp 95.4  F (35.2  C)   Resp 16   Wt 42.7 kg (94 lb 3.2 oz)   SpO2 90%   BMI 17.22 kg/m    Body mass index is 17.22 kg/m .  Exam:  GENERAL APPEARANCE: calm and quiet   ENT:  Mouth and posterior oropharynx normal, dry mucous membranes, [place]  EYES:  EOM, conjunctivae normal,  pupils and irises normal, glasses on  RESP:  respiratory effort and palpation of chest normal, lungs grossly clear but diminished   CV:  Palpation and auscultation of heart done , trace edema, grade 2/6 murmur, irregular rhythm, regular rate  ABDOMEN: soft, nontender, bowel sounds present  M/S: passive ROM to UE and LE with no pain  SKIN: unable to examine much of skin today  NEURO: CLAUDIA  PSYCH:  insight and judgement impaired, memory impaired    Lab/Diagnostic data:   CBC RESULTS:   Recent Labs   Lab Test 07/25/19 09/10/18  1425   WBC 6.0 6.6   RBC 4.41 3.94   HGB 13.7 12.1   HCT 40.7 38.7   MCV 92 98   MCH 31.1 30.7   MCHC 33.7 31.3*   RDW 12.9 13.9    359       Last Basic Metabolic Panel:  Recent Labs   Lab Test 07/25/19 09/10/18  1425    139   POTASSIUM 3.7 4.3   CHLORIDE 108 106   BOLA 9.2 9.0   CO2 27 26   BUN 21 21   CR 0.59 0.66   GLC 70 94       Liver Function Studies -   Recent Labs   Lab Test 07/25/19 04/26/17   PROTTOTAL 6.6* 6.2*   ALBUMIN 3.5 3.1*   BILITOTAL 0.6 0.4   ALKPHOS 109 123   AST 20 22   ALT 14 18       TSH   Date Value Ref Range Status   08/23/2018 1.48 0.40 - 4.00 mU/L Final   04/26/2017 1.89 0.40 - 4.00 mU/L Final     TSH: 2.60 on 7/25/19    No results found for: A1C    ASSESSMENT/PLAN  (I10) Benign essential hypertension  (primary encounter diagnosis)  (I48.20) Chronic a-fib  Comment: stable  Plan:   -not anticoagulated  secondary to falls  -metoprolol 12.5 mg po BID  -BMP periodically     (Z86.73) H/O: CVA (cerebrovascular accident)  Comment: limited mobility   Plan:   -off statin and anticoagulation     (L29.9) Pruritic disorder  (L85.3) Dry skin  Comment: longstanding history   Plan:   -Sarna and Calmoseptine daily  -adaptive clothes and mitts at HS    (E03.9) Acquired hypothyroidism  Comment: stable  Plan:   -TSH from labs not uploaded?  -levothyroxine 50 mcg po daily          Electronically signed by:  HIGINIO Pena CNP           Sincerely,        HIGINIO Pena CNP

## 2019-12-21 DIAGNOSIS — I10 BENIGN ESSENTIAL HYPERTENSION: ICD-10-CM

## 2019-12-22 RX ORDER — METOPROLOL TARTRATE 25 MG/1
TABLET, FILM COATED ORAL
Qty: 31 TABLET | Status: SHIPPED | OUTPATIENT
Start: 2019-12-22 | End: 2020-05-28

## 2020-01-01 ENCOUNTER — ASSISTED LIVING VISIT (OUTPATIENT)
Dept: GERIATRICS | Facility: CLINIC | Age: 85
End: 2020-01-01
Payer: MEDICARE

## 2020-01-01 VITALS
HEIGHT: 62 IN | DIASTOLIC BLOOD PRESSURE: 69 MMHG | OXYGEN SATURATION: 96 % | SYSTOLIC BLOOD PRESSURE: 127 MMHG | RESPIRATION RATE: 16 BRPM | HEART RATE: 70 BPM | WEIGHT: 97 LBS | BODY MASS INDEX: 17.85 KG/M2 | TEMPERATURE: 97.7 F

## 2020-01-01 DIAGNOSIS — K59.01 SLOW TRANSIT CONSTIPATION: ICD-10-CM

## 2020-01-01 DIAGNOSIS — K59.01 SLOW TRANSIT CONSTIPATION: Primary | ICD-10-CM

## 2020-01-01 DIAGNOSIS — R05.9 COUGH: Primary | ICD-10-CM

## 2020-01-01 DIAGNOSIS — R53.81 MALAISE: ICD-10-CM

## 2020-01-01 RX ORDER — SENNOSIDES 8.6 MG
TABLET ORAL
Qty: 12 TABLET | Status: SHIPPED | OUTPATIENT
Start: 2020-01-01

## 2020-01-01 RX ORDER — DOCUSATE SODIUM 50MG AND SENNOSIDES 8.6MG 8.6; 5 MG/1; MG/1
TABLET, FILM COATED ORAL
Qty: 100 TABLET | Refills: 97 | Status: SHIPPED | OUTPATIENT
Start: 2020-01-01

## 2020-01-01 RX ORDER — DEXTROMETHORPHAN POLISTIREX 30 MG/5ML
30 SUSPENSION ORAL 2 TIMES DAILY
COMMUNITY
Start: 2020-01-01 | End: 2020-01-01

## 2020-01-01 ASSESSMENT — MIFFLIN-ST. JEOR: SCORE: 813.24

## 2020-01-28 ENCOUNTER — ASSISTED LIVING VISIT (OUTPATIENT)
Dept: GERIATRICS | Facility: CLINIC | Age: 85
End: 2020-01-28
Payer: MEDICARE

## 2020-01-28 VITALS
WEIGHT: 98 LBS | OXYGEN SATURATION: 95 % | RESPIRATION RATE: 16 BRPM | HEART RATE: 70 BPM | SYSTOLIC BLOOD PRESSURE: 107 MMHG | BODY MASS INDEX: 17.92 KG/M2 | DIASTOLIC BLOOD PRESSURE: 63 MMHG | TEMPERATURE: 96.1 F

## 2020-01-28 DIAGNOSIS — I10 BENIGN ESSENTIAL HYPERTENSION: ICD-10-CM

## 2020-01-28 DIAGNOSIS — I69.359 HEMIPARESIS FOLLOWING CEREBROVASCULAR ACCIDENT (CVA) (H): ICD-10-CM

## 2020-01-28 DIAGNOSIS — Z86.73 H/O: CVA (CEREBROVASCULAR ACCIDENT): ICD-10-CM

## 2020-01-28 DIAGNOSIS — I48.11 LONGSTANDING PERSISTENT ATRIAL FIBRILLATION (H): ICD-10-CM

## 2020-01-28 DIAGNOSIS — E46 PROTEIN-CALORIE MALNUTRITION, UNSPECIFIED SEVERITY (H): Primary | ICD-10-CM

## 2020-01-28 PROBLEM — I48.20 CHRONIC A-FIB (H): Status: ACTIVE | Noted: 2020-01-28

## 2020-01-28 RX ORDER — LOPERAMIDE HYDROCHLORIDE 2 MG/1
2-4 TABLET ORAL 2 TIMES DAILY PRN
COMMUNITY

## 2020-01-28 NOTE — PROGRESS NOTES
[facility]  [facility] Medical Record Number:  [id number]  Place of Service where encounter took place:  [facility] [[id number]]  Chief Complaint   Patient presents with     RECHECK       HPI:    April Donnelly  is a 89 year old (1/30/1930), who is being seen today for an episodic care visit.  HPI information obtained from: facility chart records, facility staff and [facility] chart review. Today's concern is:    Seen today for follow up to chronic conditions. No concerns from staff or family. Did have episode of pedal edema now mostly resolved Sitting in high-back WC, continues to feed herself at times, less expressive, progressively flat affect. History of pruritus improved with adaptive clothing and gloves/mitts overnight. No longer ambulatory which has lessened fall risk. History of falls and suffered acetabular and hip fracture in 1/2017. She had another fall in the DR here in August 2018, and suffered a proximal right femur fracture for which she underwent a right bipolar hemiarthroplasty. She had PHYSICAL THERAPY, but has not ambulated since then. Transfers with EZ stand and assist of two. Had another fall in May and seen in ED without significant injury. BP average 120/65 HR 67. Weight stable between  lbs. No edema. Previously on hospice but graduated. Sleeping in broda chair for visit. No discomfort with PROM to UE and LE     Past Medical and Surgical History reviewed in Epic today.    MEDICATIONS:  Current Outpatient Medications   Medication Sig Dispense Refill     acetaminophen (TYLENOL) 325 MG tablet Take 650 mg by mouth every 6 hours as needed for mild pain       CALMOSEPTINE 0.44-20.6 % OINT ointment APPLY TOPICALLY TO COCCYX TWICE DAILY AND WITH ALL INCONTINENT CHANGES FOR SKIN ITCH AND BREAKDOWN 113 g 97     camphor-menthol (SARNA) 0.5-0.5 % external lotion APPLY LOTION TO BODY DAILY 222 mL 97     docusate sodium (COLACE) 50 MG capsule Take 50 mg by  mouth as needed for constipation       levothyroxine (SYNTHROID/LEVOTHROID) 50 MCG tablet Take 1 tablet (50 mcg) by mouth daily 31 tablet 98     metoprolol tartrate (LOPRESSOR) 25 MG tablet TAKE ONE-HALF TABLET (12.5MG) BY MOUTH TWICE DAILY *OK TO CRUSH* 31 tablet PRN     senna (SENOKOT) 8.6 MG tablet Take 1 tablet by mouth daily as needed        REVIEW OF SYSTEMS:  Unobtainable secondary to cognitive impairment.  and Unobtainable secondary to aphasia.     Objective:  /63   Pulse 70   Temp 96.1  F (35.6  C)   Resp 16   Wt 44.5 kg (98 lb)   SpO2 95%   BMI 17.92 kg/m    Exam:  GENERAL APPEARANCE: calm and quiet,sleepy  ENT:  Mouth and posterior oropharynx normal, dry mucous membranes, [place]  EYES:  EOM, conjunctivae normal,  pupils and irises normal, glasses on  RESP:  respiratory effort and palpation of chest normal, lungs grossly clear but diminished   CV:  Palpation and auscultation of heart done , trace edema, grade 2/6 murmur, irregular rhythm, regular rate  ABDOMEN: soft, nontender, bowel sounds present  M/S: passive ROM to UE and LE with no s/s of pain  SKIN: unable to examine much of skin today-staff reports at baseline  NEURO: CLAUDIA  PSYCH:  insight and judgement impaired, memory impaired    Labs:   CBC RESULTS:   Recent Labs   Lab Test 07/25/19 09/10/18  1425   WBC 6.0 6.6   RBC 4.41 3.94   HGB 13.7 12.1   HCT 40.7 38.7   MCV 92 98   MCH 31.1 30.7   MCHC 33.7 31.3*   RDW 12.9 13.9    359       Last Basic Metabolic Panel:  Recent Labs   Lab Test 07/25/19 09/10/18  1425    139   POTASSIUM 3.7 4.3   CHLORIDE 108 106   BOLA 9.2 9.0   CO2 27 26   BUN 21 21   CR 0.59 0.66   GLC 70 94       Liver Function Studies -   Recent Labs   Lab Test 07/25/19 04/26/17   PROTTOTAL 6.6* 6.2*   ALBUMIN 3.5 3.1*   BILITOTAL 0.6 0.4   ALKPHOS 109 123   AST 20 22   ALT 14 18       TSH   Date Value Ref Range Status   08/23/2018 1.48 0.40 - 4.00 mU/L Final   04/26/2017 1.89 0.40 - 4.00 mU/L Final     TSH 7/25/19:  2.60    No results found for: A1C    ASSESSMENT/PLAN:  (E46) Protein-calorie malnutrition, unspecified severity (H)  (primary encounter diagnosis)  Comment: stable  Plan:   -staff assist for feeding as needed  -monthly weights    (I48.11) Longstanding persistent atrial fibrillation  (I10) Benign essential hypertension  (Z86.73) H/O: CVA (cerebrovascular accident)  (I69.359) Hemiparesis following cerebrovascular accident (CVA) (H)  Comment: stable  Plan:   -not anticoagulated secondary to falls  -metoprolol 12.5 mg po BID  -BMP periodically   -off statin        Electronically signed by:  HIGINIO Pena CNP

## 2020-01-28 NOTE — LETTER
1/28/2020        RE: April Donnelly  [facility]  [address] [facility] [address]        [facility]  [place] Medical Record Number:  [id number]  Place of Service where encounter took place:  [facility] [[id number]]  Chief Complaint   Patient presents with     RECHECK       HPI:    April Donnelly  is a 89 year old (1/30/1930), who is being seen today for an episodic care visit.  HPI information obtained from: facility chart records, facility staff and [facility] chart review. Today's concern is:    Seen today for follow up to chronic conditions. No concerns from staff or family. Did have episode of pedal edema now mostly resolved Sitting in high-back WC, continues to feed herself at times, less expressive, progressively flat affect. History of pruritus improved with adaptive clothing and gloves/mitts overnight. No longer ambulatory which has lessened fall risk. History of falls and suffered acetabular and hip fracture in 1/2017. She had another fall in the DR here in August 2018, and suffered a proximal right femur fracture for which she underwent a right bipolar hemiarthroplasty. She had PHYSICAL THERAPY, but has not ambulated since then. Transfers with EZ stand and assist of two. Had another fall in May and seen in ED without significant injury. BP average 120/65 HR 67. Weight stable between  lbs. No edema. Previously on hospice but graduated. Sleeping in broda chair for visit. No discomfort with PROM to UE and LE     Past Medical and Surgical History reviewed in Epic today.    MEDICATIONS:  Current Outpatient Medications   Medication Sig Dispense Refill     acetaminophen (TYLENOL) 325 MG tablet Take 650 mg by mouth every 6 hours as needed for mild pain       CALMOSEPTINE 0.44-20.6 % OINT ointment APPLY TOPICALLY TO COCCYX TWICE DAILY AND WITH ALL INCONTINENT CHANGES FOR SKIN ITCH AND BREAKDOWN 113 g 97     camphor-menthol (SARNA)  0.5-0.5 % external lotion APPLY LOTION TO BODY DAILY 222 mL 97     docusate sodium (COLACE) 50 MG capsule Take 50 mg by mouth as needed for constipation       levothyroxine (SYNTHROID/LEVOTHROID) 50 MCG tablet Take 1 tablet (50 mcg) by mouth daily 31 tablet 98     metoprolol tartrate (LOPRESSOR) 25 MG tablet TAKE ONE-HALF TABLET (12.5MG) BY MOUTH TWICE DAILY *OK TO CRUSH* 31 tablet PRN     senna (SENOKOT) 8.6 MG tablet Take 1 tablet by mouth daily as needed        REVIEW OF SYSTEMS:  Unobtainable secondary to cognitive impairment.  and Unobtainable secondary to aphasia.     Objective:  /63   Pulse 70   Temp 96.1  F (35.6  C)   Resp 16   Wt 44.5 kg (98 lb)   SpO2 95%   BMI 17.92 kg/m     Exam:  GENERAL APPEARANCE: calm and quiet,sleepy  ENT:  Mouth and posterior oropharynx normal, dry mucous membranes, [place]  EYES:  EOM, conjunctivae normal,  pupils and irises normal, glasses on  RESP:  respiratory effort and palpation of chest normal, lungs grossly clear but diminished   CV:  Palpation and auscultation of heart done , trace edema, grade 2/6 murmur, irregular rhythm, regular rate  ABDOMEN: soft, nontender, bowel sounds present  M/S: passive ROM to UE and LE with no s/s of pain  SKIN: unable to examine much of skin today-staff reports at baseline  NEURO: CLAUDIA  PSYCH:  insight and judgement impaired, memory impaired    Labs:   CBC RESULTS:   Recent Labs   Lab Test 07/25/19 09/10/18  1425   WBC 6.0 6.6   RBC 4.41 3.94   HGB 13.7 12.1   HCT 40.7 38.7   MCV 92 98   MCH 31.1 30.7   MCHC 33.7 31.3*   RDW 12.9 13.9    359       Last Basic Metabolic Panel:  Recent Labs   Lab Test 07/25/19 09/10/18  1425    139   POTASSIUM 3.7 4.3   CHLORIDE 108 106   BOLA 9.2 9.0   CO2 27 26   BUN 21 21   CR 0.59 0.66   GLC 70 94       Liver Function Studies -   Recent Labs   Lab Test 07/25/19 04/26/17   PROTTOTAL 6.6* 6.2*   ALBUMIN 3.5 3.1*   BILITOTAL 0.6 0.4   ALKPHOS 109 123   AST 20 22   ALT 14 18       TSH   Date  Value Ref Range Status   08/23/2018 1.48 0.40 - 4.00 mU/L Final   04/26/2017 1.89 0.40 - 4.00 mU/L Final     TSH 7/25/19: 2.60    No results found for: A1C    ASSESSMENT/PLAN:  (E46) Protein-calorie malnutrition, unspecified severity (H)  (primary encounter diagnosis)  Comment: stable  Plan:   -staff assist for feeding as needed  -monthly weights    (I48.11) Longstanding persistent atrial fibrillation  (I10) Benign essential hypertension  (Z86.73) H/O: CVA (cerebrovascular accident)  (I69.359) Hemiparesis following cerebrovascular accident (CVA) (H)  Comment: stable  Plan:   -not anticoagulated secondary to falls  -metoprolol 12.5 mg po BID  -BMP periodically   -off statin        Electronically signed by:  HIGINIO Pena CNP            Sincerely,        HIGINIO Pena CNP

## 2020-03-21 DIAGNOSIS — K59.01 SLOW TRANSIT CONSTIPATION: Primary | ICD-10-CM

## 2020-03-23 RX ORDER — SENNOSIDES 8.6 MG
TABLET ORAL
Qty: 30 TABLET | Refills: 97 | Status: SHIPPED | OUTPATIENT
Start: 2020-03-23 | End: 2020-05-28

## 2020-04-21 ENCOUNTER — VIRTUAL VISIT (OUTPATIENT)
Dept: GERIATRICS | Facility: CLINIC | Age: 85
End: 2020-04-21
Payer: MEDICARE

## 2020-04-21 VITALS
SYSTOLIC BLOOD PRESSURE: 114 MMHG | HEIGHT: 62 IN | BODY MASS INDEX: 18.18 KG/M2 | RESPIRATION RATE: 17 BRPM | TEMPERATURE: 96.5 F | DIASTOLIC BLOOD PRESSURE: 89 MMHG | WEIGHT: 98.8 LBS | HEART RATE: 74 BPM

## 2020-04-21 DIAGNOSIS — I10 BENIGN ESSENTIAL HYPERTENSION: ICD-10-CM

## 2020-04-21 DIAGNOSIS — I48.11 LONGSTANDING PERSISTENT ATRIAL FIBRILLATION (H): Primary | ICD-10-CM

## 2020-04-21 DIAGNOSIS — Z86.73 H/O: CVA (CEREBROVASCULAR ACCIDENT): ICD-10-CM

## 2020-04-21 DIAGNOSIS — F01.50 VASCULAR DEMENTIA WITHOUT BEHAVIORAL DISTURBANCE (H): ICD-10-CM

## 2020-04-21 DIAGNOSIS — I69.359 HEMIPARESIS FOLLOWING CEREBROVASCULAR ACCIDENT (CVA) (H): ICD-10-CM

## 2020-04-21 DIAGNOSIS — R21 RASH AND NONSPECIFIC SKIN ERUPTION: ICD-10-CM

## 2020-04-21 PROCEDURE — 99207 ZZC CDG-MDM COMPONENT: MEETS LOW - DOWN CODED: CPT | Performed by: NURSE PRACTITIONER

## 2020-04-21 ASSESSMENT — MIFFLIN-ST. JEOR: SCORE: 821.56

## 2020-04-21 NOTE — LETTER
"    4/21/2020        RE: April Donnelly  [facility]  [address] [facility] [address]        [facility]   April Donnelly is being evaluated via a billable video visit due to the restrictions of the Covid-19 pandemic.   The patient has been notified of following:  This video visit will be conducted via a call between you and your provider. We have found that certain health care needs can be provided without the need for an in-person physical exam.  This service lets us provide the care you need with a video conversation. If during the course of the call the provider feels a video visit is not appropriate, you will not be charged for this service.\"   The provider has received verbal consent for a Video Visit from the patient or first contact? Yes  Patient  or facility staff would like the video invitation sent by: N/A   Video Start Time: 3:27 PM    [place] Medical Record Number:  [id number]  Place of Location at the time of visit: [facility]  Chief Complaint   Patient presents with     RECHECK     HPI:  April Donnelly  is a 89 year old (1/30/1930), who is being seen today for a visit.  HPI information obtained from: facility chart records, facility staff and [facility] chart review. Today's concern is:    Follow up to chronic condition and newer onset of teeth grinding. Does have scheduled tylenol somewhat effective.  Pedal edema improved with use of compression socks. Sitting in high-back WC, continues to feed herself at times, less expressive, progressively flat affect. History of pruritus improved with adaptive clothing and gloves/mitts overnight. No longer ambulatory which has lessened fall risk. History of falls and suffered acetabular and hip fracture in 1/2017. She had another fall in the  here in August 2018, and suffered a proximal right femur fracture for which she underwent a right bipolar hemiarthroplasty. She had PHYSICAL " "THERAPY, but has not ambulated since then. Weight stable 97-99 lbs. BP average 120/73 HR 66-84.     Past Medical and Surgical History reviewed in Epic today.  MEDICATIONS:    Current Outpatient Medications   Medication Sig Dispense Refill     acetaminophen (TYLENOL) 325 MG tablet Take 650 mg by mouth 3 times daily  mg daily PRN       CALMOSEPTINE 0.44-20.6 % OINT ointment APPLY TOPICALLY TO COCCYX TWICE DAILY AND WITH ALL INCONTINENT CHANGES FOR SKIN ITCH AND BREAKDOWN 113 g 97     camphor-menthol (SARNA) 0.5-0.5 % external lotion APPLY LOTION TO BODY DAILY 222 mL 97     Emollient (VANICREAM EX) Apply topically 2 times daily to affected areas and PRN       levothyroxine (SYNTHROID/LEVOTHROID) 50 MCG tablet Take 1 tablet (50 mcg) by mouth daily 31 tablet 98     loperamide (IMODIUM A-D) 2 MG tablet Take 2 mg by mouth 2 times daily as needed for diarrhea       metoprolol tartrate (LOPRESSOR) 25 MG tablet TAKE ONE-HALF TABLET (12.5MG) BY MOUTH TWICE DAILY *OK TO CRUSH* 31 tablet PRN     sennosides (SENOKOT) 8.6 MG tablet TAKE 1 TABLET BY MOUTH ONCE DAILY AS NEEDED 30 tablet 97     docusate sodium (COLACE) 50 MG capsule Take 50 mg by mouth as needed for constipation       REVIEW OF SYSTEMS: Unobtainable secondary to cognitive impairment.  and Unobtainable secondary to aphasia.   Objective: /89   Pulse 74   Temp 96.5  F (35.8  C)   Resp 17   Ht 1.575 m (5' 2.01\")   Wt 44.8 kg (98 lb 12.8 oz)   BMI 18.06 kg/m    Limited visit exam done given COVID-19 precautions.   GENERAL APPEARANCE: calm and quiet,alert  ENT:  Mouth and posterior oropharynx normal, dry mucous membranes, [place]  EYES:  EOM, conjunctivae normal,  pupils and irises normal, glasses on  RESP:  no s/s of respiratory distress   CV: at baseline per staff  ABDOMEN: at baseline per staff  M/S: passive ROM to UE and LE with no s/s of pain  SKIN: unable to examine much of skin today-staff reports at baseline  NEURO: CLAUDIA  PSYCH:  insight and judgement " impaired, memory impaired    Labs:   CBC RESULTS:   Recent Labs   Lab Test 07/25/19 09/10/18  1425   WBC 6.0 6.6   RBC 4.41 3.94   HGB 13.7 12.1   HCT 40.7 38.7   MCV 92 98   MCH 31.1 30.7   MCHC 33.7 31.3*   RDW 12.9 13.9    359       Last Basic Metabolic Panel:  Recent Labs   Lab Test 07/25/19 09/10/18  1425    139   POTASSIUM 3.7 4.3   CHLORIDE 108 106   BOLA 9.2 9.0   CO2 27 26   BUN 21 21   CR 0.59 0.66   GLC 70 94       Liver Function Studies -   Recent Labs   Lab Test 07/25/19 04/26/17   PROTTOTAL 6.6* 6.2*   ALBUMIN 3.5 3.1*   BILITOTAL 0.6 0.4   ALKPHOS 109 123   AST 20 22   ALT 14 18       TSH   Date Value Ref Range Status   08/23/2018 1.48 0.40 - 4.00 mU/L Final   04/26/2017 1.89 0.40 - 4.00 mU/L Final       No results found for: A1C    ASSESSMENT/PLAN:    (I48.11) Longstanding persistent atrial fibrillation  (I10) Benign essential hypertension  (Z86.73) H/O: CVA (cerebrovascular accident)  (I69.359) Hemiparesis following cerebrovascular accident (CVA) (H)  Comment: stable  Plan:   -not anticoagulated secondary to falls  -metoprolol 12.5 mg po BID  -BMP periodically   -off statin     (R21) Rash and nonspecific skin eruption  Comment: chronic and ongoing   Plan:  -vanicream BID and prn   -soft mitts overnight  -monitor for open areas     (F01.50) Vascular dementia without behavioral disturbance (H)  Comment: advanced  Plan:  -appears comfortable      Electronically signed by:  HIGINIO Pena CNP     Video-Visit Details  Type of service:  Video Visit  Video End Time (time video stopped): 3:30 PM  Distant Location (provider location):  [facility]           Sincerely,        HIGINIO Pena CNP

## 2020-04-21 NOTE — PROGRESS NOTES
"[facility]   April Donnelly is being evaluated via a billable video visit due to the restrictions of the Covid-19 pandemic.   The patient has been notified of following:  This video visit will be conducted via a call between you and your provider. We have found that certain health care needs can be provided without the need for an in-person physical exam.  This service lets us provide the care you need with a video conversation. If during the course of the call the provider feels a video visit is not appropriate, you will not be charged for this service.\"   The provider has received verbal consent for a Video Visit from the patient or first contact? Yes  Patient  or facility staff would like the video invitation sent by: N/A   Video Start Time: 3:27 PM    [place] Medical Record Number:  [id number]  [facility]  Chief Complaint   Patient presents with     RECHECK     HPI:  April Donnelly  is a 89 year old (1/30/1930), who is being seen today for a visit.  HPI information obtained from: facility chart records, facility staff and [facility] chart review. Today's concern is:    Follow up to chronic condition and newer onset of teeth grinding. Does have scheduled tylenol somewhat effective.  Pedal edema improved with use of compression socks. Sitting in high-back WC, continues to feed herself at times, less expressive, progressively flat affect. History of pruritus improved with adaptive clothing and gloves/mitts overnight. No longer ambulatory which has lessened fall risk. History of falls and suffered acetabular and hip fracture in 1/2017. She had another fall in the DR here in August 2018, and suffered a proximal right femur fracture for which she underwent a right bipolar hemiarthroplasty. She had PHYSICAL THERAPY, but has not ambulated since then. Weight stable 97-99 lbs. BP average 120/73 HR 66-84.     Past Medical and " "Surgical History reviewed in Epic today.  MEDICATIONS:    Current Outpatient Medications   Medication Sig Dispense Refill     acetaminophen (TYLENOL) 325 MG tablet Take 650 mg by mouth 3 times daily  mg daily PRN       CALMOSEPTINE 0.44-20.6 % OINT ointment APPLY TOPICALLY TO COCCYX TWICE DAILY AND WITH ALL INCONTINENT CHANGES FOR SKIN ITCH AND BREAKDOWN 113 g 97     camphor-menthol (SARNA) 0.5-0.5 % external lotion APPLY LOTION TO BODY DAILY 222 mL 97     Emollient (VANICREAM EX) Apply topically 2 times daily to affected areas and PRN       levothyroxine (SYNTHROID/LEVOTHROID) 50 MCG tablet Take 1 tablet (50 mcg) by mouth daily 31 tablet 98     loperamide (IMODIUM A-D) 2 MG tablet Take 2 mg by mouth 2 times daily as needed for diarrhea       metoprolol tartrate (LOPRESSOR) 25 MG tablet TAKE ONE-HALF TABLET (12.5MG) BY MOUTH TWICE DAILY *OK TO CRUSH* 31 tablet PRN     sennosides (SENOKOT) 8.6 MG tablet TAKE 1 TABLET BY MOUTH ONCE DAILY AS NEEDED 30 tablet 97     docusate sodium (COLACE) 50 MG capsule Take 50 mg by mouth as needed for constipation       REVIEW OF SYSTEMS: Unobtainable secondary to cognitive impairment.  and Unobtainable secondary to aphasia.   Objective: /89   Pulse 74   Temp 96.5  F (35.8  C)   Resp 17   Ht 1.575 m (5' 2.01\")   Wt 44.8 kg (98 lb 12.8 oz)   BMI 18.06 kg/m    Limited visit exam done given COVID-19 precautions.   GENERAL APPEARANCE: calm and quiet,alert  ENT:  Mouth and posterior oropharynx normal, dry mucous membranes, [place]  EYES:  EOM, conjunctivae normal,  pupils and irises normal, glasses on  RESP:  no s/s of respiratory distress   CV: at baseline per staff  ABDOMEN: at baseline per staff  M/S: passive ROM to UE and LE with no s/s of pain  SKIN: unable to examine much of skin today-staff reports at baseline  NEURO: CLAUDIA  PSYCH:  insight and judgement impaired, memory impaired    Labs:   CBC RESULTS:   Recent Labs   Lab Test 07/25/19 09/10/18  1425   WBC 6.0 6.6 "   RBC 4.41 3.94   HGB 13.7 12.1   HCT 40.7 38.7   MCV 92 98   MCH 31.1 30.7   MCHC 33.7 31.3*   RDW 12.9 13.9    359       Last Basic Metabolic Panel:  Recent Labs   Lab Test 07/25/19 09/10/18  1425    139   POTASSIUM 3.7 4.3   CHLORIDE 108 106   BOLA 9.2 9.0   CO2 27 26   BUN 21 21   CR 0.59 0.66   GLC 70 94       Liver Function Studies -   Recent Labs   Lab Test 07/25/19 04/26/17   PROTTOTAL 6.6* 6.2*   ALBUMIN 3.5 3.1*   BILITOTAL 0.6 0.4   ALKPHOS 109 123   AST 20 22   ALT 14 18       TSH   Date Value Ref Range Status   08/23/2018 1.48 0.40 - 4.00 mU/L Final   04/26/2017 1.89 0.40 - 4.00 mU/L Final       No results found for: A1C    ASSESSMENT/PLAN:    (I48.11) Longstanding persistent atrial fibrillation  (I10) Benign essential hypertension  (Z86.73) H/O: CVA (cerebrovascular accident)  (I69.359) Hemiparesis following cerebrovascular accident (CVA) (H)  Comment: stable  Plan:   -not anticoagulated secondary to falls  -metoprolol 12.5 mg po BID  -BMP periodically   -off statin     (R21) Rash and nonspecific skin eruption  Comment: chronic and ongoing   Plan:  -vanicream BID and prn   -soft mitts overnight  -monitor for open areas     (F01.50) Vascular dementia without behavioral disturbance (H)  Comment: advanced  Plan:  -appears comfortable      Electronically signed by:  HIGINIO Pena CNP     Video-Visit Details  Type of service:  Video Visit  Video End Time (time video stopped): 3:30 PM  Distant Location (provider location):  [facility]

## 2020-04-21 NOTE — LETTER
"    4/21/2020        RE: April Donnelly  [facility]  [address] [facility] [address]        [place] [facility]   April Donnelly is being evaluated via a billable video visit due to the restrictions of the Covid-19 pandemic.   The patient has been notified of following:  This video visit will be conducted via a call between you and your provider. We have found that certain health care needs can be provided without the need for an in-person physical exam.  This service lets us provide the care you need with a video conversation. If during the course of the call the provider feels a video visit is not appropriate, you will not be charged for this service.\"   The provider has received verbal consent for a Video Visit from the patient or first contact? Yes  Patient  or facility staff would like the video invitation sent by: N/A   Video Start Time: {video visit start time:787925}    [place] Medical Record Number:  [id number]  Place of Location at the time of visit: [facility] -memory care  Chief Complaint   Patient presents with     RECHECK     HPI:  April Donnelly  is a 89 year old (1/30/1930), who is being seen today for a visit.  HPI information obtained from: facility chart records, facility staff and [facility] chart review. Today's concern is:    Follow up to chronic condition and newer onset of teeth grinding. Does have scheduled tylenol somewhat effective.  Pedal edema improved with use of compression socks. Sitting in high-back WC, continues to feed herself at times, less expressive, progressively flat affect. History of pruritus improved with adaptive clothing and gloves/mitts overnight. No longer ambulatory which has lessened fall risk. History of falls and suffered acetabular and hip fracture in 1/2017. She had another fall in the  here in August 2018, and suffered a proximal right femur fracture for which she underwent a right bipolar " "hemiarthroplasty. She had PHYSICAL THERAPY, but has not ambulated since then. Weight stable 97-99 lbs. BP average 120/73 HR 66-84.     Past Medical and Surgical History reviewed in Epic today.  MEDICATIONS:    Current Outpatient Medications   Medication Sig Dispense Refill     acetaminophen (TYLENOL) 325 MG tablet Take 650 mg by mouth 3 times daily  mg daily PRN       CALMOSEPTINE 0.44-20.6 % OINT ointment APPLY TOPICALLY TO COCCYX TWICE DAILY AND WITH ALL INCONTINENT CHANGES FOR SKIN ITCH AND BREAKDOWN 113 g 97     camphor-menthol (SARNA) 0.5-0.5 % external lotion APPLY LOTION TO BODY DAILY 222 mL 97     Emollient (VANICREAM EX) Apply topically 2 times daily to affected areas and PRN       levothyroxine (SYNTHROID/LEVOTHROID) 50 MCG tablet Take 1 tablet (50 mcg) by mouth daily 31 tablet 98     loperamide (IMODIUM A-D) 2 MG tablet Take 2 mg by mouth 2 times daily as needed for diarrhea       metoprolol tartrate (LOPRESSOR) 25 MG tablet TAKE ONE-HALF TABLET (12.5MG) BY MOUTH TWICE DAILY *OK TO CRUSH* 31 tablet PRN     sennosides (SENOKOT) 8.6 MG tablet TAKE 1 TABLET BY MOUTH ONCE DAILY AS NEEDED 30 tablet 97     docusate sodium (COLACE) 50 MG capsule Take 50 mg by mouth as needed for constipation       REVIEW OF SYSTEMS: Unobtainable secondary to cognitive impairment.  and Unobtainable secondary to aphasia.   Objective: /89   Pulse 74   Temp 96.5  F (35.8  C)   Resp 17   Ht 1.575 m (5' 2.01\")   Wt 44.8 kg (98 lb 12.8 oz)   BMI 18.06 kg/m    Limited visit exam done given COVID-19 precautions.   GENERAL APPEARANCE: calm and quiet,sleepy  ENT:  Mouth and posterior oropharynx normal, dry mucous membranes, [place]  EYES:  EOM, conjunctivae normal,  pupils and irises normal, glasses on  RESP:  respiratory effort and palpation of chest normal, lungs grossly clear but diminished   CV:  Palpation and auscultation of heart done , trace edema, grade 2/6 murmur, irregular rhythm, regular rate  ABDOMEN: soft, " "nontender, bowel sounds present  M/S: passive ROM to UE and LE with no s/s of pain  SKIN: unable to examine much of skin today-staff reports at baseline  NEURO: CLAUDIA  PSYCH:  insight and judgement impaired, memory impaired    Labs:   CBC RESULTS:   Recent Labs   Lab Test 07/25/19 09/10/18  1425   WBC 6.0 6.6   RBC 4.41 3.94   HGB 13.7 12.1   HCT 40.7 38.7   MCV 92 98   MCH 31.1 30.7   MCHC 33.7 31.3*   RDW 12.9 13.9    359       Last Basic Metabolic Panel:  Recent Labs   Lab Test 07/25/19 09/10/18  1425    139   POTASSIUM 3.7 4.3   CHLORIDE 108 106   BOLA 9.2 9.0   CO2 27 26   BUN 21 21   CR 0.59 0.66   GLC 70 94       Liver Function Studies -   Recent Labs   Lab Test 07/25/19 04/26/17   PROTTOTAL 6.6* 6.2*   ALBUMIN 3.5 3.1*   BILITOTAL 0.6 0.4   ALKPHOS 109 123   AST 20 22   ALT 14 18       TSH   Date Value Ref Range Status   08/23/2018 1.48 0.40 - 4.00 mU/L Final   04/26/2017 1.89 0.40 - 4.00 mU/L Final       No results found for: A1C    ASSESSMENT/PLAN:  {FGS DX:[id number]}      Electronically signed by:  HIGINIO Pena CNP     Video-Visit Details  Type of service:  Video Visit  Video End Time (time video stopped): ***  Distant Location (provider location):  [facility]         [facility]   April Donnelly is being evaluated via a billable video visit due to the restrictions of the Covid-19 pandemic.   The patient has been notified of following:  This video visit will be conducted via a call between you and your provider. We have found that certain health care needs can be provided without the need for an in-person physical exam.  This service lets us provide the care you need with a video conversation. If during the course of the call the provider feels a video visit is not appropriate, you will not be charged for this service.\"   The provider has received verbal consent for a Video Visit from the patient or first contact? Yes  Patient  or facility staff would like " the video invitation sent by: N/A   Video Start Time: 3:27 PM    [place] Medical Record Number:  [id number]  Place of Location at the time of visit: [facility] -memory care  Chief Complaint   Patient presents with     RECHECK     HPI:  April Donnelly  is a 89 year old (1/30/1930), who is being seen today for a visit.  HPI information obtained from: facility chart records, facility staff and [facility] chart review. Today's concern is:    Follow up to chronic condition and newer onset of teeth grinding. Does have scheduled tylenol somewhat effective.  Pedal edema improved with use of compression socks. Sitting in high-back WC, continues to feed herself at times, less expressive, progressively flat affect. History of pruritus improved with adaptive clothing and gloves/mitts overnight. No longer ambulatory which has lessened fall risk. History of falls and suffered acetabular and hip fracture in 1/2017. She had another fall in the DR here in August 2018, and suffered a proximal right femur fracture for which she underwent a right bipolar hemiarthroplasty. She had PHYSICAL THERAPY, but has not ambulated since then. Weight stable 97-99 lbs. BP average 120/73 HR 66-84.     Past Medical and Surgical History reviewed in Epic today.  MEDICATIONS:    Current Outpatient Medications   Medication Sig Dispense Refill     acetaminophen (TYLENOL) 325 MG tablet Take 650 mg by mouth 3 times daily  mg daily PRN       CALMOSEPTINE 0.44-20.6 % OINT ointment APPLY TOPICALLY TO COCCYX TWICE DAILY AND WITH ALL INCONTINENT CHANGES FOR SKIN ITCH AND BREAKDOWN 113 g 97     camphor-menthol (SARNA) 0.5-0.5 % external lotion APPLY LOTION TO BODY DAILY 222 mL 97     Emollient (VANICREAM EX) Apply topically 2 times daily to affected areas and PRN       levothyroxine (SYNTHROID/LEVOTHROID) 50 MCG tablet Take 1 tablet (50 mcg) by mouth daily 31 tablet 98     loperamide (IMODIUM A-D) 2 MG tablet Take 2 mg by mouth 2  "times daily as needed for diarrhea       metoprolol tartrate (LOPRESSOR) 25 MG tablet TAKE ONE-HALF TABLET (12.5MG) BY MOUTH TWICE DAILY *OK TO CRUSH* 31 tablet PRN     sennosides (SENOKOT) 8.6 MG tablet TAKE 1 TABLET BY MOUTH ONCE DAILY AS NEEDED 30 tablet 97     docusate sodium (COLACE) 50 MG capsule Take 50 mg by mouth as needed for constipation       REVIEW OF SYSTEMS: Unobtainable secondary to cognitive impairment.  and Unobtainable secondary to aphasia.   Objective: /89   Pulse 74   Temp 96.5  F (35.8  C)   Resp 17   Ht 1.575 m (5' 2.01\")   Wt 44.8 kg (98 lb 12.8 oz)   BMI 18.06 kg/m    Limited visit exam done given COVID-19 precautions.   GENERAL APPEARANCE: calm and quiet,alert  ENT:  Mouth and posterior oropharynx normal, dry mucous membranes, [place]  EYES:  EOM, conjunctivae normal,  pupils and irises normal, glasses on  RESP:  no s/s of respiratory distress   CV: at baseline per staff  ABDOMEN: at baseline per staff  M/S: passive ROM to UE and LE with no s/s of pain  SKIN: unable to examine much of skin today-staff reports at baseline  NEURO: CLAUDIA  PSYCH:  insight and judgement impaired, memory impaired    Labs:   CBC RESULTS:   Recent Labs   Lab Test 07/25/19 09/10/18  1425   WBC 6.0 6.6   RBC 4.41 3.94   HGB 13.7 12.1   HCT 40.7 38.7   MCV 92 98   MCH 31.1 30.7   MCHC 33.7 31.3*   RDW 12.9 13.9    359       Last Basic Metabolic Panel:  Recent Labs   Lab Test 07/25/19 09/10/18  1425    139   POTASSIUM 3.7 4.3   CHLORIDE 108 106   BOLA 9.2 9.0   CO2 27 26   BUN 21 21   CR 0.59 0.66   GLC 70 94       Liver Function Studies -   Recent Labs   Lab Test 07/25/19 04/26/17   PROTTOTAL 6.6* 6.2*   ALBUMIN 3.5 3.1*   BILITOTAL 0.6 0.4   ALKPHOS 109 123   AST 20 22   ALT 14 18       TSH   Date Value Ref Range Status   08/23/2018 1.48 0.40 - 4.00 mU/L Final   04/26/2017 1.89 0.40 - 4.00 mU/L Final       No results found for: A1C    ASSESSMENT/PLAN:    (I48.11) Longstanding persistent atrial " fibrillation  (I10) Benign essential hypertension  (Z86.73) H/O: CVA (cerebrovascular accident)  (I69.359) Hemiparesis following cerebrovascular accident (CVA) (H)  Comment: stable  Plan:   -not anticoagulated secondary to falls  -metoprolol 12.5 mg po BID  -BMP periodically   -off statin     (R21) Rash and nonspecific skin eruption  Comment: chronic and ongoing   Plan:  -vanicream BID and prn   -soft mitts overnight  -monitor for open areas     (F01.50) Vascular dementia without behavioral disturbance (H)  Comment: advanced  Plan:  -appears comfortable      Electronically signed by:  HIGINIO Pena CNP     Video-Visit Details  Type of service:  Video Visit  Video End Time (time video stopped): 3:30 PM  Distant Location (provider location):  [place]           Sincerely,        HIGINIO Pena CNP

## 2020-05-01 DIAGNOSIS — R52 GENERALIZED PAIN: Primary | ICD-10-CM

## 2020-05-03 RX ORDER — ACETAMINOPHEN 325 MG/1
TABLET ORAL
Qty: 168 TABLET | Refills: 97 | Status: SHIPPED | OUTPATIENT
Start: 2020-05-03 | End: 2020-07-28

## 2020-05-20 DIAGNOSIS — L85.3 DRY SKIN: Primary | ICD-10-CM

## 2020-05-20 RX ORDER — EMOLLIENT BASE
CREAM (GRAM) TOPICAL
Qty: 453 G | Refills: 97 | Status: SHIPPED | OUTPATIENT
Start: 2020-05-20 | End: 2020-01-01

## 2020-05-26 ENCOUNTER — DOCUMENTATION ONLY (OUTPATIENT)
Dept: GERIATRICS | Facility: CLINIC | Age: 85
End: 2020-05-26

## 2020-05-26 ENCOUNTER — TRANSFERRED RECORDS (OUTPATIENT)
Dept: HEALTH INFORMATION MANAGEMENT | Facility: CLINIC | Age: 85
End: 2020-05-26

## 2020-05-26 LAB
ANION GAP SERPL CALCULATED.3IONS-SCNC: 6 MMOL/L (ref 3–14)
BUN SERPL-MCNC: 20 MG/DL (ref 7–30)
CALCIUM SERPL-MCNC: 9 MG/DL (ref 8.5–10.1)
CHLORIDE SERPLBLD-SCNC: 106 MMOL/L (ref 94–109)
CO2 SERPL-SCNC: 26 MMOL/L (ref 20–32)
CREAT SERPL-MCNC: 0.61 MG/DL (ref 0.52–1.04)
DIFFERENTIAL: NORMAL
ERYTHROCYTE [DISTWIDTH] IN BLOOD BY AUTOMATED COUNT: 13.7 % (ref 10–15)
GFR SERPL CREATININE-BSD FRML MDRD: 80 ML/MIN/1.73_M2
GLUCOSE SERPL-MCNC: 111 MG/DL (ref 70–99)
HCT VFR BLD AUTO: 40.5 % (ref 35–47)
HEMOGLOBIN: 12.9 G/DL (ref 11.7–15.7)
MCH RBC QN AUTO: 30.3 PG (ref 26.5–33)
MCHC RBC AUTO-ENTMCNC: 31.9 G/DL (ref 31.5–36.5)
MCV RBC AUTO: 95 FL (ref 78–100)
PLATELET # BLD AUTO: 312 10E9/L (ref 150–450)
POTASSIUM SERPL-SCNC: 3.2 MMOL/L (ref 3.4–5.3)
RBC # BLD AUTO: 4.26 10E12/L (ref 3.8–5.2)
SODIUM SERPL-SCNC: 138 MMOL/L (ref 133–144)
TSH SERPL-ACNC: 1.82 MU/L (ref 0.4–4)
WBC # BLD AUTO: 6.3 10E9/L (ref 4–11)

## 2020-05-26 NOTE — PROGRESS NOTES
Potassium returned at 3.2  TSH 1.82    Plan:   --Potassium 40 mEq one time today with recheck on next lab day  --decrease levothyroxine to 37.5 mcg per day to target goal TSH of 4-5.   --recheck TSH in 8 weeks.     HIGINIO Zhang CNP

## 2020-05-28 ENCOUNTER — TRANSFERRED RECORDS (OUTPATIENT)
Dept: HEALTH INFORMATION MANAGEMENT | Facility: CLINIC | Age: 85
End: 2020-05-28

## 2020-05-28 ENCOUNTER — VIRTUAL VISIT (OUTPATIENT)
Dept: GERIATRICS | Facility: CLINIC | Age: 85
End: 2020-05-28
Payer: MEDICARE

## 2020-05-28 VITALS
RESPIRATION RATE: 16 BRPM | DIASTOLIC BLOOD PRESSURE: 90 MMHG | HEART RATE: 73 BPM | TEMPERATURE: 85.8 F | WEIGHT: 95.4 LBS | BODY MASS INDEX: 17.55 KG/M2 | SYSTOLIC BLOOD PRESSURE: 113 MMHG | HEIGHT: 62 IN

## 2020-05-28 DIAGNOSIS — E87.6 HYPOKALEMIA: ICD-10-CM

## 2020-05-28 DIAGNOSIS — R63.4 WEIGHT LOSS: ICD-10-CM

## 2020-05-28 DIAGNOSIS — I10 BENIGN ESSENTIAL HYPERTENSION: ICD-10-CM

## 2020-05-28 DIAGNOSIS — Z86.73 H/O: CVA (CEREBROVASCULAR ACCIDENT): ICD-10-CM

## 2020-05-28 DIAGNOSIS — R53.83 LETHARGY: Primary | ICD-10-CM

## 2020-05-28 DIAGNOSIS — K59.01 SLOW TRANSIT CONSTIPATION: ICD-10-CM

## 2020-05-28 DIAGNOSIS — E03.8 OTHER SPECIFIED HYPOTHYROIDISM: ICD-10-CM

## 2020-05-28 DIAGNOSIS — I48.11 LONGSTANDING PERSISTENT ATRIAL FIBRILLATION (H): ICD-10-CM

## 2020-05-28 LAB — POTASSIUM SERPL-SCNC: 3.3 MMOL/L (ref 3.4–5.3)

## 2020-05-28 RX ORDER — POTASSIUM CHLORIDE 1.5 G/1.58G
20 POWDER, FOR SOLUTION ORAL DAILY
Qty: 30 PACKET | Refills: 3 | Status: SHIPPED | OUTPATIENT
Start: 2020-05-28 | End: 2020-06-24

## 2020-05-28 RX ORDER — LEVOTHYROXINE SODIUM 75 UG/1
37.5 TABLET ORAL DAILY
COMMUNITY
Start: 2020-05-28 | End: 2020-05-30

## 2020-05-28 RX ORDER — SENNOSIDES 8.6 MG
1 TABLET ORAL
Qty: 30 TABLET | Refills: 97 | Status: SHIPPED | OUTPATIENT
Start: 2020-05-29 | End: 2020-01-01

## 2020-05-28 RX ORDER — METOPROLOL TARTRATE 25 MG/1
6.25 TABLET, FILM COATED ORAL 2 TIMES DAILY
Qty: 31 TABLET | Status: SHIPPED | OUTPATIENT
Start: 2020-05-28 | End: 2020-08-04

## 2020-05-28 ASSESSMENT — MIFFLIN-ST. JEOR: SCORE: 806.14

## 2020-05-28 NOTE — LETTER
"    5/28/2020        RE: April Donnelly  [facility]  [address] [address]        [place] [facility]   April Donnelly is being evaluated via a billable video visit due to the restrictions of the Covid-19 pandemic.   The patient has been notified of following:  This video visit will be conducted via a call between you and your provider. We have found that certain health care needs can be provided without the need for an in-person physical exam.  This service lets us provide the care you need with a video conversation. If during the course of the call the provider feels a video visit is not appropriate, you will not be charged for this service.\"   The provider has received verbal consent for a Video Visit from the patient or first contact? Yes  Patient  or facility staff would like the video invitation sent by: N/A   Video Start Time: 1:24 PM    [place] Medical Record Number:  [id number]  Place of Location at the time of visit: [facility] -memory care  Chief Complaint   Patient presents with     Nursing Home Acute     HPI:  April Donnelly  is a 89 year old (1/30/1930), who is being seen today for a visit.  HPI information obtained from: facility chart records, facility staff and [facility] chart review. Today's concern is:    Ongoing lethargy, poor oral intake, weight loss, not \"acting herself\" according to staff. She is mostly non-verbal at baseline with loss of language. Recent labs with lower potassium 3.2. She did receive replacement. Staff unable to get UA for testing and I did start empiric treatment for UTI with s/s of infection per staff. Questioning last temp reading for accuracy? Recent reduction to levothyroxine. She appears mostly at baseline. Refusing pills at time per staff (not new). With some grinding of teeth but does not appear in pain.    Past Medical and Surgical History reviewed in Epic today.  MEDICATIONS:    Current " "Outpatient Medications   Medication Sig Dispense Refill     levothyroxine (SYNTHROID/LEVOTHROID) 75 MCG tablet Take 0.5 tablets (37.5 mcg) by mouth daily       metoprolol tartrate (LOPRESSOR) 25 MG tablet Take 0.25 tablets (6.25 mg) by mouth 2 times daily 31 tablet PRN     potassium chloride (KLOR-CON) 20 MEQ packet Take 20 mEq by mouth daily 30 packet 3     [START ON 5/29/2020] sennosides (SENOKOT) 8.6 MG tablet Take 1 tablet by mouth three times a week And daily prn 30 tablet 97     acetaminophen (TYLENOL) 325 MG tablet TAKE TWO TABLETS (650MG) BY MOUTH THREE TIMES DAILY;TAKE TWO TABLETS (650MG) BY MOUTH ONCE DAILY AS NEEDED FOR PAIN 168 tablet 97     CALMOSEPTINE 0.44-20.6 % OINT ointment APPLY TOPICALLY TO COCCYX TWICE DAILY AND WITH ALL INCONTINENT CHANGES FOR SKIN ITCH AND BREAKDOWN 113 g 97     Emollient (VANICREAM EX) Apply topically 2 times daily to affected areas and PRN       emollient (VANICREAM) external cream APPLY TOPICALLY TO AFFECTED AREA(S) TWICE DAILY;AND AS NEEDED 453 g 97     loperamide (IMODIUM A-D) 2 MG tablet Take 2 mg by mouth 2 times daily as needed for diarrhea       REVIEW OF SYSTEMS: Unobtainable secondary to cognitive impairment.   Objective: BP (!) 113/90   Pulse 73   Temp (!) 85.8  F (29.9  C)   Resp 16   Ht 1.575 m (5' 2.01\")   Wt 43.3 kg (95 lb 6.4 oz)   BMI 17.44 kg/m    Limited visit exam done given COVID-19 precautions.   GENERAL APPEARANCE: calm and quiet,alert  ENT:  Mouth and posterior oropharynx normal, dry mucous membranes, [place]  EYES:  EOM, conjunctivae normal,  pupils and irises normal, glasses on  RESP:  no s/s of respiratory distress   CV: at baseline per staff, legs not seen today  ABDOMEN: at baseline per staff  M/S: passive ROM to UE and LE with no s/s of pain  SKIN: unable to examine much of skin today-staff reports at baseline  NEURO: CLAUDIA  PSYCH:  insight and judgement impaired, memory impaired    Labs:   CBC RESULTS:   Recent Labs   Lab Test 05/26/20 07/25/19 "   WBC 6.3 6.0   RBC 4.26 4.41   HGB 12.9 13.7   HCT 40.5 40.7   MCV 95 92   MCH 30.3 31.1   MCHC 31.9 33.7   RDW 13.7 12.9    194       Last Basic Metabolic Panel:  Recent Labs   Lab Test 05/26/20 07/25/19    142   POTASSIUM 3.2* 3.7   CHLORIDE 106 108   BOLA 9.0 9.2   CO2 26 27   BUN 20 21   CR 0.61 0.59   * 70       Liver Function Studies -   Recent Labs   Lab Test 07/25/19 04/26/17   PROTTOTAL 6.6* 6.2*   ALBUMIN 3.5 3.1*   BILITOTAL 0.6 0.4   ALKPHOS 109 123   AST 20 22   ALT 14 18       TSH   Date Value Ref Range Status   05/26/2020 1.82 0.40 - 4.00 mU/L Final   08/23/2018 1.48 0.40 - 4.00 mU/L Final       No results found for: A1C    ASSESSMENT/PLAN:  (R53.83) Lethargy  (primary encounter diagnosis)  Comment: acute change started 5/22. Potassium replaced. Unable to obtain UA/UC  Plan:   -Rocephin IM x 3 days and monitor for improvement  -staff to assist with oral intake of food and fluids     (R63.4) Weight loss  Comment: slow ongoing, needs pills crushed, on mechanical soft with thickened liquids   Plan:   -monitor weights, staff assist with meals, ensure if family agreeable     (K59.01) Slow transit constipation  Comment: last bm was XL on 5/25. History of loose stools now constipation  Plan:   -Senokot to three times weekly and prn    (E03.8) Other specified hypothyroidism  Comment: chronic  Plan:   -on lower dose replacement   -TSH recheck 8 weeks    (Z86.73) H/O: CVA (cerebrovascular accident)  (I10) Benign essential hypertension  (I48.11) Longstanding persistent atrial fibrillation  Comment: Chronic. BP average 112/67 HR 75  Plan:   -metoprolol reduced to 6.25 mg po BID. On for rate control. Not anticoagulated 2/2 falls   -BMP periodically         Electronically signed by:  HIGINIO Pena CNP     Video-Visit Details  Type of service:  Video Visit  Video End Time (time video stopped): 1:35 PM  Distant Location (provider location):  [facility]              Sincerely,        HIGINIO Pena CNP

## 2020-05-28 NOTE — PROGRESS NOTES
"[place]   April Donnelly is being evaluated via a billable video visit due to the restrictions of the Covid-19 pandemic.   The patient has been notified of following:  This video visit will be conducted via a call between you and your provider. We have found that certain health care needs can be provided without the need for an in-person physical exam.  This service lets us provide the care you need with a video conversation. If during the course of the call the provider feels a video visit is not appropriate, you will not be charged for this service.\"   The provider has received verbal consent for a Video Visit from the patient or first contact? Yes  Patient  or facility staff would like the video invitation sent by: N/A   Video Start Time: 1:24 PM    [place] Medical Record Number:  [id number]  Place of Location at the time of visit: [facility] -memory care  Chief Complaint   Patient presents with     Nursing Home Acute     HPI:  April Donnelly  is a 89 year old (1/30/1930), who is being seen today for a visit.  HPI information obtained from: facility chart records, facility staff and [facility] chart review. Today's concern is:    Ongoing lethargy, poor oral intake, weight loss, not \"acting herself\" according to staff. She is mostly non-verbal at baseline with loss of language. Recent labs with lower potassium 3.2. She did receive replacement. Staff unable to get UA for testing and I did start empiric treatment for UTI with s/s of infection per staff. Questioning last temp reading for accuracy? Recent reduction to levothyroxine. She appears mostly at baseline. Refusing pills at time per staff (not new). With some grinding of teeth but does not appear in pain.    Past Medical and Surgical History reviewed in Epic today.  MEDICATIONS:    Current Outpatient Medications   Medication Sig Dispense Refill     levothyroxine (SYNTHROID/LEVOTHROID) 75 MCG tablet Take 0.5 " "tablets (37.5 mcg) by mouth daily       metoprolol tartrate (LOPRESSOR) 25 MG tablet Take 0.25 tablets (6.25 mg) by mouth 2 times daily 31 tablet PRN     potassium chloride (KLOR-CON) 20 MEQ packet Take 20 mEq by mouth daily 30 packet 3     [START ON 5/29/2020] sennosides (SENOKOT) 8.6 MG tablet Take 1 tablet by mouth three times a week And daily prn 30 tablet 97     acetaminophen (TYLENOL) 325 MG tablet TAKE TWO TABLETS (650MG) BY MOUTH THREE TIMES DAILY;TAKE TWO TABLETS (650MG) BY MOUTH ONCE DAILY AS NEEDED FOR PAIN 168 tablet 97     CALMOSEPTINE 0.44-20.6 % OINT ointment APPLY TOPICALLY TO COCCYX TWICE DAILY AND WITH ALL INCONTINENT CHANGES FOR SKIN ITCH AND BREAKDOWN 113 g 97     Emollient (VANICREAM EX) Apply topically 2 times daily to affected areas and PRN       emollient (VANICREAM) external cream APPLY TOPICALLY TO AFFECTED AREA(S) TWICE DAILY;AND AS NEEDED 453 g 97     loperamide (IMODIUM A-D) 2 MG tablet Take 2 mg by mouth 2 times daily as needed for diarrhea       REVIEW OF SYSTEMS: Unobtainable secondary to cognitive impairment.   Objective: BP (!) 113/90   Pulse 73   Temp (!) 85.8  F (29.9  C)   Resp 16   Ht 1.575 m (5' 2.01\")   Wt 43.3 kg (95 lb 6.4 oz)   BMI 17.44 kg/m    Limited visit exam done given COVID-19 precautions.   GENERAL APPEARANCE: calm and quiet,alert  ENT:  Mouth and posterior oropharynx normal, dry mucous membranes, [place]  EYES:  EOM, conjunctivae normal,  pupils and irises normal, glasses on  RESP:  no s/s of respiratory distress   CV: at baseline per staff, legs not seen today  ABDOMEN: at baseline per staff  M/S: passive ROM to UE and LE with no s/s of pain  SKIN: unable to examine much of skin today-staff reports at baseline  NEURO: CLAUDIA  PSYCH:  insight and judgement impaired, memory impaired    Labs:   CBC RESULTS:   Recent Labs   Lab Test 05/26/20 07/25/19   WBC 6.3 6.0   RBC 4.26 4.41   HGB 12.9 13.7   HCT 40.5 40.7   MCV 95 92   MCH 30.3 31.1   MCHC 31.9 33.7   RDW 13.7 " 12.9    194       Last Basic Metabolic Panel:  Recent Labs   Lab Test 05/26/20 07/25/19    142   POTASSIUM 3.2* 3.7   CHLORIDE 106 108   BOLA 9.0 9.2   CO2 26 27   BUN 20 21   CR 0.61 0.59   * 70       Liver Function Studies -   Recent Labs   Lab Test 07/25/19 04/26/17   PROTTOTAL 6.6* 6.2*   ALBUMIN 3.5 3.1*   BILITOTAL 0.6 0.4   ALKPHOS 109 123   AST 20 22   ALT 14 18       TSH   Date Value Ref Range Status   05/26/2020 1.82 0.40 - 4.00 mU/L Final   08/23/2018 1.48 0.40 - 4.00 mU/L Final       No results found for: A1C    ASSESSMENT/PLAN:  (R53.83) Lethargy  (primary encounter diagnosis)  Comment: acute change started 5/22. Potassium replaced. Unable to obtain UA/UC  Plan:   -Rocephin IM x 3 days and monitor for improvement  -staff to assist with oral intake of food and fluids     (R63.4) Weight loss  Comment: slow ongoing, needs pills crushed, on mechanical soft with thickened liquids   Plan:   -monitor weights, staff assist with meals, ensure if family agreeable     (K59.01) Slow transit constipation  Comment: last bm was XL on 5/25. History of loose stools now constipation  Plan:   -Senokot to three times weekly and prn    (E03.8) Other specified hypothyroidism  Comment: chronic  Plan:   -on lower dose replacement   -TSH recheck 8 weeks    (Z86.73) H/O: CVA (cerebrovascular accident)  (I10) Benign essential hypertension  (I48.11) Longstanding persistent atrial fibrillation  Comment: Chronic. BP average 112/67 HR 75  Plan:   -metoprolol reduced to 6.25 mg po BID. On for rate control. Not anticoagulated 2/2 falls   -BMP periodically         Electronically signed by:  HIGINIO Pena CNP     Video-Visit Details  Type of service:  Video Visit  Video End Time (time video stopped): 1:35 PM  Distant Location (provider location):  [facility]

## 2020-05-30 DIAGNOSIS — E03.8 OTHER SPECIFIED HYPOTHYROIDISM: ICD-10-CM

## 2020-05-30 RX ORDER — LEVOTHYROXINE SODIUM 75 UG/1
TABLET ORAL
Qty: 14.5 TABLET | Refills: 97 | Status: SHIPPED | OUTPATIENT
Start: 2020-05-30 | End: 2021-01-01

## 2020-06-04 ENCOUNTER — TRANSFERRED RECORDS (OUTPATIENT)
Dept: HEALTH INFORMATION MANAGEMENT | Facility: CLINIC | Age: 85
End: 2020-06-04

## 2020-06-04 LAB
ANION GAP SERPL CALCULATED.3IONS-SCNC: 6 MMOL/L (ref 3–14)
BUN SERPL-MCNC: 18 MG/DL (ref 7–30)
CALCIUM SERPL-MCNC: 9 MG/DL (ref 8.5–10.1)
CHLORIDE SERPLBLD-SCNC: 108 MMOL/L (ref 94–109)
CO2 SERPL-SCNC: 25 MMOL/L (ref 20–32)
CREAT SERPL-MCNC: 0.64 MG/DL (ref 0.52–1.04)
GFR SERPL CREATININE-BSD FRML MDRD: 78 ML/MIN/1.73_M2
GLUCOSE SERPL-MCNC: 95 MG/DL (ref 70–99)
POTASSIUM SERPL-SCNC: 3.9 MMOL/L (ref 3.4–5.3)
SODIUM SERPL-SCNC: 139 MMOL/L (ref 133–144)

## 2020-06-09 DIAGNOSIS — R21 RASH: ICD-10-CM

## 2020-06-09 RX ORDER — ANORECTAL OINTMENT 15.7; .44; 24; 20.6 G/100G; G/100G; G/100G; G/100G
OINTMENT TOPICAL
Qty: 113 G | Refills: 97 | Status: SHIPPED | OUTPATIENT
Start: 2020-06-09 | End: 2021-01-01

## 2020-06-24 DIAGNOSIS — E87.6 HYPOKALEMIA: ICD-10-CM

## 2020-06-24 RX ORDER — POTASSIUM CHLORIDE 1.5 G/1.58G
POWDER, FOR SOLUTION ORAL
Qty: 30 PACKET | Refills: 97 | Status: SHIPPED | OUTPATIENT
Start: 2020-06-24 | End: 2021-01-01

## 2020-07-23 ENCOUNTER — TRANSFERRED RECORDS (OUTPATIENT)
Dept: HEALTH INFORMATION MANAGEMENT | Facility: CLINIC | Age: 85
End: 2020-07-23

## 2020-07-23 LAB — TSH SERPL-ACNC: 1.91 MU/L (ref 0.4–4)

## 2020-07-28 DIAGNOSIS — R52 GENERALIZED PAIN: ICD-10-CM

## 2020-07-28 RX ORDER — ACETAMINOPHEN 325 MG/1
650 TABLET ORAL 2 TIMES DAILY
Qty: 168 TABLET | Refills: 97 | COMMUNITY
Start: 2020-07-28 | End: 2020-09-17

## 2020-08-04 ENCOUNTER — ASSISTED LIVING VISIT (OUTPATIENT)
Dept: GERIATRICS | Facility: CLINIC | Age: 85
End: 2020-08-04
Payer: MEDICARE

## 2020-08-04 VITALS
SYSTOLIC BLOOD PRESSURE: 117 MMHG | WEIGHT: 95.6 LBS | BODY MASS INDEX: 17.48 KG/M2 | HEART RATE: 71 BPM | DIASTOLIC BLOOD PRESSURE: 82 MMHG | TEMPERATURE: 96.9 F | RESPIRATION RATE: 18 BRPM

## 2020-08-04 DIAGNOSIS — F45.8 BRUXISM (TEETH GRINDING): Primary | ICD-10-CM

## 2020-08-04 DIAGNOSIS — R53.81 DECLINING FUNCTIONAL STATUS: ICD-10-CM

## 2020-08-04 DIAGNOSIS — I10 BENIGN ESSENTIAL HYPERTENSION: ICD-10-CM

## 2020-08-04 DIAGNOSIS — Z86.73 H/O: CVA (CEREBROVASCULAR ACCIDENT): ICD-10-CM

## 2020-08-04 DIAGNOSIS — I48.11 LONGSTANDING PERSISTENT ATRIAL FIBRILLATION (H): ICD-10-CM

## 2020-08-04 NOTE — LETTER
8/4/2020        RE: April Donnelly  [facility]  [address] [facility] [address]        [place] [facility]  [facility] Record Number:  [id number]  Place of Service where encounter took place:  [facility] [[id number]]  Chief Complaint   Patient presents with     RECHECK       HPI:    April Donnelly  is a 89 year old (1/30/1930), who is being seen today for an episodic care visit.  HPI information obtained from: facility chart records, facility staff and [facility] chart review. Today's concern is:    Seen today for follow up to chronic conditions. Staff reporting she is grinding her teeth most of the day and was evident throughout exam toady. Does not appear in pain. Does not want to take pills. They are hiding them in cookies. Did have episode of pedal edema now mostly resolved Sitting in different lower-back WC since her other WC has broken brakes. Tolerating ok. Continues to feed herself at times, less expressive, progressively flat affect. History of pruritus improved with adaptive clothing and gloves/mitts overnight. No longer ambulatory which has lessened fall risk. History of falls and suffered acetabular and hip fracture in 1/2017. She had another fall in the DR here in August 2018, and suffered a proximal right femur fracture for which she underwent a right bipolar hemiarthroplasty. She had PHYSICAL THERAPY, but has not ambulated since then. Transfers with EZ stand and assist of two. Had another fall in May and seen in ED without significant injury. Weight stable but slightly lower from previous visit between 95-98 lbs. No edema. Previously on hospice but graduated. No discomfort with PROM to UE and LE    Past Medical and Surgical History reviewed in Epic today.    MEDICATIONS:    Current Outpatient Medications   Medication Sig Dispense Refill     acetaminophen (TYLENOL) 325 MG tablet Take 2 tablets (650 mg) by mouth 2 times daily And  twice daily as needed 168 tablet 97     CALMOSEPTINE 0.44-20.6 % OINT ointment APPLY TOPICALLY TO COCCYX TWICE DAILY;& WITH ALL INCONTINENT CHANGES FOR SKIN ITCH & BREAKDOWN 113 g 97     Emollient (VANICREAM EX) Apply topically 2 times daily to affected areas and PRN       emollient (VANICREAM) external cream APPLY TOPICALLY TO AFFECTED AREA(S) TWICE DAILY;AND AS NEEDED 453 g 97     levothyroxine (SYNTHROID/LEVOTHROID) 75 MCG tablet TAKE ONE-HALF TABLET (37.5MCG) BY MOUTH ONCE DAILY 14.5 tablet 97     loperamide (IMODIUM A-D) 2 MG tablet Take 2 mg by mouth 2 times daily as needed for diarrhea       metoprolol tartrate (LOPRESSOR) 25 MG tablet Take 0.25 tablets (6.25 mg) by mouth 2 times daily 31 tablet PRN     potassium chloride (KLOR-CON) 20 MEQ packet DISSOLVE ONE PACKET (20MEQ) IN LIQUID AND DRINK BY MOUTH ONCE DAILY 30 packet 97     sennosides (SENOKOT) 8.6 MG tablet Take 1 tablet by mouth three times a week And daily prn 30 tablet 97     REVIEW OF SYSTEMS:  Unobtainable secondary to cognitive impairment.     Objective:  /82   Pulse 71   Temp 96.9  F (36.1  C)   Resp 18   Wt 43.4 kg (95 lb 9.6 oz)   BMI 17.48 kg/m    Exam:  GENERAL APPEARANCE: calm and quiet  ENT: Refused to open mouth today- [place], palpation of jaw no indications of pain, no redness, swelling   EYES:  EOM, conjunctivae normal,  pupils and irises normal, glasses on  RESP:  respiratory effort and palpation of chest normal, lungs grossly clear but diminished   CV:  Palpation and auscultation of heart done , trace edema, grade 2/6 murmur, irregular rhythm, regular rate  ABDOMEN: soft, nontender, bowel sounds present  M/S: passive ROM to UE and LE with no s/s of pain  SKIN: unable to examine much of skin today-staff reports at baseline  NEURO: CLAUDIA  PSYCH:  insight and judgement impaired, memory impaired    Labs:   CBC RESULTS:   Recent Labs   Lab Test 05/26/20 07/25/19   WBC 6.3 6.0   RBC 4.26 4.41   HGB 12.9 13.7   HCT 40.5 40.7   MCV 95  92   MCH 30.3 31.1   MCHC 31.9 33.7   RDW 13.7 12.9    194       Last Basic Metabolic Panel:  Recent Labs   Lab Test 06/04/20 05/28/20 05/26/20     --  138   POTASSIUM 3.9 3.3* 3.2*   CHLORIDE 108  --  106   BOLA 9.0  --  9.0   CO2 25  --  26   BUN 18  --  20   CR 0.64  --  0.61   GLC 95  --  111*       Liver Function Studies -   Recent Labs   Lab Test 07/25/19 04/26/17   PROTTOTAL 6.6* 6.2*   ALBUMIN 3.5 3.1*   BILITOTAL 0.6 0.4   ALKPHOS 109 123   AST 20 22   ALT 14 18       TSH   Date Value Ref Range Status   07/23/2020 1.91 0.40 - 4.00 mU/L Final   05/26/2020 1.82 0.40 - 4.00 mU/L Final       No results found for: A1C    ASSESSMENT/PLAN:  (F45.8) Bruxism (teeth grinding)  (primary encounter diagnosis)  Comment: ongoing- no s/s of discomfort. Continues to feed herself  Plan:   -non-compliant at times will pill taking so will hold off on medication therapy   -did recently reduce Tylenol to BID from TID-questioning increase with pain medication reduction?     (I10) Benign essential hypertension  (I48.11) Longstanding persistent atrial fibrillation (H)  (Z86.73) H/O: CVA (cerebrovascular accident)  Comment: stable  Plan:   -not anticoagulated secondary to falls  -discontinue metoprolol for rate control. HR 67-91  -BMP periodically   -off statin     (R53.81) Declining functional status  Comment: some weight loss, does not always take medications, no longer ambulating   Plan:   -have reviewed hospice in the past with family and if weight loss continues could discuss again             Electronically signed by:  HIGINIO Pena CNP             Sincerely,        HIGINIO Pena CNP

## 2020-08-04 NOTE — PROGRESS NOTES
[facility]  [place] [facility] Record Number:  [id number]  Place of Service where encounter took place:  [facility] [[id number]]  Chief Complaint   Patient presents with     RECHECK       HPI:    April Donnelly  is a 89 year old (1/30/1930), who is being seen today for an episodic care visit.  HPI information obtained from: facility chart records, facility staff and [facility] chart review. Today's concern is:    Seen today for follow up to chronic conditions. Staff reporting she is grinding her teeth most of the day and was evident throughout exam toady. Does not appear in pain. Does not want to take pills. They are hiding them in cookies. Did have episode of pedal edema now mostly resolved Sitting in different lower-back WC since her other WC has broken brakes. Tolerating ok. Continues to feed herself at times, less expressive, progressively flat affect. History of pruritus improved with adaptive clothing and gloves/mitts overnight. No longer ambulatory which has lessened fall risk. History of falls and suffered acetabular and hip fracture in 1/2017. She had another fall in the DR here in August 2018, and suffered a proximal right femur fracture for which she underwent a right bipolar hemiarthroplasty. She had PHYSICAL THERAPY, but has not ambulated since then. Transfers with EZ stand and assist of two. Had another fall in May and seen in ED without significant injury. Weight stable but slightly lower from previous visit between 95-98 lbs. No edema. Previously on hospice but graduated. No discomfort with PROM to UE and LE    Past Medical and Surgical History reviewed in Epic today.    MEDICATIONS:    Current Outpatient Medications   Medication Sig Dispense Refill     acetaminophen (TYLENOL) 325 MG tablet Take 2 tablets (650 mg) by mouth 2 times daily And twice daily as needed 168 tablet 97     CALMOSEPTINE 0.44-20.6 % OINT ointment APPLY TOPICALLY TO COCCYX TWICE DAILY;&  WITH ALL INCONTINENT CHANGES FOR SKIN ITCH & BREAKDOWN 113 g 97     Emollient (VANICREAM EX) Apply topically 2 times daily to affected areas and PRN       emollient (VANICREAM) external cream APPLY TOPICALLY TO AFFECTED AREA(S) TWICE DAILY;AND AS NEEDED 453 g 97     levothyroxine (SYNTHROID/LEVOTHROID) 75 MCG tablet TAKE ONE-HALF TABLET (37.5MCG) BY MOUTH ONCE DAILY 14.5 tablet 97     loperamide (IMODIUM A-D) 2 MG tablet Take 2 mg by mouth 2 times daily as needed for diarrhea       metoprolol tartrate (LOPRESSOR) 25 MG tablet Take 0.25 tablets (6.25 mg) by mouth 2 times daily 31 tablet PRN     potassium chloride (KLOR-CON) 20 MEQ packet DISSOLVE ONE PACKET (20MEQ) IN LIQUID AND DRINK BY MOUTH ONCE DAILY 30 packet 97     sennosides (SENOKOT) 8.6 MG tablet Take 1 tablet by mouth three times a week And daily prn 30 tablet 97     REVIEW OF SYSTEMS:  Unobtainable secondary to cognitive impairment.     Objective:  /82   Pulse 71   Temp 96.9  F (36.1  C)   Resp 18   Wt 43.4 kg (95 lb 9.6 oz)   BMI 17.48 kg/m    Exam:  GENERAL APPEARANCE: calm and quiet  ENT: Refused to open mouth today- [place], palpation of jaw no indications of pain, no redness, swelling   EYES:  EOM, conjunctivae normal,  pupils and irises normal, glasses on  RESP:  respiratory effort and palpation of chest normal, lungs grossly clear but diminished   CV:  Palpation and auscultation of heart done , trace edema, grade 2/6 murmur, irregular rhythm, regular rate  ABDOMEN: soft, nontender, bowel sounds present  M/S: passive ROM to UE and LE with no s/s of pain  SKIN: unable to examine much of skin today-staff reports at baseline  NEURO: CLAUDIA  PSYCH:  insight and judgement impaired, memory impaired    Labs:   CBC RESULTS:   Recent Labs   Lab Test 05/26/20 07/25/19   WBC 6.3 6.0   RBC 4.26 4.41   HGB 12.9 13.7   HCT 40.5 40.7   MCV 95 92   MCH 30.3 31.1   MCHC 31.9 33.7   RDW 13.7 12.9    194       Last Basic Metabolic Panel:  Recent Labs   Lab  Test 06/04/20 05/28/20 05/26/20     --  138   POTASSIUM 3.9 3.3* 3.2*   CHLORIDE 108  --  106   BOLA 9.0  --  9.0   CO2 25  --  26   BUN 18  --  20   CR 0.64  --  0.61   GLC 95  --  111*       Liver Function Studies -   Recent Labs   Lab Test 07/25/19 04/26/17   PROTTOTAL 6.6* 6.2*   ALBUMIN 3.5 3.1*   BILITOTAL 0.6 0.4   ALKPHOS 109 123   AST 20 22   ALT 14 18       TSH   Date Value Ref Range Status   07/23/2020 1.91 0.40 - 4.00 mU/L Final   05/26/2020 1.82 0.40 - 4.00 mU/L Final       No results found for: A1C    ASSESSMENT/PLAN:  (F45.8) Bruxism (teeth grinding)  (primary encounter diagnosis)  Comment: ongoing- no s/s of discomfort. Continues to feed herself  Plan:   -non-compliant at times will pill taking so will hold off on medication therapy   -did recently reduce Tylenol to BID from TID-questioning increase with pain medication reduction?     (I10) Benign essential hypertension  (I48.11) Longstanding persistent atrial fibrillation (H)  (Z86.73) H/O: CVA (cerebrovascular accident)  Comment: stable  Plan:   -not anticoagulated secondary to falls  -discontinue metoprolol for rate control. HR 67-91  -BMP periodically   -off statin     (R53.81) Declining functional status  Comment: some weight loss, does not always take medications, no longer ambulating   Plan:   -have reviewed hospice in the past with family and if weight loss continues could discuss again             Electronically signed by:  HIGINIO Pena CNP

## 2020-09-17 DIAGNOSIS — R52 GENERALIZED PAIN: ICD-10-CM

## 2020-09-17 RX ORDER — ACETAMINOPHEN 325 MG/1
TABLET ORAL
Qty: 112 TABLET | Refills: 97 | Status: SHIPPED | OUTPATIENT
Start: 2020-09-17 | End: 2021-01-01

## 2020-10-13 ENCOUNTER — ASSISTED LIVING VISIT (OUTPATIENT)
Dept: GERIATRICS | Facility: CLINIC | Age: 85
End: 2020-10-13
Payer: MEDICARE

## 2020-10-13 VITALS
SYSTOLIC BLOOD PRESSURE: 138 MMHG | WEIGHT: 91.8 LBS | RESPIRATION RATE: 17 BRPM | OXYGEN SATURATION: 96 % | HEART RATE: 68 BPM | DIASTOLIC BLOOD PRESSURE: 80 MMHG | TEMPERATURE: 96.8 F | BODY MASS INDEX: 16.79 KG/M2

## 2020-10-13 DIAGNOSIS — I69.359 HEMIPARESIS FOLLOWING CEREBROVASCULAR ACCIDENT (CVA) (H): ICD-10-CM

## 2020-10-13 DIAGNOSIS — E03.9 ACQUIRED HYPOTHYROIDISM: ICD-10-CM

## 2020-10-13 DIAGNOSIS — R63.4 WEIGHT LOSS: ICD-10-CM

## 2020-10-13 DIAGNOSIS — L29.9 PRURITIC DISORDER: ICD-10-CM

## 2020-10-13 DIAGNOSIS — I48.0 PAF (PAROXYSMAL ATRIAL FIBRILLATION) (H): ICD-10-CM

## 2020-10-13 DIAGNOSIS — Z86.73 H/O: CVA (CEREBROVASCULAR ACCIDENT): ICD-10-CM

## 2020-10-13 DIAGNOSIS — I10 BENIGN ESSENTIAL HYPERTENSION: Primary | ICD-10-CM

## 2020-10-13 DIAGNOSIS — R53.81 DECLINING FUNCTIONAL STATUS: ICD-10-CM

## 2020-10-13 NOTE — LETTER
"    10/13/2020        RE: April Donnelly  [facility]  [address] [facility] [address]        April Donnelly is a 89 year old female seen October 13, 2020 at [facility] unit where she has resided for 4 years (admit 11/2016) seen to follow up HTN, dementia  Patient is seen on the unit up to high-backed WC.  Looks around, occ makes eye contact.  Audibly grinding her teeth, no verbal response.    AL nursing staff reports pt does not take pills; they are crushing them and hiding them in her food.     Patient has had multiple falls, and suffered acetabular and hip fracture in 2017.  She had another fall in the DR here in August 2018, and suffered a proximal right femur fracture for which she underwent a right bipolar hemiarthroplasty.  She has not ambulated since then.    Transfers with EZ stand and assist of two.   Had another fall in May 2019 and seen in ED, without significant injury.  She also had atrial fib recognized during hospitalization, with unremarkable ECHO.   She is not anticoagulated secondary to h/o hemorrhagic stroke and multiple falls.   Goals of care are comfort focus; she has \"graduated\" from Hospice.       Past Medical History:   Diagnosis Date     Adjustment disorder with mixed anxiety and depressed mood 12/2/2016     Adjustment disorder with mixed anxiety and depressed mood 12/2/2016     Cerebrovascular accident (H) 12/2/2016     CVA (cerebral vascular accident) (H)      Hemorrhagic stroke (H) 12/2/2016     Hypertensive heart disease without heart failure 12/2/2016     Severe dementia 12/2/2016     Syncope 12/2/2016     Thyroid disease     Atrial fibrillation   Multiple falls  Acetabular hip fracture, 2017  Right proximal femur fracture, 2018      SH:   .      She has 3 sons Woody [place] and Reji.       ROS:    Wt Readings from Last 5 Encounters:   10/13/20 41.6 kg (91 lb 12.8 oz)   08/04/20 43.4 kg (95 lb 9.6 oz)   05/28/20 43.3 kg (95 lb 6.4 oz) "   04/21/20 44.8 kg (98 lb 12.8 oz)   01/28/20 44.5 kg (98 lb)      EXAM: NAD, frail  /80   Pulse 68   Temp 96.8  F (36  C)   Resp 17   Wt 41.6 kg (91 lb 12.8 oz)   SpO2 96%   BMI 16.79 kg/m     Neck supple without adenopathy  Lungs with decreased BS, no rales or wheeze  Heart RRR s1s2, no ectopy or murmur  Abd soft, NT, no distention, +BS  Ext without edema  Neuro: limited verbal skills, no tremor or stiffness, WC bound, +bruxism  Psych: affect very flat    Last Comprehensive Metabolic Panel:  Sodium   Date Value Ref Range Status   06/04/2020 139 133 - 144 mmol/L Final     Potassium   Date Value Ref Range Status   06/04/2020 3.9 3.4 - 5.3 mmol/L Final     Chloride   Date Value Ref Range Status   06/04/2020 108 94 - 109 mmol/L Final     Carbon Dioxide   Date Value Ref Range Status   06/04/2020 25 20 - 32 mmol/L Final     Anion Gap   Date Value Ref Range Status   06/04/2020 6 3 - 14 mmol/L Final     Glucose   Date Value Ref Range Status   06/04/2020 95 70 - 99 mg/dL Final     Urea Nitrogen   Date Value Ref Range Status   06/04/2020 18 7 - 30 mg/dL Final     Creatinine   Date Value Ref Range Status   06/04/2020 0.64 0.52 - 1.04 mg/dL Final     GFR Estimate   Date Value Ref Range Status   06/04/2020 78 >60 ml/min/1.73_m2 Final     Calcium   Date Value Ref Range Status   06/04/2020 9.0 8.5 - 10.1 mg/dL Final     Head CT w/o contrast 5/11/19:  1. No intracranial hemorrhage or skull fractures.  2. Small low dense lesion in the right parietal region. This involves the cortex and subcortical white matter. There are small associated  calcifications. This is probably due to a chronic infarct but comparison to any old scans or old CT reports would be helpful to  confirm that this has remained stable.  3. There is diffuse parenchymal volume loss.  White matter changes are present in the cerebral hemispheres that are consistent with small  vessel ischemic disease in this age patient.    IMP/PLAN:   (I48.0) Paroxysmal  atrial fibrillation (H)    Comment: not requiring rate slowing medications.     Pulse Readings from Last 4 Encounters:   10/13/20 68   08/04/20 71   05/28/20 73   04/21/20 74      Plan: Not anticoagulated secondary to h/o hemorrhagic stroke and multiple falls with injury.      (Z86.73) H/O: CVA (cerebrovascular accident)  (I69.359) Hemiparesis following cerebrovascular accident (CVA) (H)  Comment: limited mobility     Plan: assist with transfers and all mobility   No preventative tx given goals of care.       (I10) Benign essential hypertension  Comment:   BP Readings from Last 3 Encounters:   10/13/20 138/80   08/04/20 117/82   05/28/20 (!) 113/90     Plan: no longer on anti-hypertensives    (L29.9) Pruritic disorder  Comment: scratching behaviors, improved   Plan: local moisturizing creams, cotton gloves at night.       (F01.50) Vascular dementia without behavioral disturbance  (R63.4) Weight loss  (R53.81) Declining functional status  Comment: with loss of language and mobility  Plan: [facility] unit for meds, meals, activity, safety    With further weight loss she may qualify for Hospice again     (E03.9) Acquired hypothyroidism  Comment:   TSH   Date Value Ref Range Status   07/23/2020 1.91 0.40 - 4.00 mU/L Final    Plan: levothyroxine 75 mcg/day; yearly TSH     AD: DNR/DNI, comfort focus; Hospice re-evaluation if she has a downturn.         Teresa Muse MD           Sincerely,        Teresa Muse MD

## 2020-10-17 NOTE — PROGRESS NOTES
"April Donnelly is a 89 year old female seen October 13, 2020 at [facility] Memory Care unit where she has resided for 4 years (admit 11/2016) seen to follow up HTN, dementia  Patient is seen on the unit up to high-backed WC.  Looks around, occ makes eye contact.  Audibly grinding her teeth, no verbal response.    AL nursing staff reports pt does not take pills; they are crushing them and hiding them in her food.     Patient has had multiple falls, and suffered acetabular and hip fracture in 2017.  She had another fall in the DR here in August 2018, and suffered a proximal right femur fracture for which she underwent a right bipolar hemiarthroplasty.  She has not ambulated since then.    Transfers with EZ stand and assist of two.   Had another fall in May 2019 and seen in ED, without significant injury.  She also had atrial fib recognized during hospitalization, with unremarkable ECHO.   She is not anticoagulated secondary to h/o hemorrhagic stroke and multiple falls.   Goals of care are comfort focus; she has \"graduated\" from Hospice.       Past Medical History:   Diagnosis Date     Adjustment disorder with mixed anxiety and depressed mood 12/2/2016     Adjustment disorder with mixed anxiety and depressed mood 12/2/2016     Cerebrovascular accident (H) 12/2/2016     CVA (cerebral vascular accident) (H)      Hemorrhagic stroke (H) 12/2/2016     Hypertensive heart disease without heart failure 12/2/2016     Severe dementia 12/2/2016     Syncope 12/2/2016     Thyroid disease     Atrial fibrillation   Multiple falls  Acetabular hip fracture, 2017  Right proximal femur fracture, 2018      SH:   .      She has 3 sons Che, Woody and Reji.       ROS:    Wt Readings from Last 5 Encounters:   10/13/20 41.6 kg (91 lb 12.8 oz)   08/04/20 43.4 kg (95 lb 9.6 oz)   05/28/20 43.3 kg (95 lb 6.4 oz)   04/21/20 44.8 kg (98 lb 12.8 oz)   01/28/20 44.5 kg (98 lb)      EXAM: NAD, frail  /80   Pulse 68   Temp 96.8  F (36 "  C)   Resp 17   Wt 41.6 kg (91 lb 12.8 oz)   SpO2 96%   BMI 16.79 kg/m     Neck supple without adenopathy  Lungs with decreased BS, no rales or wheeze  Heart RRR s1s2, no ectopy or murmur  Abd soft, NT, no distention, +BS  Ext without edema  Neuro: limited verbal skills, no tremor or stiffness, WC bound, +bruxism  Psych: affect very flat    Last Comprehensive Metabolic Panel:  Sodium   Date Value Ref Range Status   06/04/2020 139 133 - 144 mmol/L Final     Potassium   Date Value Ref Range Status   06/04/2020 3.9 3.4 - 5.3 mmol/L Final     Chloride   Date Value Ref Range Status   06/04/2020 108 94 - 109 mmol/L Final     Carbon Dioxide   Date Value Ref Range Status   06/04/2020 25 20 - 32 mmol/L Final     Anion Gap   Date Value Ref Range Status   06/04/2020 6 3 - 14 mmol/L Final     Glucose   Date Value Ref Range Status   06/04/2020 95 70 - 99 mg/dL Final     Urea Nitrogen   Date Value Ref Range Status   06/04/2020 18 7 - 30 mg/dL Final     Creatinine   Date Value Ref Range Status   06/04/2020 0.64 0.52 - 1.04 mg/dL Final     GFR Estimate   Date Value Ref Range Status   06/04/2020 78 >60 ml/min/1.73_m2 Final     Calcium   Date Value Ref Range Status   06/04/2020 9.0 8.5 - 10.1 mg/dL Final     Head CT w/o contrast 5/11/19:  1. No intracranial hemorrhage or skull fractures.  2. Small low dense lesion in the right parietal region. This involves the cortex and subcortical white matter. There are small associated  calcifications. This is probably due to a chronic infarct but comparison to any old scans or old CT reports would be helpful to  confirm that this has remained stable.  3. There is diffuse parenchymal volume loss.  White matter changes are present in the cerebral hemispheres that are consistent with small  vessel ischemic disease in this age patient.    IMP/PLAN:   (I48.0) Paroxysmal atrial fibrillation (H)    Comment: not requiring rate slowing medications.     Pulse Readings from Last 4 Encounters:    10/13/20 68   08/04/20 71   05/28/20 73   04/21/20 74      Plan: Not anticoagulated secondary to h/o hemorrhagic stroke and multiple falls with injury.      (Z86.73) H/O: CVA (cerebrovascular accident)  (I69.359) Hemiparesis following cerebrovascular accident (CVA) (H)  Comment: limited mobility     Plan: assist with transfers and all mobility   No preventative tx given goals of care.       (I10) Benign essential hypertension  Comment:   BP Readings from Last 3 Encounters:   10/13/20 138/80   08/04/20 117/82   05/28/20 (!) 113/90     Plan: no longer on anti-hypertensives    (L29.9) Pruritic disorder  Comment: scratching behaviors, improved   Plan: local moisturizing creams, cotton gloves at night.       (F01.50) Vascular dementia without behavioral disturbance  (R63.4) Weight loss  (R53.81) Declining functional status  Comment: with loss of language and mobility  Plan: [facility] unit for meds, meals, activity, safety    With further weight loss she may qualify for Hospice again     (E03.9) Acquired hypothyroidism  Comment:   TSH   Date Value Ref Range Status   07/23/2020 1.91 0.40 - 4.00 mU/L Final    Plan: levothyroxine 75 mcg/day; yearly TSH     AD: DNR/DNI, comfort focus; Hospice re-evaluation if she has a downturn.         Teresa Muse MD

## 2020-11-02 NOTE — PROGRESS NOTES
[place] [facility]  [place] Medical Record Number: [id number]  Place of Service where encounter took place: [facility] [[id number]]  Chief Complaint   Patient presents with     Nursing Home Acute       HPI:    April Donnelly is a 89 year old (1/30/1930), who is being seen today for an episodic care visit. HPI information obtained from: facility chart records, facility staff and [facility] chart review. Today's concern is:    Seen today for 2 day history of dry cough and congestion. Febrile at 99.1 on 11/3. Appears flush but no longer with temp. COVID-19 pending and last test was negative 10/27. Unable to swab for influenza as facility has no swabs onsite (unable to order stock). Eating and drinking at baseline. Appears frail. With advanced dementia unable to give much history of illness. Others on her unit with similar presentation.     Past Medical and Surgical History reviewed in Epic today.    MEDICATIONS:    Current Outpatient Medications   Medication Sig Dispense Refill     dextromethorphan (DELSYM) 30 MG/5ML liquid Take 5 mLs (30 mg) by mouth 2 times daily And BID PRN       acetaminophen (TYLENOL) 325 MG tablet TAKE TWO TABLETS (650MG) BY MOUTH TWICE DAILY;AND MAY TAKE TWO TABLETS (650MG) BY MOUTH TWICE DAILY AS NEEDED 112 tablet 97     CALMOSEPTINE 0.44-20.6 % OINT ointment APPLY TOPICALLY TO COCCYX TWICE DAILY;& WITH ALL INCONTINENT CHANGES FOR SKIN ITCH & BREAKDOWN 113 g 97     Emollient (VANICREAM EX) Apply topically 2 times daily to affected areas and PRN       levothyroxine (SYNTHROID/LEVOTHROID) 75 MCG tablet TAKE ONE-HALF TABLET (37.5MCG) BY MOUTH ONCE DAILY 14.5 tablet 97     loperamide (IMODIUM A-D) 2 MG tablet Take 2 mg by mouth 2 times daily as needed for diarrhea       potassium chloride (KLOR-CON) 20 MEQ packet DISSOLVE ONE PACKET (20MEQ) IN LIQUID AND DRINK BY MOUTH ONCE DAILY 30 packet 97     sennosides (SENOKOT) 8.6 MG tablet Take 1 tablet by mouth three  "times a week And daily prn 30 tablet 97     REVIEW OF SYSTEMS:  Unobtainable secondary to cognitive impairment.  and Unobtainable secondary to aphasia.     Objective:  /69   Pulse 70   Temp 97.7  F (36.5  C)   Resp 16   Ht 1.575 m (5' 2\")   Wt 44 kg (97 lb)   SpO2 96%   BMI 17.74 kg/m    Exam:  GENERAL APPEARANCE: calm and quiet, grinding teeth  ENT: Refused to open mouth today- [place], palpation of jaw no indications of pain, no redness, swelling   EYES:  EOM, conjunctivae normal,  pupils and irises normal  RESP: respiratory effort and palpation of chest- lungs diminished and faint    CV:  Palpation and auscultation of heart done , trace edema, grade 2/6 murmur, irregular rhythm, regular rate  ABDOMEN: soft, nontender, bowel sounds present  M/S: passive ROM to UE and LE with no s/s of pain  SKIN: unable to examine much of skin today-staff reports at baseline  NEURO: CLAUDIA  PSYCH:  insight and judgement impaired, memory impaired    Labs:   CBC RESULTS:   Recent Labs   Lab Test 05/26/20 07/25/19   WBC 6.3 6.0   RBC 4.26 4.41   HGB 12.9 13.7   HCT 40.5 40.7   MCV 95 92   MCH 30.3 31.1   MCHC 31.9 33.7   RDW 13.7 12.9    194       Last Basic Metabolic Panel:  Recent Labs   Lab Test 06/04/20 05/28/20 05/26/20     --  138   POTASSIUM 3.9 3.3* 3.2*   CHLORIDE 108  --  106   BOLA 9.0  --  9.0   CO2 25  --  26   BUN 18  --  20   CR 0.64  --  0.61   GLC 95  --  111*       Liver Function Studies -   Recent Labs   Lab Test 07/25/19 04/26/17   PROTTOTAL 6.6* 6.2*   ALBUMIN 3.5 3.1*   BILITOTAL 0.6 0.4   ALKPHOS 109 123   AST 20 22   ALT 14 18       TSH   Date Value Ref Range Status   07/23/2020 1.91 0.40 - 4.00 mU/L Final   05/26/2020 1.82 0.40 - 4.00 mU/L Final       No results found for: A1C    ASSESSMENT/PLAN:  (R05) Cough  (primary encounter diagnosis)  (R53.81) Malaise  Comment: refuses medications at times   Plan:   -dextromethorphan (DELSYM) BID x 7 days  -scheduled and prn Tylenol   -encourage oral " intake of fluids to prevent dehydration                   Electronically signed by:  HIGINIO Pena CNP

## 2020-11-03 NOTE — LETTER
11/3/2020        RE: April Donnelly  C/o Handt Andrzej  [address] [address]        [place] [facility]  [place] [facility] Record Number: [id number]  Place of Service where encounter took place: [facility] [[id number]]  Chief Complaint   Patient presents with     Nursing Home Acute       HPI:    April Donnelly is a 89 year old (1/30/1930), who is being seen today for an episodic care visit. HPI information obtained from: facility chart records, facility staff and [facility] chart review. Today's concern is:    Seen today for 2 day history of dry cough and congestion. Febrile at 99.1 on 11/3. Appears flush but no longer with temp. COVID-19 pending and last test was negative 10/27. Unable to swab for influenza as facility has no swabs onsite (unable to order stock). Eating and drinking at baseline. Appears frail. With advanced dementia unable to give much history of illness. Others on her unit with similar presentation.     Past Medical and Surgical History reviewed in Epic today.    MEDICATIONS:    Current Outpatient Medications   Medication Sig Dispense Refill     dextromethorphan (DELSYM) 30 MG/5ML liquid Take 5 mLs (30 mg) by mouth 2 times daily And BID PRN       acetaminophen (TYLENOL) 325 MG tablet TAKE TWO TABLETS (650MG) BY MOUTH TWICE DAILY;AND MAY TAKE TWO TABLETS (650MG) BY MOUTH TWICE DAILY AS NEEDED 112 tablet 97     CALMOSEPTINE 0.44-20.6 % OINT ointment APPLY TOPICALLY TO COCCYX TWICE DAILY;& WITH ALL INCONTINENT CHANGES FOR SKIN ITCH & BREAKDOWN 113 g 97     Emollient (VANICREAM EX) Apply topically 2 times daily to affected areas and PRN       levothyroxine (SYNTHROID/LEVOTHROID) 75 MCG tablet TAKE ONE-HALF TABLET (37.5MCG) BY MOUTH ONCE DAILY 14.5 tablet 97     loperamide (IMODIUM A-D) 2 MG tablet Take 2 mg by mouth 2 times daily as needed for diarrhea       potassium chloride (KLOR-CON) 20 MEQ packet DISSOLVE ONE PACKET (20MEQ) IN LIQUID  "AND DRINK BY MOUTH ONCE DAILY 30 packet 97     sennosides (SENOKOT) 8.6 MG tablet Take 1 tablet by mouth three times a week And daily prn 30 tablet 97     REVIEW OF SYSTEMS:  Unobtainable secondary to cognitive impairment.  and Unobtainable secondary to aphasia.     Objective:  /69   Pulse 70   Temp 97.7  F (36.5  C)   Resp 16   Ht 1.575 m (5' 2\")   Wt 44 kg (97 lb)   SpO2 96%   BMI 17.74 kg/m    Exam:  GENERAL APPEARANCE: calm and quiet, grinding teeth  ENT: Refused to open mouth today- [place], palpation of jaw no indications of pain, no redness, swelling   EYES:  EOM, conjunctivae normal,  pupils and irises normal  RESP: respiratory effort and palpation of chest- lungs diminished and faint    CV:  Palpation and auscultation of heart done , trace edema, grade 2/6 murmur, irregular rhythm, regular rate  ABDOMEN: soft, nontender, bowel sounds present  M/S: passive ROM to UE and LE with no s/s of pain  SKIN: unable to examine much of skin today-staff reports at baseline  NEURO: CLAUDIA  PSYCH:  insight and judgement impaired, memory impaired    Labs:   CBC RESULTS:   Recent Labs   Lab Test 05/26/20 07/25/19   WBC 6.3 6.0   RBC 4.26 4.41   HGB 12.9 13.7   HCT 40.5 40.7   MCV 95 92   MCH 30.3 31.1   MCHC 31.9 33.7   RDW 13.7 12.9    194       Last Basic Metabolic Panel:  Recent Labs   Lab Test 06/04/20 05/28/20 05/26/20     --  138   POTASSIUM 3.9 3.3* 3.2*   CHLORIDE 108  --  106   BOLA 9.0  --  9.0   CO2 25  --  26   BUN 18  --  20   CR 0.64  --  0.61   GLC 95  --  111*       Liver Function Studies -   Recent Labs   Lab Test 07/25/19 04/26/17   PROTTOTAL 6.6* 6.2*   ALBUMIN 3.5 3.1*   BILITOTAL 0.6 0.4   ALKPHOS 109 123   AST 20 22   ALT 14 18       TSH   Date Value Ref Range Status   07/23/2020 1.91 0.40 - 4.00 mU/L Final   05/26/2020 1.82 0.40 - 4.00 mU/L Final       No results found for: A1C    ASSESSMENT/PLAN:  (R05) Cough  (primary encounter diagnosis)  (R53.81) Malaise  Comment: refuses " medications at times   Plan:   -dextromethorphan (DELSYM) BID x 7 days  -scheduled and prn Tylenol   -encourage oral intake of fluids to prevent dehydration                   Electronically signed by:  HIGINIO Pena CNP         Sincerely,        HIGINIO ePna CNP

## 2021-01-01 ENCOUNTER — TRANSFERRED RECORDS (OUTPATIENT)
Dept: HEALTH INFORMATION MANAGEMENT | Facility: CLINIC | Age: 86
End: 2021-01-01

## 2021-01-01 ENCOUNTER — ASSISTED LIVING VISIT (OUTPATIENT)
Dept: GERIATRICS | Facility: CLINIC | Age: 86
End: 2021-01-01
Payer: MEDICARE

## 2021-01-01 ENCOUNTER — MEDICAL CORRESPONDENCE (OUTPATIENT)
Dept: HEALTH INFORMATION MANAGEMENT | Facility: CLINIC | Age: 86
End: 2021-01-01
Payer: MEDICARE

## 2021-01-01 VITALS
OXYGEN SATURATION: 96 % | DIASTOLIC BLOOD PRESSURE: 63 MMHG | HEART RATE: 78 BPM | WEIGHT: 91.6 LBS | HEIGHT: 62 IN | BODY MASS INDEX: 16.86 KG/M2 | TEMPERATURE: 96.9 F | RESPIRATION RATE: 12 BRPM | SYSTOLIC BLOOD PRESSURE: 116 MMHG

## 2021-01-01 VITALS
BODY MASS INDEX: 17.66 KG/M2 | HEIGHT: 62 IN | TEMPERATURE: 97.9 F | RESPIRATION RATE: 17 BRPM | OXYGEN SATURATION: 96 % | SYSTOLIC BLOOD PRESSURE: 141 MMHG | DIASTOLIC BLOOD PRESSURE: 80 MMHG | WEIGHT: 96 LBS | HEART RATE: 85 BPM

## 2021-01-01 VITALS
DIASTOLIC BLOOD PRESSURE: 67 MMHG | BODY MASS INDEX: 16.97 KG/M2 | SYSTOLIC BLOOD PRESSURE: 120 MMHG | HEART RATE: 84 BPM | RESPIRATION RATE: 15 BRPM | HEIGHT: 62 IN | WEIGHT: 92.2 LBS | TEMPERATURE: 96.6 F | OXYGEN SATURATION: 96 %

## 2021-01-01 VITALS
DIASTOLIC BLOOD PRESSURE: 70 MMHG | HEIGHT: 62 IN | TEMPERATURE: 97.9 F | SYSTOLIC BLOOD PRESSURE: 115 MMHG | RESPIRATION RATE: 16 BRPM | OXYGEN SATURATION: 98 % | HEART RATE: 80 BPM | BODY MASS INDEX: 18.4 KG/M2 | WEIGHT: 100 LBS

## 2021-01-01 VITALS
DIASTOLIC BLOOD PRESSURE: 79 MMHG | RESPIRATION RATE: 18 BRPM | SYSTOLIC BLOOD PRESSURE: 149 MMHG | HEART RATE: 49 BPM | TEMPERATURE: 97.8 F | OXYGEN SATURATION: 98 % | WEIGHT: 90.8 LBS | HEIGHT: 62 IN | BODY MASS INDEX: 16.71 KG/M2

## 2021-01-01 VITALS
OXYGEN SATURATION: 98 % | WEIGHT: 84.6 LBS | TEMPERATURE: 97.8 F | BODY MASS INDEX: 15.57 KG/M2 | DIASTOLIC BLOOD PRESSURE: 84 MMHG | SYSTOLIC BLOOD PRESSURE: 132 MMHG | HEART RATE: 99 BPM | HEIGHT: 62 IN | RESPIRATION RATE: 20 BRPM

## 2021-01-01 VITALS
OXYGEN SATURATION: 100 % | BODY MASS INDEX: 16.8 KG/M2 | HEIGHT: 62 IN | DIASTOLIC BLOOD PRESSURE: 69 MMHG | TEMPERATURE: 97.9 F | RESPIRATION RATE: 19 BRPM | SYSTOLIC BLOOD PRESSURE: 115 MMHG | WEIGHT: 91.3 LBS | HEART RATE: 78 BPM

## 2021-01-01 VITALS
RESPIRATION RATE: 15 BRPM | WEIGHT: 91.6 LBS | TEMPERATURE: 97.7 F | OXYGEN SATURATION: 96 % | BODY MASS INDEX: 16.86 KG/M2 | HEIGHT: 62 IN | SYSTOLIC BLOOD PRESSURE: 139 MMHG | DIASTOLIC BLOOD PRESSURE: 79 MMHG | HEART RATE: 76 BPM

## 2021-01-01 DIAGNOSIS — Z86.73 H/O: CVA (CEREBROVASCULAR ACCIDENT): ICD-10-CM

## 2021-01-01 DIAGNOSIS — I48.0 PAROXYSMAL ATRIAL FIBRILLATION (H): ICD-10-CM

## 2021-01-01 DIAGNOSIS — E03.9 ACQUIRED HYPOTHYROIDISM: ICD-10-CM

## 2021-01-01 DIAGNOSIS — Z51.5 HOSPICE CARE PATIENT: ICD-10-CM

## 2021-01-01 DIAGNOSIS — F01.50 VASCULAR DEMENTIA WITHOUT BEHAVIORAL DISTURBANCE (H): Primary | ICD-10-CM

## 2021-01-01 DIAGNOSIS — E03.8 OTHER SPECIFIED HYPOTHYROIDISM: ICD-10-CM

## 2021-01-01 DIAGNOSIS — E87.6 HYPOKALEMIA: ICD-10-CM

## 2021-01-01 DIAGNOSIS — S91.109D NON-HEALING OPEN WOUND OF TOE, SUBSEQUENT ENCOUNTER: Primary | ICD-10-CM

## 2021-01-01 DIAGNOSIS — I10 BENIGN ESSENTIAL HYPERTENSION: ICD-10-CM

## 2021-01-01 DIAGNOSIS — F01.50 VASCULAR DEMENTIA WITHOUT BEHAVIORAL DISTURBANCE (H): ICD-10-CM

## 2021-01-01 DIAGNOSIS — R60.0 PEDAL EDEMA: ICD-10-CM

## 2021-01-01 DIAGNOSIS — S91.109A NON-HEALING OPEN WOUND OF TOE, INITIAL ENCOUNTER: Primary | ICD-10-CM

## 2021-01-01 DIAGNOSIS — I48.0 PAF (PAROXYSMAL ATRIAL FIBRILLATION) (H): ICD-10-CM

## 2021-01-01 DIAGNOSIS — L85.3 DRY SKIN: Primary | ICD-10-CM

## 2021-01-01 DIAGNOSIS — S91.109D NON-HEALING OPEN WOUND OF TOE, SUBSEQUENT ENCOUNTER: ICD-10-CM

## 2021-01-01 DIAGNOSIS — R52 GENERALIZED PAIN: ICD-10-CM

## 2021-01-01 DIAGNOSIS — E46 PROTEIN-CALORIE MALNUTRITION, UNSPECIFIED SEVERITY (H): ICD-10-CM

## 2021-01-01 DIAGNOSIS — R21 RASH AND NONSPECIFIC SKIN ERUPTION: ICD-10-CM

## 2021-01-01 DIAGNOSIS — I73.9 PVD (PERIPHERAL VASCULAR DISEASE) (H): Primary | ICD-10-CM

## 2021-01-01 DIAGNOSIS — R21 RASH: ICD-10-CM

## 2021-01-01 DIAGNOSIS — I96 DRY GANGRENE (H): ICD-10-CM

## 2021-01-01 DIAGNOSIS — L85.3 DRY SKIN: ICD-10-CM

## 2021-01-01 DIAGNOSIS — R53.81 DECLINING FUNCTIONAL STATUS: ICD-10-CM

## 2021-01-01 DIAGNOSIS — R45.1 AGITATION: Primary | ICD-10-CM

## 2021-01-01 LAB
ALBUMIN SERPL-MCNC: 3.6 G/DL (ref 3.4–5)
ALP SERPL-CCNC: 132 U/L (ref 40–150)
ALT SERPL-CCNC: 16 U/L (ref 0–50)
ANION GAP SERPL CALCULATED.3IONS-SCNC: 7 MMOL/L (ref 3–14)
AST SERPL-CCNC: 22 U/L (ref 0–45)
BILIRUB SERPL-MCNC: 0.4 MG/DL (ref 0.2–1.3)
BILIRUBIN DIRECT: <0.1 MG/DL (ref 0–0.2)
BUN SERPL-MCNC: 17 MG/DL (ref 7–30)
CALCIUM SERPL-MCNC: 9.4 MG/DL (ref 8.5–10.1)
CHLORIDE SERPLBLD-SCNC: 107 MMOL/L (ref 94–109)
CO2 SERPL-SCNC: 27 MMOL/L (ref 20–32)
CREAT SERPL-MCNC: 0.81 MG/DL (ref 0.52–1.04)
DIFFERENTIAL: NORMAL
ERYTHROCYTE [DISTWIDTH] IN BLOOD BY AUTOMATED COUNT: 12.7 % (ref 10–15)
GFR SERPL CREATININE-BSD FRML MDRD: 63 ML/MIN/1.73M2
GLUCOSE SERPL-MCNC: 73 MG/DL (ref 70–99)
HCT VFR BLD AUTO: 43.6 % (ref 35–47)
HEMOGLOBIN: 13.9 G/DL (ref 11.7–15.7)
MCH RBC QN AUTO: 31.1 PG (ref 26.5–33)
MCHC RBC AUTO-ENTMCNC: 31.9 G/DL (ref 31.5–36.5)
MCV RBC AUTO: 98 FL (ref 78–100)
PLATELET # BLD AUTO: 272 10^9/L (ref 150–450)
POTASSIUM SERPL-SCNC: 3.7 MMOL/L (ref 3.4–5.3)
PROT SERPL-MCNC: 7 G/DL (ref 6.8–8.8)
RBC # BLD AUTO: 4.47 10^12/L (ref 3.8–5.2)
SODIUM SERPL-SCNC: 141 MMOL/L (ref 133–144)
TSH SERPL-ACNC: 3.87 MU/L (ref 0.4–4)
WBC # BLD AUTO: 6.6 10^9/L (ref 4–11)

## 2021-01-01 RX ORDER — POTASSIUM CHLORIDE 1.5 G/1.58G
POWDER, FOR SOLUTION ORAL
Qty: 30 EACH | Refills: 97 | Status: SHIPPED | OUTPATIENT
Start: 2021-01-01

## 2021-01-01 RX ORDER — EMOLLIENT BASE
CREAM (GRAM) TOPICAL
Qty: 453 G | Refills: 97 | Status: SHIPPED | OUTPATIENT
Start: 2021-01-01

## 2021-01-01 RX ORDER — ACETAMINOPHEN 650 MG
TABLET, EXTENDED RELEASE ORAL DAILY
COMMUNITY
End: 2021-01-01

## 2021-01-01 RX ORDER — BISACODYL 10 MG
10 SUPPOSITORY, RECTAL RECTAL DAILY PRN
COMMUNITY

## 2021-01-01 RX ORDER — QUETIAPINE FUMARATE 25 MG/1
12.5 TABLET, FILM COATED ORAL 2 TIMES DAILY PRN
Qty: 30 TABLET | Refills: 3 | Status: SHIPPED | OUTPATIENT
Start: 2021-01-01

## 2021-01-01 RX ORDER — ANORECTAL OINTMENT 15.7; .44; 24; 20.6 G/100G; G/100G; G/100G; G/100G
OINTMENT TOPICAL
Qty: 113 G | Refills: 97 | Status: SHIPPED | OUTPATIENT
Start: 2021-01-01

## 2021-01-01 RX ORDER — ACETAMINOPHEN 650 MG
TABLET, EXTENDED RELEASE ORAL
Qty: 236 ML | Refills: 97 | Status: SHIPPED | OUTPATIENT
Start: 2021-01-01

## 2021-01-01 RX ORDER — ACETAMINOPHEN 325 MG/1
TABLET ORAL
Qty: 112 TABLET | Refills: 11 | Status: SHIPPED | OUTPATIENT
Start: 2021-01-01

## 2021-01-01 RX ORDER — LEVOTHYROXINE SODIUM 75 UG/1
TABLET ORAL
Qty: 14 TABLET | Refills: 97 | Status: SHIPPED | OUTPATIENT
Start: 2021-01-01

## 2021-01-01 ASSESSMENT — MIFFLIN-ST. JEOR
SCORE: 821.85
SCORE: 788.74
SCORE: 780.12
SCORE: 782.38
SCORE: 751.99
SCORE: 803.7
SCORE: 786.47
SCORE: 783.74

## 2021-01-06 NOTE — PROGRESS NOTES
[place] [facility]  Chief Complaint   Patient presents with     Annual Comprehensive Exam Assisted Living     [place] Medical Record Number: [id number]  Place of Service where encounter took place: [facility] [[id number]]    HPI:    April Donnelly is a 89 year old (1/30/1930), who is being seen today for an annual comprehensive visit. HPI information obtained from: facility chart records and facility staff. Today's concerns are:    Seen today for follow up to chronic conditions. No acute concerns from staff. None noted in chart review.     ALLERGIES: Hydrocodone, Lisinopril, Metoprolol, and Terazosin  PAST MEDICAL HISTORY:  has a past medical history of Adjustment disorder with mixed anxiety and depressed mood (12/2/2016), Adjustment disorder with mixed anxiety and depressed mood (12/2/2016), Cerebrovascular accident (H) (12/2/2016), CVA (cerebral vascular accident) (H), Hemorrhagic stroke (H) (12/2/2016), Hypertensive heart disease without heart failure (12/2/2016), Severe dementia (H) (12/2/2016), Syncope (12/2/2016), and Thyroid disease.  PAST SURGICAL HISTORY:  has a past surgical history that includes Thyroidectomy and Open reduction internal fixation hip bipolar (Right, 8/24/2018).  IMMUNIZATIONS:  Immunization History   Administered Date(s) Administered     Influenza (High Dose) 3 valent vaccine 09/26/2017, 10/03/2018, 10/02/2019     Influenza (IIV3) PF 10/12/2016     Influenza, Quad, High Dose, Pf, 65yr + 09/30/2020     Pneumo Conj 13-V (2010&after) 12/06/2016     Pneumococcal 23 valent 11/06/2012     TDAP Vaccine (Boostrix) 10/27/2016     Zoster vaccine, live 10/27/2016     Above immunizations pulled from [place] [facility]. [facility] and facility records also reconciled. Outstanding information sent to  to update [facility] [facility].  Future immunizations are not needed at this point as all recommended immunizations are up to date.     Current Outpatient Medications    Medication Sig Dispense Refill     acetaminophen (TYLENOL) 325 MG tablet TAKE TWO TABLETS (650MG) BY MOUTH TWICE DAILY;AND MAY TAKE TWO TABLETS (650MG) BY MOUTH TWICE DAILY AS NEEDED 112 tablet 97     CALMOSEPTINE 0.44-20.6 % OINT ointment APPLY TOPICALLY TO COCCYX TWICE DAILY;& WITH ALL INCONTINENT CHANGES FOR SKIN ITCH & BREAKDOWN 113 g 97     Emollient (VANICREAM EX) Apply topically 2 times daily to affected areas and PRN       levothyroxine (SYNTHROID/LEVOTHROID) 75 MCG tablet TAKE ONE-HALF TABLET (37.5MCG) BY MOUTH ONCE DAILY 14.5 tablet 97     loperamide (IMODIUM A-D) 2 MG tablet Take 2 mg by mouth 2 times daily as needed for diarrhea       potassium chloride (KLOR-CON) 20 MEQ packet DISSOLVE ONE PACKET (20MEQ) IN LIQUID AND DRINK BY MOUTH ONCE DAILY 30 packet 97     SENEXON-S 8.6-50 MG tablet TAKE TWO TABLETS BY MOUTH TWICE DAILY AS NEEDED FOR CONSTIPATION 100 tablet 97     sennosides (SENOKOT) 8.6 MG tablet TAKE ONE TABLET BY MOUTH THREE TIMES A WEEK 12 tablet PRN     Case Management:  I have reviewed the Assisted Living care plan, current immunizations and preventive care/cancer screening. .Future cancer screening is not clinically indicated secondary to age/goals of care Patient's desire to return to the community is not assessible due to cognitive impairment. Current Level of Care is appropriate.    Advance Directive Discussion:    I reviewed the current advanced directives as reflected in EPIC, the POLST and the facility chart, and verified the congruency of orders. I contacted the first party son Handt and left a message regarding the plan of Care. I did not due to cognitive impairment review the advance directives with the resident.     Team Discussion:  I communicated with the appropriate disciplines involved with the Plan of Care: Nursing    Patient's goal is pain control and comfort. Treat reversible conditions   Information reviewed:  Medications, vital signs, orders, and nursing  "notes.    ROS:  Unobtainable secondary to cognitive impairment.  and Unobtainable secondary to aphasia.     Vitals:  /63   Pulse 78   Temp 96.9  F (36.1  C)   Resp 12   Ht 1.575 m (5' 2\")   Wt 41.5 kg (91 lb 9.6 oz)   SpO2 96%   BMI 16.75 kg/m   Body mass index is 16.75 kg/m .  Exam:  GENERAL APPEARANCE: sleeping   ENT: Refused to open mouth today- [place], palpation of jaw no indications of pain, no redness, swelling   EYES:  Eyes closed  RESP: respiratory effort and palpation of chest- lungs diminished and faint    CV:  Palpation and auscultation of heart done , trace edema, grade 2/6 murmur, irregular rhythm, regular rate  ABDOMEN: soft, nontender, bowel sounds present  M/S: passive ROM to UE and LE with no s/s of pain but stiff to movement   SKIN: unable to examine much of skin today-staff reports at baseline  NEURO: CLAUDIA  PSYCH:  insight and judgement impaired, memory impaired       Lab/Diagnostic data:   CBC RESULTS:   Recent Labs   Lab Test 05/26/20 07/25/19   WBC 6.3 6.0   RBC 4.26 4.41   HGB 12.9 13.7   HCT 40.5 40.7   MCV 95 92   MCH 30.3 31.1   MCHC 31.9 33.7   RDW 13.7 12.9    194       Last Basic Metabolic Panel:  Recent Labs   Lab Test 06/04/20 05/28/20 05/26/20     --  138   POTASSIUM 3.9 3.3* 3.2*   CHLORIDE 108  --  106   BOLA 9.0  --  9.0   CO2 25  --  26   BUN 18  --  20   CR 0.64  --  0.61   GLC 95  --  111*       Liver Function Studies -   Recent Labs   Lab Test 07/25/19 04/26/17   PROTTOTAL 6.6* 6.2*   ALBUMIN 3.5 3.1*   BILITOTAL 0.6 0.4   ALKPHOS 109 123   AST 20 22   ALT 14 18       TSH   Date Value Ref Range Status   07/23/2020 1.91 0.40 - 4.00 mU/L Final   05/26/2020 1.82 0.40 - 4.00 mU/L Final       No results found for: A1C    ASSESSMENT/PLAN  (F01.50) Vascular dementia without behavioral disturbance (H)  (primary encounter diagnosis)  Comment: advanced-sleeping more  Plan:   -eval to hospice when family is agreeable     (E46) Protein-calorie malnutrition, unspecified " severity (H)  Comment: weight lower but stable   Plan:   -continue with mechanical soft and nectar thick liquids     (Z86.73) H/O: CVA (cerebrovascular accident)  (I48.0) PAF (paroxysmal atrial fibrillation) (H)  (I10) Benign essential hypertension  Comment: stable off antihypertensives or rate control with hypotension  Plan:   -VS prn and with acute changes     (E03.9) Acquired hypothyroidism  Comment: stable   Plan:   -TSH annually, levothyroxine 37.5 mcg po daily           Electronically signed by:  HIGINIO Pena CNP

## 2021-01-07 NOTE — LETTER
1/7/2021        RE: April Donnelly  C/o Che Donnelly  [address] [address]        [place] [facility]  Chief Complaint   Patient presents with     Annual Comprehensive Exam Assisted Living     [place] Medical Record Number: [id number]  Place of Service where encounter took place: [facility] [[id number]]    HPI:    April Donnelly is a 89 year old (1/30/1930), who is being seen today for an annual comprehensive visit. HPI information obtained from: facility chart records and facility staff. Today's concerns are:    Seen today for follow up to chronic conditions. No acute concerns from staff. None noted in chart review.     ALLERGIES: Hydrocodone, Lisinopril, Metoprolol, and Terazosin  PAST MEDICAL HISTORY:  has a past medical history of Adjustment disorder with mixed anxiety and depressed mood (12/2/2016), Adjustment disorder with mixed anxiety and depressed mood (12/2/2016), Cerebrovascular accident (H) (12/2/2016), CVA (cerebral vascular accident) (H), Hemorrhagic stroke (H) (12/2/2016), Hypertensive heart disease without heart failure (12/2/2016), Severe dementia (H) (12/2/2016), Syncope (12/2/2016), and Thyroid disease.  PAST SURGICAL HISTORY:  has a past surgical history that includes Thyroidectomy and Open reduction internal fixation hip bipolar (Right, 8/24/2018).  IMMUNIZATIONS:  Immunization History   Administered Date(s) Administered     Influenza (High Dose) 3 valent vaccine 09/26/2017, 10/03/2018, 10/02/2019     Influenza (IIV3) PF 10/12/2016     Influenza, Quad, High Dose, Pf, 65yr + 09/30/2020     Pneumo Conj 13-V (2010&after) 12/06/2016     Pneumococcal 23 valent 11/06/2012     TDAP Vaccine (Boostrix) 10/27/2016     Zoster vaccine, live 10/27/2016     Above immunizations pulled from [facility]. [facility] and facility records also reconciled. Outstanding information sent to  to update [facility].  Future immunizations are not  needed at this point as all recommended immunizations are up to date.     Current Outpatient Medications   Medication Sig Dispense Refill     acetaminophen (TYLENOL) 325 MG tablet TAKE TWO TABLETS (650MG) BY MOUTH TWICE DAILY;AND MAY TAKE TWO TABLETS (650MG) BY MOUTH TWICE DAILY AS NEEDED 112 tablet 97     CALMOSEPTINE 0.44-20.6 % OINT ointment APPLY TOPICALLY TO COCCYX TWICE DAILY;& WITH ALL INCONTINENT CHANGES FOR SKIN ITCH & BREAKDOWN 113 g 97     Emollient (VANICREAM EX) Apply topically 2 times daily to affected areas and PRN       levothyroxine (SYNTHROID/LEVOTHROID) 75 MCG tablet TAKE ONE-HALF TABLET (37.5MCG) BY MOUTH ONCE DAILY 14.5 tablet 97     loperamide (IMODIUM A-D) 2 MG tablet Take 2 mg by mouth 2 times daily as needed for diarrhea       potassium chloride (KLOR-CON) 20 MEQ packet DISSOLVE ONE PACKET (20MEQ) IN LIQUID AND DRINK BY MOUTH ONCE DAILY 30 packet 97     SENEXON-S 8.6-50 MG tablet TAKE TWO TABLETS BY MOUTH TWICE DAILY AS NEEDED FOR CONSTIPATION 100 tablet 97     sennosides (SENOKOT) 8.6 MG tablet TAKE ONE TABLET BY MOUTH THREE TIMES A WEEK 12 tablet PRN     Case Management:  I have reviewed the Assisted Living care plan, current immunizations and preventive care/cancer screening. .Future cancer screening is not clinically indicated secondary to age/goals of care Patient's desire to return to the community is not assessible due to cognitive impairment. Current Level of Care is appropriate.    Advance Directive Discussion:    I reviewed the current advanced directives as reflected in EPIC, the POLST and the facility chart, and verified the congruency of orders. I contacted the first party son Handt and left a message regarding the plan of Care. I did not due to cognitive impairment review the advance directives with the resident.     Team Discussion:  I communicated with the appropriate disciplines involved with the Plan of Care: Nursing    Patient's goal is pain control and comfort. Treat  "reversible conditions   Information reviewed:  Medications, vital signs, orders, and nursing notes.    ROS:  Unobtainable secondary to cognitive impairment.  and Unobtainable secondary to aphasia.     Vitals:  /63   Pulse 78   Temp 96.9  F (36.1  C)   Resp 12   Ht 1.575 m (5' 2\")   Wt 41.5 kg (91 lb 9.6 oz)   SpO2 96%   BMI 16.75 kg/m   Body mass index is 16.75 kg/m .  Exam:  GENERAL APPEARANCE: sleeping   ENT: Refused to open mouth today- [place], palpation of jaw no indications of pain, no redness, swelling   EYES:  Eyes closed  RESP: respiratory effort and palpation of chest- lungs diminished and faint    CV:  Palpation and auscultation of heart done , trace edema, grade 2/6 murmur, irregular rhythm, regular rate  ABDOMEN: soft, nontender, bowel sounds present  M/S: passive ROM to UE and LE with no s/s of pain but stiff to movement   SKIN: unable to examine much of skin today-staff reports at baseline  NEURO: CLAUDIA  PSYCH:  insight and judgement impaired, memory impaired       Lab/Diagnostic data:   CBC RESULTS:   Recent Labs   Lab Test 05/26/20 07/25/19   WBC 6.3 6.0   RBC 4.26 4.41   HGB 12.9 13.7   HCT 40.5 40.7   MCV 95 92   MCH 30.3 31.1   MCHC 31.9 33.7   RDW 13.7 12.9    194       Last Basic Metabolic Panel:  Recent Labs   Lab Test 06/04/20 05/28/20 05/26/20     --  138   POTASSIUM 3.9 3.3* 3.2*   CHLORIDE 108  --  106   BOLA 9.0  --  9.0   CO2 25  --  26   BUN 18  --  20   CR 0.64  --  0.61   GLC 95  --  111*       Liver Function Studies -   Recent Labs   Lab Test 07/25/19 04/26/17   PROTTOTAL 6.6* 6.2*   ALBUMIN 3.5 3.1*   BILITOTAL 0.6 0.4   ALKPHOS 109 123   AST 20 22   ALT 14 18       TSH   Date Value Ref Range Status   07/23/2020 1.91 0.40 - 4.00 mU/L Final   05/26/2020 1.82 0.40 - 4.00 mU/L Final       No results found for: A1C    ASSESSMENT/PLAN  (F01.50) Vascular dementia without behavioral disturbance (H)  (primary encounter diagnosis)  Comment: advanced-sleeping more  Plan: "   -eval to hospice when family is agreeable     (E46) Protein-calorie malnutrition, unspecified severity (H)  Comment: weight lower but stable   Plan:   -continue with mechanical soft and nectar thick liquids     (Z86.73) H/O: CVA (cerebrovascular accident)  (I48.0) PAF (paroxysmal atrial fibrillation) (H)  (I10) Benign essential hypertension  Comment: stable off antihypertensives or rate control with hypotension  Plan:   -VS prn and with acute changes     (E03.9) Acquired hypothyroidism  Comment: stable   Plan:   -TSH annually, levothyroxine 37.5 mcg po daily           Electronically signed by:  HIGINIO Pena CNP             Sincerely,        HIGINIO Pena CNP

## 2021-03-09 NOTE — PROGRESS NOTES
[place] [facility]  [place] Medical Record Number:  [id number]  Place of Service where encounter took place:  [facility] ([facility]) [[id number]]  Chief Complaint   Patient presents with     RECHECK     Routine       HPI:    April Donnelly  is a 89 year old (1/30/1930), who is being seen today for an episodic care visit.  HPI information obtained from: facility chart records, facility staff and [facility] chart review. Today's concern is:    Seen today for recheck of lower extremity edema, blister and open area on digits. Arsenio Hose for lower extremity/pedal edema discontinued 10/13/21 in chart review but still in use at times. Secondary to swelling compression was ill fitting. Has wounds bilateral on feet.Per nursing notes: left 4th toe has intact blister approx. 1.5cm in diameter with fluid present under the skin. Edges of blister are pink, slight swelling present, no warmth upon touch.  Left 2nd toe has open wound approx. 1cm in diameter, unable to assess wound bed d/t slough covering. Wound edges are pink, edematous, no warmth to touch. Left Big toe has reddened area on top/ outer side, no open areas present. Right big toe has reddened area with blister forming in middle approx. 1cm in diameter, edematous, no open areas, no warmth upon touch.   Right 2nd toe has an area approx. 2cm in diameter near nail bed/edge of outer toe that is black, flaking skin/callus appearance, and redden area with dry skin/scab on joint. No warmth with touch. Right 5th toe-reddened area approx. 1cm in diameter with edema, no open area or blisters, no warmth upon touch.     Vascular dementia advanced and some agitation/resistant to cares so prn Seroquel was added 1/21/21. Used 3x last month. Could be contributing to edema. VSS and weight stable.         Past Medical and Surgical History reviewed in Epic today.    MEDICATIONS:    Current Outpatient Medications   Medication Sig Dispense Refill     acetaminophen (TYLENOL) 325 MG  "tablet TAKE TWO TABLETS (650MG) BY MOUTH TWICE DAILY;AND MAY TAKE TWO TABLETS (650MG) BY MOUTH TWICE DAILY AS NEEDED 112 tablet 97     CALMOSEPTINE 0.44-20.6 % OINT ointment APPLY TOPICALLY TO COCCYX TWICE DAILY;& WITH ALL INCONTINENT CHANGES FOR SKIN ITCH & BREAKDOWN 113 g 97     Emollient (VANICREAM EX) Apply topically 2 times daily to affected areas and PRN       levothyroxine (SYNTHROID/LEVOTHROID) 75 MCG tablet TAKE ONE-HALF TABLET (37.5MCG) BY MOUTH ONCE DAILY 14.5 tablet 97     loperamide (IMODIUM A-D) 2 MG tablet Take 2 mg by mouth 2 times daily as needed for diarrhea       potassium chloride (KLOR-CON) 20 MEQ packet DISSOLVE ONE PACKET (20MEQ) IN LIQUID AND DRINK BY MOUTH ONCE DAILY 30 packet 97     QUEtiapine (SEROQUEL) 25 MG tablet Take 0.5 tablets (12.5 mg) by mouth 2 times daily as needed (agitation) 30 tablet 3     SENEXON-S 8.6-50 MG tablet TAKE TWO TABLETS BY MOUTH TWICE DAILY AS NEEDED FOR CONSTIPATION 100 tablet 97     sennosides (SENOKOT) 8.6 MG tablet TAKE ONE TABLET BY MOUTH THREE TIMES A WEEK 12 tablet PRN     REVIEW OF SYSTEMS:  Unobtainable secondary to cognitive impairment.     Objective:  /69   Pulse 78   Temp 97.9  F (36.6  C)   Resp 19   Ht 1.575 m (5' 2\")   Wt 41.4 kg (91 lb 4.8 oz)   SpO2 100%   BMI 16.70 kg/m    Exam:  GENERAL APPEARANCE: sleeping   ENT: Refused to open mouth today- [place], palpation of jaw no indications of pain, no redness, hx of teeth grinding    EYES:  Eyes closed  RESP: respiratory effort and palpation of chest- lungs diminished and faint    CV:  Palpation and auscultation of heart done , trace edema legs, +2 pitting bilateral pedal grade 2/6 murmur, irregular rhythm, regular rate  ABDOMEN: soft, nontender, bowel sounds present  M/S: passive ROM to UE and LE with no s/s of pain but stiff to movement   SKIN: see hpi for toe digits   NEURO: CLAUDIA  PSYCH:  insight and judgement impaired, memory impaired    Labs:   CBC RESULTS:   Recent Labs   Lab Test " 05/26/20 07/25/19   WBC 6.3 6.0   RBC 4.26 4.41   HGB 12.9 13.7   HCT 40.5 40.7   MCV 95 92   MCH 30.3 31.1   MCHC 31.9 33.7   RDW 13.7 12.9    194       Last Basic Metabolic Panel:  Recent Labs   Lab Test 06/04/20 05/28/20 05/26/20     --  138   POTASSIUM 3.9 3.3* 3.2*   CHLORIDE 108  --  106   BOLA 9.0  --  9.0   CO2 25  --  26   BUN 18  --  20   CR 0.64  --  0.61   GLC 95  --  111*       Liver Function Studies -   Recent Labs   Lab Test 07/25/19 04/26/17   PROTTOTAL 6.6* 6.2*   ALBUMIN 3.5 3.1*   BILITOTAL 0.6 0.4   ALKPHOS 109 123   AST 20 22   ALT 14 18       TSH   Date Value Ref Range Status   07/23/2020 1.91 0.40 - 4.00 mU/L Final   05/26/2020 1.82 0.40 - 4.00 mU/L Final       No results found for: A1C    ASSESSMENT/PLAN:  (S91.109A) Non-healing open wound of toe, initial encounter  (primary encounter diagnosis)  (R60.0) Pedal edema  Comment: bilateral wounds  Plan:   -betadine and cover 2nd and 4th toe of left foot daily  -float heels in bed if able   -wound care from homecare and eval for compression   -Bactrim DS 1 tab po BID x 5 days     (F01.50) Vascular dementia without behavioral disturbance (H)  Comment: agitated at times   Plan:   -seroquel prn   -staff assist for all ADLs    (Z86.73) H/O: CVA (cerebrovascular accident)  (I48.0) PAF (paroxysmal atrial fibrillation) (H)  (I10) Benign essential hypertension  Comment: stable off antihypertensives or rate control with hypotension  Plan:   -VS prn and with acute changes      (E03.9) Acquired hypothyroidism  Comment: stable   Plan:   -TSH annually, levothyroxine 37.5 mcg po daily        -BMP,CBC w/diff, TSH, LFTs on next lab day        Electronically signed by:  Kvng Cowart, HIGINIO CNP

## 2021-03-09 NOTE — LETTER
3/9/2021        RE: April Donnelly  [address]o [address]  [address] [address]        [place] [facility]  [place] Medical Record Number:  [id number]  Place of Service where encounter took place:  [facility] ([facility]) [[id number]]  Chief Complaint   Patient presents with     RECHECK     Routine       HPI:    April Donnelly  is a 89 year old (1/30/1930), who is being seen today for an episodic care visit.  HPI information obtained from: facility chart records, facility staff and [facility] chart review. Today's concern is:    Seen today for recheck of lower extremity edema, blister and open area on digits. Arsenio Hose for lower extremity/pedal edema discontinued 10/13/21 in chart review but still in use at times. Secondary to swelling compression was ill fitting. Has wounds bilateral on feet.Per nursing notes: left 4th toe has intact blister approx. 1.5cm in diameter with fluid present under the skin. Edges of blister are pink, slight swelling present, no warmth upon touch.  Left 2nd toe has open wound approx. 1cm in diameter, unable to assess wound bed d/t slough covering. Wound edges are pink, edematous, no warmth to touch. Left Big toe has reddened area on top/ outer side, no open areas present. Right big toe has reddened area with blister forming in middle approx. 1cm in diameter, edematous, no open areas, no warmth upon touch.   Right 2nd toe has an area approx. 2cm in diameter near nail bed/edge of outer toe that is black, flaking skin/callus appearance, and redden area with dry skin/scab on joint. No warmth with touch. Right 5th toe-reddened area approx. 1cm in diameter with edema, no open area or blisters, no warmth upon touch.     Vascular dementia advanced and some agitation/resistant to cares so prn Seroquel was added 1/21/21. Used 3x last month. Could be contributing to edema. VSS and weight stable.         Past Medical and Surgical History reviewed in Epic  "today.    MEDICATIONS:    Current Outpatient Medications   Medication Sig Dispense Refill     acetaminophen (TYLENOL) 325 MG tablet TAKE TWO TABLETS (650MG) BY MOUTH TWICE DAILY;AND MAY TAKE TWO TABLETS (650MG) BY MOUTH TWICE DAILY AS NEEDED 112 tablet 97     CALMOSEPTINE 0.44-20.6 % OINT ointment APPLY TOPICALLY TO COCCYX TWICE DAILY;& WITH ALL INCONTINENT CHANGES FOR SKIN ITCH & BREAKDOWN 113 g 97     Emollient (VANICREAM EX) Apply topically 2 times daily to affected areas and PRN       levothyroxine (SYNTHROID/LEVOTHROID) 75 MCG tablet TAKE ONE-HALF TABLET (37.5MCG) BY MOUTH ONCE DAILY 14.5 tablet 97     loperamide (IMODIUM A-D) 2 MG tablet Take 2 mg by mouth 2 times daily as needed for diarrhea       potassium chloride (KLOR-CON) 20 MEQ packet DISSOLVE ONE PACKET (20MEQ) IN LIQUID AND DRINK BY MOUTH ONCE DAILY 30 packet 97     QUEtiapine (SEROQUEL) 25 MG tablet Take 0.5 tablets (12.5 mg) by mouth 2 times daily as needed (agitation) 30 tablet 3     SENEXON-S 8.6-50 MG tablet TAKE TWO TABLETS BY MOUTH TWICE DAILY AS NEEDED FOR CONSTIPATION 100 tablet 97     sennosides (SENOKOT) 8.6 MG tablet TAKE ONE TABLET BY MOUTH THREE TIMES A WEEK 12 tablet PRN     REVIEW OF SYSTEMS:  Unobtainable secondary to cognitive impairment.     Objective:  /69   Pulse 78   Temp 97.9  F (36.6  C)   Resp 19   Ht 1.575 m (5' 2\")   Wt 41.4 kg (91 lb 4.8 oz)   SpO2 100%   BMI 16.70 kg/m    Exam:  GENERAL APPEARANCE: sleeping   ENT: Refused to open mouth today- [place], palpation of jaw no indications of pain, no redness, hx of teeth grinding    EYES:  Eyes closed  RESP: respiratory effort and palpation of chest- lungs diminished and faint    CV:  Palpation and auscultation of heart done , trace edema legs, +2 pitting bilateral pedal grade 2/6 murmur, irregular rhythm, regular rate  ABDOMEN: soft, nontender, bowel sounds present  M/S: passive ROM to UE and LE with no s/s of pain but stiff to movement   SKIN: see hpi for toe digits "   NEURO: CLAUDIA  PSYCH:  insight and judgement impaired, memory impaired    Labs:   CBC RESULTS:   Recent Labs   Lab Test 05/26/20 07/25/19   WBC 6.3 6.0   RBC 4.26 4.41   HGB 12.9 13.7   HCT 40.5 40.7   MCV 95 92   MCH 30.3 31.1   MCHC 31.9 33.7   RDW 13.7 12.9    194       Last Basic Metabolic Panel:  Recent Labs   Lab Test 06/04/20 05/28/20 05/26/20     --  138   POTASSIUM 3.9 3.3* 3.2*   CHLORIDE 108  --  106   BOLA 9.0  --  9.0   CO2 25  --  26   BUN 18  --  20   CR 0.64  --  0.61   GLC 95  --  111*       Liver Function Studies -   Recent Labs   Lab Test 07/25/19 04/26/17   PROTTOTAL 6.6* 6.2*   ALBUMIN 3.5 3.1*   BILITOTAL 0.6 0.4   ALKPHOS 109 123   AST 20 22   ALT 14 18       TSH   Date Value Ref Range Status   07/23/2020 1.91 0.40 - 4.00 mU/L Final   05/26/2020 1.82 0.40 - 4.00 mU/L Final       No results found for: A1C    ASSESSMENT/PLAN:  (S91.109A) Non-healing open wound of toe, initial encounter  (primary encounter diagnosis)  (R60.0) Pedal edema  Comment: bilateral wounds  Plan:   -betadine and cover 2nd and 4th toe of left foot daily  -float heels in bed if able   -wound care from homecare and eval for compression   -Bactrim DS 1 tab po BID x 5 days     (F01.50) Vascular dementia without behavioral disturbance (H)  Comment: agitated at times   Plan:   -seroquel prn   -staff assist for all ADLs    (Z86.73) H/O: CVA (cerebrovascular accident)  (I48.0) PAF (paroxysmal atrial fibrillation) (H)  (I10) Benign essential hypertension  Comment: stable off antihypertensives or rate control with hypotension  Plan:   -VS prn and with acute changes      (E03.9) Acquired hypothyroidism  Comment: stable   Plan:   -TSH annually, levothyroxine 37.5 mcg po daily        -BMP,CBC w/diff, TSH, LFTs on next lab day        Electronically signed by:  HIGINIO Pena CNP                 Sincerely,        HIGINIO Pena CNP

## 2021-03-16 NOTE — PROGRESS NOTES
[place] [facility]  [place] Medical Record Number:  [id number]  Place of Service where encounter took place:  [facility] ([facility]) [[id number]]  Chief Complaint   Patient presents with     RECHECK       HPI:    April Donnelly  is a 89 year old (1/30/1930), who is being seen today for an episodic care visit.  HPI information obtained from: facility chart records and [facility] chart review. Today's concern is:    Follow up for pedal edema, many toes with various wounds, swelling, redness. Right foot, seconded toe with darkened/black hard scab on center, reddened and swollen, no warmth. Overall slight improvement in appearance from previous visit. Homecare assist for wound care 3x weekly to digits of left foot.    Past Medical and Surgical History reviewed in Epic today.    MEDICATIONS:    Current Outpatient Medications   Medication Sig Dispense Refill     acetaminophen (TYLENOL) 325 MG tablet TAKE TWO TABLETS (650MG) BY MOUTH TWICE DAILY;AND MAY TAKE TWO TABLETS (650MG) BY MOUTH TWICE DAILY AS NEEDED 112 tablet 97     CALMOSEPTINE 0.44-20.6 % OINT ointment APPLY TOPICALLY TO COCCYX TWICE DAILY;& WITH ALL INCONTINENT CHANGES FOR SKIN ITCH & BREAKDOWN 113 g 97     Emollient (VANICREAM EX) Apply topically 2 times daily to affected areas and PRN       levothyroxine (SYNTHROID/LEVOTHROID) 75 MCG tablet TAKE ONE-HALF TABLET (37.5MCG) BY MOUTH ONCE DAILY 14.5 tablet 97     loperamide (IMODIUM A-D) 2 MG tablet Take 2 mg by mouth 2 times daily as needed for diarrhea       potassium chloride (KLOR-CON) 20 MEQ packet DISSOLVE ONE PACKET (20MEQ) IN LIQUID AND DRINK BY MOUTH ONCE DAILY 30 packet 97     QUEtiapine (SEROQUEL) 25 MG tablet Take 0.5 tablets (12.5 mg) by mouth 2 times daily as needed (agitation) 30 tablet 3     SENEXON-S 8.6-50 MG tablet TAKE TWO TABLETS BY MOUTH TWICE DAILY AS NEEDED FOR CONSTIPATION 100 tablet 97     sennosides (SENOKOT) 8.6 MG tablet TAKE ONE TABLET BY MOUTH THREE TIMES A WEEK 12 tablet  "PRN     REVIEW OF SYSTEMS:  Unobtainable secondary to cognitive impairment.     Objective:  /70   Pulse 80   Temp 97.9  F (36.6  C)   Resp 16   Ht 1.575 m (5' 2\")   Wt 45.4 kg (100 lb)   SpO2 98%   BMI 18.29 kg/m    Exam:  GENERAL APPEARANCE: sleeping   ENT: Refused to open mouth today- [place], palpation of jaw no indications of pain, no redness, hx of teeth grinding  is present today   EYES: no redness, drainage   RESP: respiratory effort and palpation of chest- lungs diminished and faint    CV:  Palpation and auscultation of heart done , trace edema legs, +2 pitting bilateral pedal grade 2/6 murmur, irregular rhythm, regular rate  ABDOMEN: soft, nontender, bowel sounds present  M/S: passive ROM to UE and LE with no s/s of pain but stiff to movement   SKIN: see hpi for toe digits   NEURO: CLAUDIA  PSYCH:  insight and judgement impaired, memory impaired       Labs:   CBC RESULTS:   Recent Labs   Lab Test 03/11/21 05/26/20   WBC 6.6 6.3   RBC 4.47 4.26   HGB 13.9 12.9   HCT 43.6 40.5   MCV 98 95   MCH 31.1 30.3   MCHC 31.9 31.9   RDW 12.7 13.7    312       Last Basic Metabolic Panel:  Recent Labs   Lab Test 03/11/21 06/04/20    139   POTASSIUM 3.7 3.9   CHLORIDE 107 108   BOLA 9.4 9.0   CO2 27 25   BUN 17 18   CR 0.81 0.64   GLC 73 95       Liver Function Studies -   Recent Labs   Lab Test 03/11/21 07/25/19   PROTTOTAL 7.0 6.6*   ALBUMIN 3.6 3.5   BILITOTAL 0.4 0.6   ALKPHOS 132 109   AST 22 20   ALT 16 14       TSH   Date Value Ref Range Status   03/11/2021 3.87 0.40 - 4.00 mU/L Final   07/23/2020 1.91 0.40 - 4.00 mU/L Final       No results found for: A1C    ASSESSMENT/PLAN:  (S91.109D) Non-healing open wound of toe, subsequent encounter  (primary encounter diagnosis)  (R60.0) Pedal edema  Comment: bilateral wounds  Plan:   -float heels in bed if able   -wound care from homecare, currently no compression    -Bactrim DS 1 tab po BID x 5 days completed 3/14/21  -x-ray to right digits to rule out " osteomyelitis             Electronically signed by:  HIGINIO Pena CNP

## 2021-03-16 NOTE — LETTER
3/16/2021        RE: April Donnelly  [address]  [address] [address] [facility] [facility] [address]        [place] [facility]  [place] Medical Record Number:  [id number]  Place of Service where encounter took place:  [facility] ([facility]) [[id number]]  Chief Complaint   Patient presents with     RECHECK       HPI:    April Donnelly  is a 89 year old (1/30/1930), who is being seen today for an episodic care visit.  HPI information obtained from: facility chart records and [facility] chart review. Today's concern is:    Follow up for pedal edema, many toes with various wounds, swelling, redness. Right foot, seconded toe with darkened/black hard scab on center, reddened and swollen, no warmth. Overall slight improvement in appearance from previous visit. Homecare assist for wound care 3x weekly to digits of left foot.    Past Medical and Surgical History reviewed in Epic today.    MEDICATIONS:    Current Outpatient Medications   Medication Sig Dispense Refill     acetaminophen (TYLENOL) 325 MG tablet TAKE TWO TABLETS (650MG) BY MOUTH TWICE DAILY;AND MAY TAKE TWO TABLETS (650MG) BY MOUTH TWICE DAILY AS NEEDED 112 tablet 97     CALMOSEPTINE 0.44-20.6 % OINT ointment APPLY TOPICALLY TO COCCYX TWICE DAILY;& WITH ALL INCONTINENT CHANGES FOR SKIN ITCH & BREAKDOWN 113 g 97     Emollient (VANICREAM EX) Apply topically 2 times daily to affected areas and PRN       levothyroxine (SYNTHROID/LEVOTHROID) 75 MCG tablet TAKE ONE-HALF TABLET (37.5MCG) BY MOUTH ONCE DAILY 14.5 tablet 97     loperamide (IMODIUM A-D) 2 MG tablet Take 2 mg by mouth 2 times daily as needed for diarrhea       potassium chloride (KLOR-CON) 20 MEQ packet DISSOLVE ONE PACKET (20MEQ) IN LIQUID AND DRINK BY MOUTH ONCE DAILY 30 packet 97     QUEtiapine (SEROQUEL) 25 MG tablet Take 0.5 tablets (12.5 mg) by mouth 2 times daily as needed (agitation) 30 tablet 3     SENEXON-S 8.6-50 MG tablet TAKE TWO TABLETS BY MOUTH TWICE DAILY AS NEEDED FOR  "CONSTIPATION 100 tablet 97     sennosides (SENOKOT) 8.6 MG tablet TAKE ONE TABLET BY MOUTH THREE TIMES A WEEK 12 tablet PRN     REVIEW OF SYSTEMS:  Unobtainable secondary to cognitive impairment.     Objective:  /70   Pulse 80   Temp 97.9  F (36.6  C)   Resp 16   Ht 1.575 m (5' 2\")   Wt 45.4 kg (100 lb)   SpO2 98%   BMI 18.29 kg/m    Exam:  GENERAL APPEARANCE: sleeping   ENT: Refused to open mouth today- [place], palpation of jaw no indications of pain, no redness, hx of teeth grinding  is present today   EYES: no redness, drainage   RESP: respiratory effort and palpation of chest- lungs diminished and faint    CV:  Palpation and auscultation of heart done , trace edema legs, +2 pitting bilateral pedal grade 2/6 murmur, irregular rhythm, regular rate  ABDOMEN: soft, nontender, bowel sounds present  M/S: passive ROM to UE and LE with no s/s of pain but stiff to movement   SKIN: see hpi for toe digits   NEURO: CLAUDIA  PSYCH:  insight and judgement impaired, memory impaired       Labs:   CBC RESULTS:   Recent Labs   Lab Test 03/11/21 05/26/20   WBC 6.6 6.3   RBC 4.47 4.26   HGB 13.9 12.9   HCT 43.6 40.5   MCV 98 95   MCH 31.1 30.3   MCHC 31.9 31.9   RDW 12.7 13.7    312       Last Basic Metabolic Panel:  Recent Labs   Lab Test 03/11/21 06/04/20    139   POTASSIUM 3.7 3.9   CHLORIDE 107 108   BOLA 9.4 9.0   CO2 27 25   BUN 17 18   CR 0.81 0.64   GLC 73 95       Liver Function Studies -   Recent Labs   Lab Test 03/11/21 07/25/19   PROTTOTAL 7.0 6.6*   ALBUMIN 3.6 3.5   BILITOTAL 0.4 0.6   ALKPHOS 132 109   AST 22 20   ALT 16 14       TSH   Date Value Ref Range Status   03/11/2021 3.87 0.40 - 4.00 mU/L Final   07/23/2020 1.91 0.40 - 4.00 mU/L Final       No results found for: A1C    ASSESSMENT/PLAN:  (S91.109D) Non-healing open wound of toe, subsequent encounter  (primary encounter diagnosis)  (R60.0) Pedal edema  Comment: bilateral wounds  Plan:   -float heels in bed if able   -wound care from homecare, " currently no compression    -Bactrim DS 1 tab po BID x 5 days completed 3/14/21  -x-ray to right digits to rule out osteomyelitis             Electronically signed by:  HIGINIO Pena CNP               Sincerely,        HIGINIO Pena CNP

## 2021-05-13 NOTE — LETTER
5/13/2021        RE: April Donnelly  C/o Handt [address]  [address] [facility] [id number]        [place] [facility]  [place] Medical Record Number:  [id number]  Place of Service where encounter took place:  [facility] ([facility]) [[id number]]  Chief Complaint   Patient presents with     RECHECK     Routine       HPI:    April Donnelly  is a 89 year old (1/30/1930), who is being seen today for an episodic care visit.  HPI information obtained from: facility chart records, facility staff and [facility] chart review. Today's concern is:    With ongoing pedal edema bilateral, redness and non-healing wounds of 2nd toes bilateral questioning vascular disease, infection?. She does seem uncomfortable with wound care but with advanced dementia does resist cares at times. Imaging of right foot 3/16 negative for osteomyelitis. Bactrim DS 3/10 for 5 days with no improvement. No longer ambulates. Wound care was following discharge 4/12 with  recommendations for clinic debridement.     Past Medical and Surgical History reviewed in Epic today.    MEDICATIONS:    Current Outpatient Medications   Medication Sig Dispense Refill     acetaminophen (TYLENOL) 325 MG tablet TAKE TWO TABLETS (650MG) BY MOUTH TWICE DAILY;AND MAY TAKE TWO TABLETS (650MG) BY MOUTH TWICE DAILY AS NEEDED 112 tablet 97     bisacodyl (DULCOLAX) 10 MG suppository Place 10 mg rectally daily as needed for constipation       CALMOSEPTINE 0.44-20.6 % OINT ointment APPLY TOPICALLY TO COCCYX TWICE DAILY;& WITH ALL INCONTINENT CHANGES FOR SKIN ITCH & BREAKDOWN 113 g 97     Emollient (CERAVE) LOTN Externally apply topically At Bedtime Apply to feet       Emollient (VANICREAM EX) Apply topically 2 times daily to affected areas and PRN       levothyroxine (SYNTHROID/LEVOTHROID) 75 MCG tablet TAKE ONE-HALF TABLET (37.5MCG) BY MOUTH ONCE DAILY 14.5 tablet 97     loperamide (IMODIUM A-D) 2 MG tablet Take 2-4 mg by mouth 2 times daily as needed for  "diarrhea Give 4 mg after first loose stool, and 2 mg after each additional loose stool       potassium chloride (KLOR-CON) 20 MEQ packet DISSOLVE ONE PACKET (20MEQ) IN LIQUID AND DRINK BY MOUTH ONCE DAILY 30 packet 97     povidone-iodine (BETADINE) 10 % topical solution Apply topically daily Use as directed on 2nd and 4th toe of L foot       QUEtiapine (SEROQUEL) 25 MG tablet Take 0.5 tablets (12.5 mg) by mouth 2 times daily as needed (agitation) 30 tablet 3     SENEXON-S 8.6-50 MG tablet TAKE TWO TABLETS BY MOUTH TWICE DAILY AS NEEDED FOR CONSTIPATION 100 tablet 97     sennosides (SENOKOT) 8.6 MG tablet TAKE ONE TABLET BY MOUTH THREE TIMES A WEEK 12 tablet PRN     REVIEW OF SYSTEMS:  Unobtainable secondary to cognitive impairment.     Objective:  /79   Pulse 76   Temp 97.7  F (36.5  C)   Resp 15   Ht 1.575 m (5' 2\")   Wt 41.5 kg (91 lb 9.6 oz)   SpO2 96%   BMI 16.75 kg/m    Exam:  GENERAL APPEARANCE: awake, looking around   ENT: Refused to open mouth today- [place], palpation of jaw no indications of pain, no redness, hx of teeth grinding  is present today   EYES: no redness, drainage   RESP: respiratory effort and palpation of chest- lungs diminished and faint    CV:  Palpation and auscultation of heart done , no edema legs, +2 pitting bilateral pedal grade 2/6 murmur, irregular rhythm, regular rate  ABDOMEN: soft, nontender, bowel sounds present  M/S: passive ROM to UE and LE with no s/s of pain but stiff to movement   SKIN: see hpi for toe digits   NEURO: CLAUDIA  PSYCH:  insight and judgement impaired, memory impaired    Labs:   CBC RESULTS:   Recent Labs   Lab Test 03/11/21 05/26/20   WBC 6.6 6.3   RBC 4.47 4.26   HGB 13.9 12.9   HCT 43.6 40.5   MCV 98 95   MCH 31.1 30.3   MCHC 31.9 31.9   RDW 12.7 13.7    312       Last Basic Metabolic Panel:  Recent Labs   Lab Test 03/11/21 06/04/20    139   POTASSIUM 3.7 3.9   CHLORIDE 107 108   BOLA 9.4 9.0   CO2 27 25   BUN 17 18   CR 0.81 0.64   GLC 73 " 95       Liver Function Studies -   Recent Labs   Lab Test 03/11/21 07/25/19   PROTTOTAL 7.0 6.6*   ALBUMIN 3.6 3.5   BILITOTAL 0.4 0.6   ALKPHOS 132 109   AST 22 20   ALT 16 14       TSH   Date Value Ref Range Status   03/11/2021 3.87 0.40 - 4.00 mU/L Final   07/23/2020 1.91 0.40 - 4.00 mU/L Final       No results found for: A1C    ASSESSMENT/PLAN:  (S91.109D) Non-healing open wound of toe, subsequent encounter  (primary encounter diagnosis)  (R60.0) Pedal edema  Comment: ongoing   Plan:   -continue with wound care  -ANNAMARIE with LE arterial duplex   -Venous doppler and duplex bilateral lower extremity  -continue current wound care orders  -hold on Arsenio Saleem          Electronically signed by:  HIGINIO Pena CNP               Sincerely,        HIGINIO Pena CNP

## 2021-05-13 NOTE — PROGRESS NOTES
[place] [facility]  [place] Medical Record Number:  [id number]  Place of Service where encounter took place:  [facility] ([facility]) [[id number]]  Chief Complaint   Patient presents with     RECHECK     Routine       HPI:    April Donnelly  is a 89 year old (1/30/1930), who is being seen today for an episodic care visit.  HPI information obtained from: facility chart records, facility staff and [facility] chart review. Today's concern is:    With ongoing pedal edema bilateral, redness and non-healing wounds of 2nd toes bilateral questioning vascular disease, infection?. She does seem uncomfortable with wound care but with advanced dementia does resist cares at times. Imaging of right foot 3/16 negative for osteomyelitis. Bactrim DS 3/10 for 5 days with no improvement. No longer ambulates. Wound care was following discharge 4/12 with  recommendations for clinic debridement.     Past Medical and Surgical History reviewed in Epic today.    MEDICATIONS:    Current Outpatient Medications   Medication Sig Dispense Refill     acetaminophen (TYLENOL) 325 MG tablet TAKE TWO TABLETS (650MG) BY MOUTH TWICE DAILY;AND MAY TAKE TWO TABLETS (650MG) BY MOUTH TWICE DAILY AS NEEDED 112 tablet 97     bisacodyl (DULCOLAX) 10 MG suppository Place 10 mg rectally daily as needed for constipation       CALMOSEPTINE 0.44-20.6 % OINT ointment APPLY TOPICALLY TO COCCYX TWICE DAILY;& WITH ALL INCONTINENT CHANGES FOR SKIN ITCH & BREAKDOWN 113 g 97     Emollient (CERAVE) LOTN Externally apply topically At Bedtime Apply to feet       Emollient (VANICREAM EX) Apply topically 2 times daily to affected areas and PRN       levothyroxine (SYNTHROID/LEVOTHROID) 75 MCG tablet TAKE ONE-HALF TABLET (37.5MCG) BY MOUTH ONCE DAILY 14.5 tablet 97     loperamide (IMODIUM A-D) 2 MG tablet Take 2-4 mg by mouth 2 times daily as needed for diarrhea Give 4 mg after first loose stool, and 2 mg after each additional loose stool       potassium chloride  "(KLOR-CON) 20 MEQ packet DISSOLVE ONE PACKET (20MEQ) IN LIQUID AND DRINK BY MOUTH ONCE DAILY 30 packet 97     povidone-iodine (BETADINE) 10 % topical solution Apply topically daily Use as directed on 2nd and 4th toe of L foot       QUEtiapine (SEROQUEL) 25 MG tablet Take 0.5 tablets (12.5 mg) by mouth 2 times daily as needed (agitation) 30 tablet 3     SENEXON-S 8.6-50 MG tablet TAKE TWO TABLETS BY MOUTH TWICE DAILY AS NEEDED FOR CONSTIPATION 100 tablet 97     sennosides (SENOKOT) 8.6 MG tablet TAKE ONE TABLET BY MOUTH THREE TIMES A WEEK 12 tablet PRN     REVIEW OF SYSTEMS:  Unobtainable secondary to cognitive impairment.     Objective:  /79   Pulse 76   Temp 97.7  F (36.5  C)   Resp 15   Ht 1.575 m (5' 2\")   Wt 41.5 kg (91 lb 9.6 oz)   SpO2 96%   BMI 16.75 kg/m    Exam:  GENERAL APPEARANCE: awake, looking around   ENT: Refused to open mouth today- [place], palpation of jaw no indications of pain, no redness, hx of teeth grinding  is present today   EYES: no redness, drainage   RESP: respiratory effort and palpation of chest- lungs diminished and faint    CV:  Palpation and auscultation of heart done , no edema legs, +2 pitting bilateral pedal grade 2/6 murmur, irregular rhythm, regular rate  ABDOMEN: soft, nontender, bowel sounds present  M/S: passive ROM to UE and LE with no s/s of pain but stiff to movement   SKIN: see hpi for toe digits   NEURO: CLAUDIA  PSYCH:  insight and judgement impaired, memory impaired    Labs:   CBC RESULTS:   Recent Labs   Lab Test 03/11/21 05/26/20   WBC 6.6 6.3   RBC 4.47 4.26   HGB 13.9 12.9   HCT 43.6 40.5   MCV 98 95   MCH 31.1 30.3   MCHC 31.9 31.9   RDW 12.7 13.7    312       Last Basic Metabolic Panel:  Recent Labs   Lab Test 03/11/21 06/04/20    139   POTASSIUM 3.7 3.9   CHLORIDE 107 108   BOLA 9.4 9.0   CO2 27 25   BUN 17 18   CR 0.81 0.64   GLC 73 95       Liver Function Studies -   Recent Labs   Lab Test 03/11/21 07/25/19   PROTTOTAL 7.0 6.6*   ALBUMIN 3.6 " 3.5   BILITOTAL 0.4 0.6   ALKPHOS 132 109   AST 22 20   ALT 16 14       TSH   Date Value Ref Range Status   03/11/2021 3.87 0.40 - 4.00 mU/L Final   07/23/2020 1.91 0.40 - 4.00 mU/L Final       No results found for: A1C    ASSESSMENT/PLAN:  (S91.109D) Non-healing open wound of toe, subsequent encounter  (primary encounter diagnosis)  (R60.0) Pedal edema  Comment: ongoing   Plan:   -continue with wound care  -ANNAMARIE with LE arterial duplex   -Venous doppler and duplex bilateral lower extremity  -continue current wound care orders  -hold on Arsenio Saleem          Electronically signed by:  HIGINIO Pena CNP

## 2021-05-27 NOTE — PROGRESS NOTES
[place] [facility]  [place] Medical Record Number:  [id number]  Place of Service where encounter took place:  [facility] ([facility]) [[id number]]  Chief Complaint   Patient presents with     RECHECK       HPI:    April Donnelly  is a 89 year old (1/30/1930), who is being seen today for an episodic care visit.  HPI information obtained from: facility chart records and [facility] chart review. Today's concern is:    Non-healing wounds bilateral toes imaging of right foot 3/16 negative for osteomyelitis. Bactrim DS 3/10 for 5 days with no improvement. No longer ambulates. Wound care was following discharge 4/12 with  recommendations for clinic debridement. Toes are swollen, anders in color, cooler, right seconded toe with hardened yellow/brown scab-concern for bone infection underneath. Great toe of right with white discoloration, left seconded toe with yellowish slough hardened of nail. Skin is shiny.      Past Medical and Surgical History reviewed in Epic today.    MEDICATIONS:    Current Outpatient Medications   Medication Sig Dispense Refill     acetaminophen (TYLENOL) 325 MG tablet TAKE TWO TABLETS (650MG) BY MOUTH TWICE DAILY;AND MAY TAKE TWO TABLETS (650MG) BY MOUTH TWICE DAILY AS NEEDED 112 tablet 97     bisacodyl (DULCOLAX) 10 MG suppository Place 10 mg rectally daily as needed for constipation       CALMOSEPTINE 0.44-20.6 % OINT ointment APPLY TOPICALLY TO COCCYX TWICE DAILY;& WITH ALL INCONTINENT CHANGES FOR SKIN ITCH & BREAKDOWN 113 g 97     Emollient (CERAVE) LOTN Externally apply topically At Bedtime Apply to feet       Emollient (VANICREAM EX) Apply topically 2 times daily to affected areas and PRN       levothyroxine (SYNTHROID/LEVOTHROID) 75 MCG tablet TAKE ONE-HALF TABLET (37.5MCG) BY MOUTH ONCE DAILY 14.5 tablet 97     loperamide (IMODIUM A-D) 2 MG tablet Take 2-4 mg by mouth 2 times daily as needed for diarrhea Give 4 mg after first loose stool, and 2 mg after each additional loose stool    "    potassium chloride (KLOR-CON) 20 MEQ packet DISSOLVE ONE PACKET (20MEQ) IN LIQUID AND DRINK BY MOUTH ONCE DAILY 30 packet 97     povidone-iodine (BETADINE) 10 % topical solution Apply topically daily Use as directed on 2nd and 4th toe of L foot       QUEtiapine (SEROQUEL) 25 MG tablet Take 0.5 tablets (12.5 mg) by mouth 2 times daily as needed (agitation) 30 tablet 3     SENEXON-S 8.6-50 MG tablet TAKE TWO TABLETS BY MOUTH TWICE DAILY AS NEEDED FOR CONSTIPATION 100 tablet 97     sennosides (SENOKOT) 8.6 MG tablet TAKE ONE TABLET BY MOUTH THREE TIMES A WEEK 12 tablet PRN     REVIEW OF SYSTEMS:  Unobtainable secondary to cognitive impairment.  and Unobtainable secondary to aphasia.     Objective:  /67   Pulse 84   Temp 96.6  F (35.9  C)   Resp 15   Ht 1.575 m (5' 2\")   Wt 41.8 kg (92 lb 3.2 oz)   SpO2 96%   BMI 16.86 kg/m    Exam:  GENERAL APPEARANCE: awake, looking around   ENT: Refused to open mouth today- [place], palpation of jaw no indications of pain, no redness, hx of teeth grinding  is present today   EYES: no redness, drainage   RESP: respiratory effort and palpation of chest- lungs diminished and faint    CV:  Palpation and auscultation of heart done , no edema legs, +2 pitting bilateral pedal and digit edema grade 2/6 murmur, irregular rhythm, regular rate  ABDOMEN: soft, nontender, bowel sounds present  M/S: passive ROM to UE and LE with no s/s of pain but stiff to movement   SKIN: see hpi for toe digits   NEURO: CLAUDIA  PSYCH:  insight and judgement impaired, memory impaired    Labs:   CBC RESULTS:   Recent Labs   Lab Test 03/11/21 05/26/20   WBC 6.6 6.3   RBC 4.47 4.26   HGB 13.9 12.9   HCT 43.6 40.5   MCV 98 95   MCH 31.1 30.3   MCHC 31.9 31.9   RDW 12.7 13.7    312       Last Basic Metabolic Panel:  Recent Labs   Lab Test 03/11/21 06/04/20    139   POTASSIUM 3.7 3.9   CHLORIDE 107 108   BOLA 9.4 9.0   CO2 27 25   BUN 17 18   CR 0.81 0.64   GLC 73 95       Liver Function Studies - "   Recent Labs   Lab Test 03/11/21 07/25/19   PROTTOTAL 7.0 6.6*   ALBUMIN 3.6 3.5   BILITOTAL 0.4 0.6   ALKPHOS 132 109   AST 22 20   ALT 16 14       TSH   Date Value Ref Range Status   03/11/2021 3.87 0.40 - 4.00 mU/L Final   07/23/2020 1.91 0.40 - 4.00 mU/L Final       No results found for: A1C    ASSESSMENT/PLAN:  (S91.109D) Non-healing open wound of toe, subsequent encounter  (primary encounter diagnosis)  (R60.0) Pedal edema  Comment: ongoing. Evaluation/treatment plan from onsite wound MD (Dr. Lauryn Amor) does not think will heal and progression is likely. ANNAMARIE unable to preform with motion. Negative for DVT. 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 Not candidate for  revascularization   Plan:   -ok for tubi   -continue wound care orders from Dr. Amor  -email to family. Would like a call back to review findings and plan of care          Electronically signed by:  HIGINIO Pena CNP

## 2021-05-27 NOTE — LETTER
5/27/2021        RE: April Donnelly  C/o Handt [address]rzej  [address] [facility] [id number]        [place] [facility]  [place] Medical Record Number:  [id number]  Place of Service where encounter took place:  [facility] ([facility]) [[id number]]  Chief Complaint   Patient presents with     RECHECK       HPI:    April Donnelly  is a 89 year old (1/30/1930), who is being seen today for an episodic care visit.  HPI information obtained from: facility chart records and [facility] chart review. Today's concern is:    Non-healing wounds bilateral toes imaging of right foot 3/16 negative for osteomyelitis. Bactrim DS 3/10 for 5 days with no improvement. No longer ambulates. Wound care was following discharge 4/12 with  recommendations for clinic debridement. Toes are swollen, anders in color, cooler, right seconded toe with hardened yellow/brown scab-concern for bone infection underneath. Great toe of right with white discoloration, left seconded toe with yellowish slough hardened of nail. Skin is shiny.      Past Medical and Surgical History reviewed in Epic today.    MEDICATIONS:    Current Outpatient Medications   Medication Sig Dispense Refill     acetaminophen (TYLENOL) 325 MG tablet TAKE TWO TABLETS (650MG) BY MOUTH TWICE DAILY;AND MAY TAKE TWO TABLETS (650MG) BY MOUTH TWICE DAILY AS NEEDED 112 tablet 97     bisacodyl (DULCOLAX) 10 MG suppository Place 10 mg rectally daily as needed for constipation       CALMOSEPTINE 0.44-20.6 % OINT ointment APPLY TOPICALLY TO COCCYX TWICE DAILY;& WITH ALL INCONTINENT CHANGES FOR SKIN ITCH & BREAKDOWN 113 g 97     Emollient (CERAVE) LOTN Externally apply topically At Bedtime Apply to feet       Emollient (VANICREAM EX) Apply topically 2 times daily to affected areas and PRN       levothyroxine (SYNTHROID/LEVOTHROID) 75 MCG tablet TAKE ONE-HALF TABLET (37.5MCG) BY MOUTH ONCE DAILY 14.5 tablet 97     loperamide (IMODIUM A-D) 2 MG tablet Take 2-4 mg by mouth 2  "times daily as needed for diarrhea Give 4 mg after first loose stool, and 2 mg after each additional loose stool       potassium chloride (KLOR-CON) 20 MEQ packet DISSOLVE ONE PACKET (20MEQ) IN LIQUID AND DRINK BY MOUTH ONCE DAILY 30 packet 97     povidone-iodine (BETADINE) 10 % topical solution Apply topically daily Use as directed on 2nd and 4th toe of L foot       QUEtiapine (SEROQUEL) 25 MG tablet Take 0.5 tablets (12.5 mg) by mouth 2 times daily as needed (agitation) 30 tablet 3     SENEXON-S 8.6-50 MG tablet TAKE TWO TABLETS BY MOUTH TWICE DAILY AS NEEDED FOR CONSTIPATION 100 tablet 97     sennosides (SENOKOT) 8.6 MG tablet TAKE ONE TABLET BY MOUTH THREE TIMES A WEEK 12 tablet PRN     REVIEW OF SYSTEMS:  Unobtainable secondary to cognitive impairment.  and Unobtainable secondary to aphasia.     Objective:  /67   Pulse 84   Temp 96.6  F (35.9  C)   Resp 15   Ht 1.575 m (5' 2\")   Wt 41.8 kg (92 lb 3.2 oz)   SpO2 96%   BMI 16.86 kg/m    Exam:  GENERAL APPEARANCE: awake, looking around   ENT: Refused to open mouth today- [place], palpation of jaw no indications of pain, no redness, hx of teeth grinding  is present today   EYES: no redness, drainage   RESP: respiratory effort and palpation of chest- lungs diminished and faint    CV:  Palpation and auscultation of heart done , no edema legs, +2 pitting bilateral pedal and digit edema grade 2/6 murmur, irregular rhythm, regular rate  ABDOMEN: soft, nontender, bowel sounds present  M/S: passive ROM to UE and LE with no s/s of pain but stiff to movement   SKIN: see hpi for toe digits   NEURO: CLAUDIA  PSYCH:  insight and judgement impaired, memory impaired    Labs:   CBC RESULTS:   Recent Labs   Lab Test 03/11/21 05/26/20   WBC 6.6 6.3   RBC 4.47 4.26   HGB 13.9 12.9   HCT 43.6 40.5   MCV 98 95   MCH 31.1 30.3   MCHC 31.9 31.9   RDW 12.7 13.7    312       Last Basic Metabolic Panel:  Recent Labs   Lab Test 03/11/21 06/04/20    139   POTASSIUM 3.7 3.9 "   CHLORIDE 107 108   BOLA 9.4 9.0   CO2 27 25   BUN 17 18   CR 0.81 0.64   GLC 73 95       Liver Function Studies -   Recent Labs   Lab Test 03/11/21 07/25/19   PROTTOTAL 7.0 6.6*   ALBUMIN 3.6 3.5   BILITOTAL 0.4 0.6   ALKPHOS 132 109   AST 22 20   ALT 16 14       TSH   Date Value Ref Range Status   03/11/2021 3.87 0.40 - 4.00 mU/L Final   07/23/2020 1.91 0.40 - 4.00 mU/L Final       No results found for: A1C    ASSESSMENT/PLAN:  (S91.109D) Non-healing open wound of toe, subsequent encounter  (primary encounter diagnosis)  (R60.0) Pedal edema  Comment: ongoing. Evaluation/treatment plan from onsite wound MD (Dr. Lauryn Amor) does not think will heal and progression is likely. ANNAMARIE unable to preform with motion. Negative for DVT. 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 Not candidate for  revascularization   Plan:   -ok for tubi   -continue wound care orders from Dr. Amor  -email to family. Would like a call back to review findings and plan of care          Electronically signed by:  HIGINIO Pena CNP                  Sincerely,        HIGINIO Pena CNP

## 2021-08-17 NOTE — LETTER
8/17/2021        RE: April Donnelly  C/o Handt [address]  [address] [facility] [address]        [facility]  [place] Medical Record Number:  [id number]  Place of Service where encounter took place:  [facility] ([facility]) [[id number]]  Chief Complaint   Patient presents with     RECHECK     Routine       HPI:    April Donnelly  is a 89 year old (1/30/1930), who is being seen today for an episodic care visit.  HPI information obtained from: facility chart records, facility staff and [facility] chart review. Today's concern is:    Chronic non-healing wounds bilateral toes imaging of right foot 3/16 negative for osteomyelitis. Bactrim DS 3/10 for 5 days with no improvement. No longer ambulates. Wound care was following discharge 4/12 with recommendations for clinic debridement. Onsite wound MD evaluation and treatment with high probability of not healing. Some non-verbal indications of discomfort today with exam. Has scheduled and prn Tylenol. Facility chart review and no noted concerns. Had episode of choking on 8/5 but no further incidents. With advanced dementia and H/O CVA unable to give any hpi.     Past Medical and Surgical History reviewed in Epic today.    MEDICATIONS:    Current Outpatient Medications   Medication Sig Dispense Refill     acetaminophen (TYLENOL) 325 MG tablet TAKE TWO TABLETS (650MG) BY MOUTH TWICE DAILY;AND MAY TAKE TWO TABLETS (650MG) BY MOUTH TWICE DAILY AS NEEDED 112 tablet 97     bisacodyl (DULCOLAX) 10 MG suppository Place 10 mg rectally daily as needed for constipation       CALMOSEPTINE 0.44-20.6 % OINT ointment APPLY TOPICALLY TO COCCYX TWICE DAILY;& WITH ALL INCONTINENT CHANGES FOR SKIN ITCH & BREAKDOWN 113 g 97     emollient (VANICREAM) external cream APPLY TOPICALLY TO AFFECTED AREA(S) TWICE DAILY AND AS NEEDED 453 g 97     levothyroxine (SYNTHROID/LEVOTHROID) 75 MCG tablet TAKE ONE-HALF TABLET (37.5MCG) BY MOUTH ONCE DAILY 14 tablet 97      "loperamide (IMODIUM A-D) 2 MG tablet Take 2-4 mg by mouth 2 times daily as needed for diarrhea Give 4 mg after first loose stool, and 2 mg after each additional loose stool       potassium chloride (KLOR-CON) 20 MEQ packet DISSOLVE ONE PACKET (20MEQ) IN LIQUID AND DRINK BY MOUTH ONCE DAILY 30 each 97     povidone-iodine (BETADINE) 10 % topical solution USE AS DIRECTED TOPICALLY ON 2ND AND 4TH TOE OF LEFT FOOT DAILY 236 mL 97     QUEtiapine (SEROQUEL) 25 MG tablet Take 0.5 tablets (12.5 mg) by mouth 2 times daily as needed (agitation) 30 tablet 3     SENEXON-S 8.6-50 MG tablet TAKE TWO TABLETS BY MOUTH TWICE DAILY AS NEEDED FOR CONSTIPATION 100 tablet 97     sennosides (SENOKOT) 8.6 MG tablet TAKE ONE TABLET BY MOUTH THREE TIMES A WEEK 12 tablet PRN     REVIEW OF SYSTEMS:  Unobtainable secondary to cognitive impairment.     Objective:  BP (!) 141/80   Pulse 85   Temp 97.9  F (36.6  C)   Resp 17   Ht 1.575 m (5' 2\")   Wt 43.5 kg (96 lb)   SpO2 96%   BMI 17.56 kg/m    Exam:  GENERAL APPEARANCE: awake, looking around, laying in bed  ENT: Refused to open mouth today- [place], palpation of jaw no indications of pain, no redness, hx of teeth grinding not present today   EYES: no redness, drainage   RESP: respiratory effort and palpation of chest- lungs diminished and faint, no cough or wheeze    CV:  Palpation and auscultation of heart done , trace edema legs, +2 pitting bilateral pedal and digit edema grade 2/6 murmur, irregular rhythm, regular rate  ABDOMEN: soft, nontender, bowel sounds present  M/S: passive ROM to UE and LE with no s/s of pain but stiff to movement   SKIN: circular wound to 2nd toe left dorsal 0.5cm, no warmth or drainage, right great and 2nd toe dorsal 0.5m wounds, no warmth or drainage-reddened   NEURO: CLAUDIA  PSYCH:  insight and judgement impaired, memory impaired    Labs:   CBC RESULTS: Recent Labs   Lab Test 03/11/21  0000 05/26/20  0000   WBC 6.6 6.3   RBC 4.47 4.26   HGB 13.9 12.9   HCT 43.6 " 40.5   MCV 98 95   MCH 31.1 30.3   MCHC 31.9 31.9   RDW 12.7 13.7    312       Last Basic Metabolic Panel:  Recent Labs   Lab Test 03/11/21  0000 06/04/20  0000    139   POTASSIUM 3.7 3.9   CHLORIDE 107 108   BOLA 9.4 9.0   CO2 27 25   BUN 17 18   CR 0.81 0.64   GLC 73 95       Liver Function Studies -   Recent Labs   Lab Test 03/11/21  0000 07/25/19  0000   PROTTOTAL 7.0 6.6*   ALBUMIN 3.6 3.5   BILITOTAL 0.4 0.6   ALKPHOS 132 109   AST 22 20   ALT 16 14       TSH   Date Value Ref Range Status   03/11/2021 3.87 0.40 - 4.00 mU/L Final   07/23/2020 1.91 0.40 - 4.00 mU/L Final       No results found for: A1C    ASSESSMENT/PLAN:  (S90.282D) Non-healing open wound of toe, subsequent encounter  (primary encounter diagnosis)  (R60.0) Pedal edema  Comment: ongoing and unchanged. Evaluation/treatment plan from onsite wound MD (Dr. Lauryn Amor) does not think will heal and progression is likely. ANNAMARIE unable to preform with motion. Negative for DVT. 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 Not candidate for  revascularization   Plan:   -ok for tubi   -continue wound care orders from Dr. Amor  -email to family. Would like a call back to review findings and plan of care    (R21) Rash and nonspecific skin eruption  Comment: flares at times  Plan:   -vanicream scheduled and prn  -mitts at HS  -calmoseptine with incontinent changes and prn to coccyx    (E87.6) Hypokalemia  Comment: stable  Plan:   -BMP periodically  -supplement daily     (E03.8) Other specified hypothyroidism  Comment: chronic   Plan:   -TSH annually  -levothyroxine 37.5  mcg po daily     (I48.0) Paroxysmal atrial fibrillation (H)  (I10) Benign essential hypertension   Comment: stable   Plan:  -VS and weights from facility  -prior use of antihypertensives     (Z86.73) H/O: CVA (cerebrovascular accident)  Comment: not  anticoagulated 2/2 hx of hemorrhagic stroke and multiple falls  Plan:   -comfort care focus         Electronically signed by:  HIGINIO Pena CNP                 Sincerely,        HIGINIO Pena CNP

## 2021-08-17 NOTE — PROGRESS NOTES
[facility]  [place] Medical Record Number:  [id number]  Place of Service where encounter took place:  [facility] ([facility]) [[id number]]  Chief Complaint   Patient presents with     RECHECK     Routine       HPI:    April Donnelly  is a 89 year old (1/30/1930), who is being seen today for an episodic care visit.  HPI information obtained from: facility chart records, facility staff and [facility] chart review. Today's concern is:    Chronic non-healing wounds bilateral toes imaging of right foot 3/16 negative for osteomyelitis. Bactrim DS 3/10 for 5 days with no improvement. No longer ambulates. Wound care was following discharge 4/12 with recommendations for clinic debridement. Onsite wound MD evaluation and treatment with high probability of not healing. Some non-verbal indications of discomfort today with exam. Has scheduled and prn Tylenol. Facility chart review and no noted concerns. Had episode of choking on 8/5 but no further incidents. With advanced dementia and H/O CVA unable to give any hpi.     Past Medical and Surgical History reviewed in Epic today.    MEDICATIONS:    Current Outpatient Medications   Medication Sig Dispense Refill     acetaminophen (TYLENOL) 325 MG tablet TAKE TWO TABLETS (650MG) BY MOUTH TWICE DAILY;AND MAY TAKE TWO TABLETS (650MG) BY MOUTH TWICE DAILY AS NEEDED 112 tablet 97     bisacodyl (DULCOLAX) 10 MG suppository Place 10 mg rectally daily as needed for constipation       CALMOSEPTINE 0.44-20.6 % OINT ointment APPLY TOPICALLY TO COCCYX TWICE DAILY;& WITH ALL INCONTINENT CHANGES FOR SKIN ITCH & BREAKDOWN 113 g 97     emollient (VANICREAM) external cream APPLY TOPICALLY TO AFFECTED AREA(S) TWICE DAILY AND AS NEEDED 453 g 97     levothyroxine (SYNTHROID/LEVOTHROID) 75 MCG tablet TAKE ONE-HALF TABLET (37.5MCG) BY MOUTH ONCE DAILY 14 tablet 97     loperamide (IMODIUM A-D) 2 MG tablet Take 2-4 mg by mouth 2 times daily as needed for diarrhea Give 4 mg after first  "loose stool, and 2 mg after each additional loose stool       potassium chloride (KLOR-CON) 20 MEQ packet DISSOLVE ONE PACKET (20MEQ) IN LIQUID AND DRINK BY MOUTH ONCE DAILY 30 each 97     povidone-iodine (BETADINE) 10 % topical solution USE AS DIRECTED TOPICALLY ON 2ND AND 4TH TOE OF LEFT FOOT DAILY 236 mL 97     QUEtiapine (SEROQUEL) 25 MG tablet Take 0.5 tablets (12.5 mg) by mouth 2 times daily as needed (agitation) 30 tablet 3     SENEXON-S 8.6-50 MG tablet TAKE TWO TABLETS BY MOUTH TWICE DAILY AS NEEDED FOR CONSTIPATION 100 tablet 97     sennosides (SENOKOT) 8.6 MG tablet TAKE ONE TABLET BY MOUTH THREE TIMES A WEEK 12 tablet PRN     REVIEW OF SYSTEMS:  Unobtainable secondary to cognitive impairment.     Objective:  BP (!) 141/80   Pulse 85   Temp 97.9  F (36.6  C)   Resp 17   Ht 1.575 m (5' 2\")   Wt 43.5 kg (96 lb)   SpO2 96%   BMI 17.56 kg/m    Exam:  GENERAL APPEARANCE: awake, looking around, laying in bed  ENT: Refused to open mouth today- [place], palpation of jaw no indications of pain, no redness, hx of teeth grinding not present today   EYES: no redness, drainage   RESP: respiratory effort and palpation of chest- lungs diminished and faint, no cough or wheeze    CV:  Palpation and auscultation of heart done , trace edema legs, +2 pitting bilateral pedal and digit edema grade 2/6 murmur, irregular rhythm, regular rate  ABDOMEN: soft, nontender, bowel sounds present  M/S: passive ROM to UE and LE with no s/s of pain but stiff to movement   SKIN: circular wound to 2nd toe left dorsal 0.5cm, no warmth or drainage, right great and 2nd toe dorsal 0.5m wounds, no warmth or drainage-reddened   NEURO: CLAUDIA  PSYCH:  insight and judgement impaired, memory impaired    Labs:   CBC RESULTS: Recent Labs   Lab Test 03/11/21  0000 05/26/20  0000   WBC 6.6 6.3   RBC 4.47 4.26   HGB 13.9 12.9   HCT 43.6 40.5   MCV 98 95   MCH 31.1 30.3   MCHC 31.9 31.9   RDW 12.7 13.7    312       Last Basic Metabolic " Panel:  Recent Labs   Lab Test 03/11/21  0000 06/04/20  0000    139   POTASSIUM 3.7 3.9   CHLORIDE 107 108   BOLA 9.4 9.0   CO2 27 25   BUN 17 18   CR 0.81 0.64   GLC 73 95       Liver Function Studies -   Recent Labs   Lab Test 03/11/21  0000 07/25/19  0000   PROTTOTAL 7.0 6.6*   ALBUMIN 3.6 3.5   BILITOTAL 0.4 0.6   ALKPHOS 132 109   AST 22 20   ALT 16 14       TSH   Date Value Ref Range Status   03/11/2021 3.87 0.40 - 4.00 mU/L Final   07/23/2020 1.91 0.40 - 4.00 mU/L Final       No results found for: A1C    ASSESSMENT/PLAN:  (S91.109D) Non-healing open wound of toe, subsequent encounter  (primary encounter diagnosis)  (R60.0) Pedal edema  Comment: ongoing and unchanged. Evaluation/treatment plan from onsite wound MD (Dr. Lauryn Amor) does not think will heal and progression is likely. ANNAMARIE unable to preform with motion. Negative for DVT. 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 Not candidate for  revascularization   Plan:   -ok for tubi   -continue wound care orders from Dr. Amor  -email to family. Would like a call back to review findings and plan of care    (R21) Rash and nonspecific skin eruption  Comment: flares at times  Plan:   -vanicream scheduled and prn  -mitts at HS  -calmoseptine with incontinent changes and prn to coccyx    (E87.6) Hypokalemia  Comment: stable  Plan:   -BMP periodically  -supplement daily     (E03.8) Other specified hypothyroidism  Comment: chronic   Plan:   -TSH annually  -levothyroxine 37.5  mcg po daily     (I48.0) Paroxysmal atrial fibrillation (H)  (I10) Benign essential hypertension   Comment: stable   Plan:  -VS and weights from facility  -prior use of antihypertensives     (Z86.73) H/O: CVA (cerebrovascular accident)  Comment: not anticoagulated 2/2 hx of hemorrhagic stroke and multiple falls  Plan:   -comfort care focus         Electronically  signed by:  HIGINIO Pena CNP

## 2021-09-22 NOTE — LETTER
11/3/2020        RE: April Donnelly  C/o Handt [address]  [address] [facility] [address]        [place] [facility]  [place] Medical Record Number: [id number]  Place of Service where encounter took place: [facility] [[id number]]  Chief Complaint   Patient presents with     Nursing Home Acute       HPI:    April Donnelly is a 89 year old (1/30/1930), who is being seen today for an episodic care visit. HPI information obtained from: facility chart records, facility staff and [facility] chart review. Today's concern is:    Seen today for 2 day history of dry cough and congestion. Febrile at 99.1 on 11/3. Appears flush but no longer with temp. COVID-19 pending and last test was negative 10/27. Unable to swab for influenza as facility unable to obtain. Eating and drinking. With advanced dementia unable to give much history of illness    Past Medical and Surgical History reviewed in Epic today.    MEDICATIONS:    Current Outpatient Medications   Medication Sig Dispense Refill     dextromethorphan (DELSYM) 30 MG/5ML liquid Take 5 mLs (30 mg) by mouth 2 times daily And BID PRN       acetaminophen (TYLENOL) 325 MG tablet TAKE TWO TABLETS (650MG) BY MOUTH TWICE DAILY;AND MAY TAKE TWO TABLETS (650MG) BY MOUTH TWICE DAILY AS NEEDED 112 tablet 97     CALMOSEPTINE 0.44-20.6 % OINT ointment APPLY TOPICALLY TO COCCYX TWICE DAILY;& WITH ALL INCONTINENT CHANGES FOR SKIN ITCH & BREAKDOWN 113 g 97     Emollient (VANICREAM EX) Apply topically 2 times daily to affected areas and PRN       levothyroxine (SYNTHROID/LEVOTHROID) 75 MCG tablet TAKE ONE-HALF TABLET (37.5MCG) BY MOUTH ONCE DAILY 14.5 tablet 97     loperamide (IMODIUM A-D) 2 MG tablet Take 2 mg by mouth 2 times daily as needed for diarrhea       potassium chloride (KLOR-CON) 20 MEQ packet DISSOLVE ONE PACKET (20MEQ) IN LIQUID AND DRINK BY MOUTH ONCE DAILY 30 packet 97     sennosides (SENOKOT) 8.6 MG tablet Take 1 tablet by  "mouth three times a week And daily prn 30 tablet 97     REVIEW OF SYSTEMS:  Unobtainable secondary to cognitive impairment.  and Unobtainable secondary to aphasia.     Objective:  /69   Pulse 70   Temp 97.7  F (36.5  C)   Resp 16   Ht 1.575 m (5' 2\")   Wt 44 kg (97 lb)   SpO2 96%   BMI 17.74 kg/m    Exam:  GENERAL APPEARANCE: calm and quiet  ENT: Refused to open mouth today- [place], palpation of jaw no indications of pain, no redness, swelling   EYES:  EOM, conjunctivae normal,  pupils and irises normal  RESP: respiratory effort and palpation of chest- lungs diminished and faint    CV:  Palpation and auscultation of heart done , trace edema, grade 2/6 murmur, irregular rhythm, regular rate  ABDOMEN: soft, nontender, bowel sounds present  M/S: passive ROM to UE and LE with no s/s of pain  SKIN: unable to examine much of skin today-staff reports at baseline  NEURO: CLAUDIA  PSYCH:  insight and judgement impaired, memory impaired    Labs:   CBC RESULTS:   Recent Labs   Lab Test 05/26/20 07/25/19   WBC 6.3 6.0   RBC 4.26 4.41   HGB 12.9 13.7   HCT 40.5 40.7   MCV 95 92   MCH 30.3 31.1   MCHC 31.9 33.7   RDW 13.7 12.9    194       Last Basic Metabolic Panel:  Recent Labs   Lab Test 06/04/20 05/28/20 05/26/20     --  138   POTASSIUM 3.9 3.3* 3.2*   CHLORIDE 108  --  106   BOLA 9.0  --  9.0   CO2 25  --  26   BUN 18  --  20   CR 0.64  --  0.61   GLC 95  --  111*       Liver Function Studies -   Recent Labs   Lab Test 07/25/19 04/26/17   PROTTOTAL 6.6* 6.2*   ALBUMIN 3.5 3.1*   BILITOTAL 0.6 0.4   ALKPHOS 109 123   AST 20 22   ALT 14 18       TSH   Date Value Ref Range Status   07/23/2020 1.91 0.40 - 4.00 mU/L Final   05/26/2020 1.82 0.40 - 4.00 mU/L Final       No results found for: A1C    ASSESSMENT/PLAN:  {[facility] DX:[id number]}            Electronically signed by:  HIGINIO Pena [facility] Medical Record Number: [id number]  Place of Service where encounter took " place: [facility] ([facility]) [[id number]]  Chief Complaint   Patient presents with     Nursing Home Acute       HPI:    April Donnelly is a 89 year old (1/30/1930), who is being seen today for an episodic care visit. HPI information obtained from: facility chart records, facility staff and [facility] chart review. Today's concern is:    Seen today for 2 day history of dry cough and congestion. Febrile at 99.1 on 11/3. Appears flush but no longer with temp. COVID-19 pending and last test was negative 10/27. Unable to swab for influenza as facility has no swabs onsite (unable to order stock). Eating and drinking at baseline. Appears frail. With advanced dementia unable to give much history of illness. Others on her unit with similar presentation.     Past Medical and Surgical History reviewed in Epic today.    MEDICATIONS:    Current Outpatient Medications   Medication Sig Dispense Refill     dextromethorphan (DELSYM) 30 MG/5ML liquid Take 5 mLs (30 mg) by mouth 2 times daily And BID PRN       acetaminophen (TYLENOL) 325 MG tablet TAKE TWO TABLETS (650MG) BY MOUTH TWICE DAILY;AND MAY TAKE TWO TABLETS (650MG) BY MOUTH TWICE DAILY AS NEEDED 112 tablet 97     CALMOSEPTINE 0.44-20.6 % OINT ointment APPLY TOPICALLY TO COCCYX TWICE DAILY;& WITH ALL INCONTINENT CHANGES FOR SKIN ITCH & BREAKDOWN 113 g 97     Emollient (VANICREAM EX) Apply topically 2 times daily to affected areas and PRN       levothyroxine (SYNTHROID/LEVOTHROID) 75 MCG tablet TAKE ONE-HALF TABLET (37.5MCG) BY MOUTH ONCE DAILY 14.5 tablet 97     loperamide (IMODIUM A-D) 2 MG tablet Take 2 mg by mouth 2 times daily as needed for diarrhea       potassium chloride (KLOR-CON) 20 MEQ packet DISSOLVE ONE PACKET (20MEQ) IN LIQUID AND DRINK BY MOUTH ONCE DAILY 30 packet 97     sennosides (SENOKOT) 8.6 MG tablet Take 1 tablet by mouth three times a week And daily prn 30 tablet 97     REVIEW OF SYSTEMS:  Unobtainable secondary to cognitive impairment.   "and Unobtainable secondary to aphasia.     Objective:  /69   Pulse 70   Temp 97.7  F (36.5  C)   Resp 16   Ht 1.575 m (5' 2\")   Wt 44 kg (97 lb)   SpO2 96%   BMI 17.74 kg/m    Exam:  GENERAL APPEARANCE: calm and quiet, grinding teeth  ENT: Refused to open mouth today- [place], palpation of jaw no indications of pain, no redness, swelling   EYES:  EOM, conjunctivae normal,  pupils and irises normal  RESP: respiratory effort and palpation of chest- lungs diminished and faint    CV:  Palpation and auscultation of heart done , trace edema, grade 2/6 murmur, irregular rhythm, regular rate  ABDOMEN: soft, nontender, bowel sounds present  M/S: passive ROM to UE and LE with no s/s of pain  SKIN: unable to examine much of skin today-staff reports at baseline  NEURO: CLAUDIA  PSYCH:  insight and judgement impaired, memory impaired    Labs:   CBC RESULTS:   Recent Labs   Lab Test 05/26/20 07/25/19   WBC 6.3 6.0   RBC 4.26 4.41   HGB 12.9 13.7   HCT 40.5 40.7   MCV 95 92   MCH 30.3 31.1   MCHC 31.9 33.7   RDW 13.7 12.9    194       Last Basic Metabolic Panel:  Recent Labs   Lab Test 06/04/20 05/28/20 05/26/20     --  138   POTASSIUM 3.9 3.3* 3.2*   CHLORIDE 108  --  106   BOLA 9.0  --  9.0   CO2 25  --  26   BUN 18  --  20   CR 0.64  --  0.61   GLC 95  --  111*       Liver Function Studies -   Recent Labs   Lab Test 07/25/19 04/26/17   PROTTOTAL 6.6* 6.2*   ALBUMIN 3.5 3.1*   BILITOTAL 0.6 0.4   ALKPHOS 109 123   AST 20 22   ALT 14 18       TSH   Date Value Ref Range Status   07/23/2020 1.91 0.40 - 4.00 mU/L Final   05/26/2020 1.82 0.40 - 4.00 mU/L Final       No results found for: A1C    ASSESSMENT/PLAN:  (R05) Cough  (primary encounter diagnosis)  (R53.81) Malaise  Comment: refuses medications at times   Plan:   -dextromethorphan (DELSYM) BID x 7 days  -scheduled and prn Tylenol   -encourage oral intake of fluids to prevent dehydration                   Electronically signed by:  HIGINIO Pena CNP "         Sincerely,        HIGINIO Pena CNP     none

## 2021-09-28 NOTE — LETTER
9/28/2021        RE: April Donnelly  C/o Handt Andrzej  [address] [facility] [address]        [facility]  [place] [facility] Record Number:  [id number]  Place of Service where encounter took place:  [facility] ([facility]) [[id number]]  Chief Complaint   Patient presents with     RECHECK       HPI:    April Donnelly  is a 89 year old (1/30/1930), who is being seen today for an episodic care visit.  HPI information obtained from: facility chart records, facility staff and [facility] chart review. Today's concern is:    Ongoing decline, poor oral intake, non-healing wounds on toes.     Seen in her room today and does not appear in acute pain but was fidgeting with toe/foot exam. History of chronic  non-healing wounds bilateral toes imaging of right foot 3/16 negative for osteomyelitis. Bactrim DS 3/10 for 5 days with no improvement. No longer ambulates. Wound care was following discharge 4/12 with recommendations for clinic debridement. Onsite wound MD evaluation and treatment with high probability of not healing. ANNAMARIE unable to perform with motion. Negative for DVT. 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 Not candidate for revascularization.    Staff reporting more difficulty encouraging her to eat and drink. She is refusing most food and fluids at breakfast and lunch. Pills are crushed/put into a cookie but not consistently swallowing. Slow ongoing loss of weight.     Past Medical and Surgical History reviewed in Epic today.    MEDICATIONS:    Current Outpatient Medications   Medication Sig Dispense Refill     acetaminophen (TYLENOL) 325 MG tablet TAKE TWO TABLETS (650MG) BY MOUTH TWICE DAILY;AND MAY TAKE TWO TABLETS (650MG) BY MOUTH TWICE DAILY AS NEEDED 112 tablet 11     bisacodyl (DULCOLAX) 10 MG suppository Place 10 mg rectally daily as needed  "for constipation       CALMOSEPTINE 0.44-20.6 % OINT ointment APPLY TOPICALLY TO COCCYX TWICE DAILY;& WITH ALL INCONTINENT CHANGES FOR SKIN ITCH & BREAKDOWN 113 g 97     emollient (VANICREAM) external cream APPLY TOPICALLY TO AFFECTED AREA(S) TWICE DAILY AND AS NEEDED 453 g 97     levothyroxine (SYNTHROID/LEVOTHROID) 75 MCG tablet TAKE ONE-HALF TABLET (37.5MCG) BY MOUTH ONCE DAILY 14 tablet 97     loperamide (IMODIUM A-D) 2 MG tablet Take 2-4 mg by mouth 2 times daily as needed for diarrhea Give 4 mg after first loose stool, and 2 mg after each additional loose stool       potassium chloride (KLOR-CON) 20 MEQ packet DISSOLVE ONE PACKET (20MEQ) IN LIQUID AND DRINK BY MOUTH ONCE DAILY 30 each 97     povidone-iodine (BETADINE) 10 % topical solution USE AS DIRECTED TOPICALLY ON 2ND AND 4TH TOE OF LEFT FOOT DAILY 236 mL 97     QUEtiapine (SEROQUEL) 25 MG tablet Take 0.5 tablets (12.5 mg) by mouth 2 times daily as needed (agitation) 30 tablet 3     SENEXON-S 8.6-50 MG tablet TAKE TWO TABLETS BY MOUTH TWICE DAILY AS NEEDED FOR CONSTIPATION 100 tablet 97     sennosides (SENOKOT) 8.6 MG tablet TAKE ONE TABLET BY MOUTH THREE TIMES A WEEK 12 tablet PRN     REVIEW OF SYSTEMS:  Unobtainable secondary to cognitive impairment.  and Unobtainable secondary to aphasia.     Objective:  BP (!) 149/79   Pulse (!) 49   Temp 97.8  F (36.6  C)   Resp 18   Ht 1.575 m (5' 2\")   Wt 41.2 kg (90 lb 12.8 oz)   SpO2 98%   BMI 16.61 kg/m    Exam:  GENERAL APPEARANCE: awake, looking around, laying in bed  ENT: Refused to open mouth today- [place], palpation of jaw no indications of pain, no redness, hx of teeth grinding not present today-missing front upper teeth   EYES: no redness, drainage   RESP: respiratory effort and palpation of chest- lungs diminished and faint, no cough or wheeze    CV:  Palpation and auscultation of heart done , trace edema legs, pitting 2+ pedal to left,  Bilateral toes with 2+ edema, 2/6 murmur, irregular rhythm, " regular rate  ABDOMEN: soft, nontender, bowel sounds present  M/S: passive ROM to UE and LE with no s/s of pain but stiff to movement   SKIN: various ulcers to dorsal toes bilateral, various moist areas between digits, hardened scabs, slough,reddened no warmth, no drainage  NEURO: CLAUDIA  PSYCH:  insight and judgement impaired, memory impaired    Labs:   CBC RESULTS: Recent Labs   Lab Test 03/11/21  0000 05/26/20  0000   WBC 6.6 6.3   RBC 4.47 4.26   HGB 13.9 12.9   HCT 43.6 40.5   MCV 98 95   MCH 31.1 30.3   MCHC 31.9 31.9   RDW 12.7 13.7    312       Last Basic Metabolic Panel:  Recent Labs   Lab Test 03/11/21  0000 06/04/20  0000    139   POTASSIUM 3.7 3.9   CHLORIDE 107 108   BOLA 9.4 9.0   CO2 27 25   BUN 17 18   CR 0.81 0.64   GLC 73 95       Liver Function Studies -   Recent Labs   Lab Test 03/11/21  0000 07/25/19  0000   PROTTOTAL 7.0 6.6*   ALBUMIN 3.6 3.5   BILITOTAL 0.4 0.6   ALKPHOS 132 109   AST 22 20   ALT 16 14       TSH   Date Value Ref Range Status   03/11/2021 3.87 0.40 - 4.00 mU/L Final   07/23/2020 1.91 0.40 - 4.00 mU/L Final       No results found for: A1C    ASSESSMENT/PLAN:  (F01.50) Vascular dementia without behavioral disturbance (H)  (primary encounter diagnosis)  Comment: progressing- loss of language and mobility, weight loss  Plan:   -staff assist with all ADLs  -eval to hospice     (Z86.73) H/O: CVA (cerebrovascular accident)  Comment: not anticoagulated 2/2 hx of hemorrhagic stroke and falls  Plan:   -comfort care focus    (S91.109D) Non-healing open wound of toe, subsequent encounter  (R60.0) Pedal edema  Comment: progressing   Plan:   -reduce wound cares to three times weekly   -tylenol scheduled and prn     (R53.81) Declining functional status  Comment: ongoing-previously on hospice   Plan:   -family ok with eval to hospice         Electronically signed by:  HIGINIO Pena CNP                 Sincerely,        HIGINIO Pena CNP

## 2021-09-28 NOTE — PROGRESS NOTES
[facility]  [place] [facility] Record Number:  [id number]  Place of Service where encounter took place:  [facility] ([facility]) [[id number]]  Chief Complaint   Patient presents with     RECHECK       HPI:    April Donnelly  is a 89 year old (1/30/1930), who is being seen today for an episodic care visit.  HPI information obtained from: facility chart records, facility staff and [facility] chart review. Today's concern is:    Ongoing decline, poor oral intake, non-healing wounds on toes.     Seen in her room today and does not appear in acute pain but was fidgeting with toe/foot exam. History of chronic  non-healing wounds bilateral toes imaging of right foot 3/16 negative for osteomyelitis. Bactrim DS 3/10 for 5 days with no improvement. No longer ambulates. Wound care was following discharge 4/12 with recommendations for clinic debridement. Onsite wound MD evaluation and treatment with high probability of not healing. ANNAMARIE unable to perform with motion. Negative for DVT. 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 Not candidate for revascularization.    Staff reporting more difficulty encouraging her to eat and drink. She is refusing most food and fluids at breakfast and lunch. Pills are crushed/put into a cookie but not consistently swallowing. Slow ongoing loss of weight.     Past Medical and Surgical History reviewed in Epic today.    MEDICATIONS:    Current Outpatient Medications   Medication Sig Dispense Refill     acetaminophen (TYLENOL) 325 MG tablet TAKE TWO TABLETS (650MG) BY MOUTH TWICE DAILY;AND MAY TAKE TWO TABLETS (650MG) BY MOUTH TWICE DAILY AS NEEDED 112 tablet 11     bisacodyl (DULCOLAX) 10 MG suppository Place 10 mg rectally daily as needed for constipation       CALMOSEPTINE 0.44-20.6 % OINT ointment APPLY TOPICALLY TO COCCYX TWICE DAILY;& WITH ALL  "INCONTINENT CHANGES FOR SKIN ITCH & BREAKDOWN 113 g 97     emollient (VANICREAM) external cream APPLY TOPICALLY TO AFFECTED AREA(S) TWICE DAILY AND AS NEEDED 453 g 97     levothyroxine (SYNTHROID/LEVOTHROID) 75 MCG tablet TAKE ONE-HALF TABLET (37.5MCG) BY MOUTH ONCE DAILY 14 tablet 97     loperamide (IMODIUM A-D) 2 MG tablet Take 2-4 mg by mouth 2 times daily as needed for diarrhea Give 4 mg after first loose stool, and 2 mg after each additional loose stool       potassium chloride (KLOR-CON) 20 MEQ packet DISSOLVE ONE PACKET (20MEQ) IN LIQUID AND DRINK BY MOUTH ONCE DAILY 30 each 97     povidone-iodine (BETADINE) 10 % topical solution USE AS DIRECTED TOPICALLY ON 2ND AND 4TH TOE OF LEFT FOOT DAILY 236 mL 97     QUEtiapine (SEROQUEL) 25 MG tablet Take 0.5 tablets (12.5 mg) by mouth 2 times daily as needed (agitation) 30 tablet 3     SENEXON-S 8.6-50 MG tablet TAKE TWO TABLETS BY MOUTH TWICE DAILY AS NEEDED FOR CONSTIPATION 100 tablet 97     sennosides (SENOKOT) 8.6 MG tablet TAKE ONE TABLET BY MOUTH THREE TIMES A WEEK 12 tablet PRN     REVIEW OF SYSTEMS:  Unobtainable secondary to cognitive impairment.  and Unobtainable secondary to aphasia.     Objective:  BP (!) 149/79   Pulse (!) 49   Temp 97.8  F (36.6  C)   Resp 18   Ht 1.575 m (5' 2\")   Wt 41.2 kg (90 lb 12.8 oz)   SpO2 98%   BMI 16.61 kg/m    Exam:  GENERAL APPEARANCE: awake, looking around, laying in bed  ENT: Refused to open mouth today- [place], palpation of jaw no indications of pain, no redness, hx of teeth grinding not present today-missing front upper teeth   EYES: no redness, drainage   RESP: respiratory effort and palpation of chest- lungs diminished and faint, no cough or wheeze    CV:  Palpation and auscultation of heart done , trace edema legs, pitting 2+ pedal to left,  Bilateral toes with 2+ edema, 2/6 murmur, irregular rhythm, regular rate  ABDOMEN: soft, nontender, bowel sounds present  M/S: passive ROM to UE and LE with no s/s of pain but " stiff to movement   SKIN: various ulcers to dorsal toes bilateral, various moist areas between digits, hardened scabs, slough,reddened no warmth, no drainage  NEURO: CLAUDIA  PSYCH:  insight and judgement impaired, memory impaired    Labs:   CBC RESULTS: Recent Labs   Lab Test 03/11/21  0000 05/26/20  0000   WBC 6.6 6.3   RBC 4.47 4.26   HGB 13.9 12.9   HCT 43.6 40.5   MCV 98 95   MCH 31.1 30.3   MCHC 31.9 31.9   RDW 12.7 13.7    312       Last Basic Metabolic Panel:  Recent Labs   Lab Test 03/11/21  0000 06/04/20  0000    139   POTASSIUM 3.7 3.9   CHLORIDE 107 108   BOLA 9.4 9.0   CO2 27 25   BUN 17 18   CR 0.81 0.64   GLC 73 95       Liver Function Studies -   Recent Labs   Lab Test 03/11/21  0000 07/25/19  0000   PROTTOTAL 7.0 6.6*   ALBUMIN 3.6 3.5   BILITOTAL 0.4 0.6   ALKPHOS 132 109   AST 22 20   ALT 16 14       TSH   Date Value Ref Range Status   03/11/2021 3.87 0.40 - 4.00 mU/L Final   07/23/2020 1.91 0.40 - 4.00 mU/L Final       No results found for: A1C    ASSESSMENT/PLAN:  (F01.50) Vascular dementia without behavioral disturbance (H)  (primary encounter diagnosis)  Comment: progressing- loss of language and mobility, weight loss  Plan:   -staff assist with all ADLs  -eval to hospice     (Z86.73) H/O: CVA (cerebrovascular accident)  Comment: not anticoagulated 2/2 hx of hemorrhagic stroke and falls  Plan:   -comfort care focus    (S91.109D) Non-healing open wound of toe, subsequent encounter  (R60.0) Pedal edema  Comment: progressing   Plan:   -reduce wound cares to three times weekly   -tylenol scheduled and prn     (R53.81) Declining functional status  Comment: ongoing-previously on hospice   Plan:   -family ok with eval to hospice         Electronically signed by:  Kvng Cowart, HIGINIO SIMMONS

## 2021-10-19 NOTE — LETTER
10/19/2021        RE: April Donnelly  C/o Che Donnelly  [address] [facility] [address]        April Donnelly is a 89 year old female seen October 19, 2021 at [facility] unit where she has resided for 5 years (admit 11/2016) seen for end of life care.   Pt is seen in her room, lying abed and no response to voice.   She appears fairly comfortable, MS dose was increased yesterday.   Has ischemic toes that are clearly painful when touched.   Her son Che and daughter-in-law Vani are present, as well as Hospice nurse, and questions answered.       Patient has had multiple falls, and suffered acetabular and hip fracture in 2017.  She had another fall in the DR here in August 2018, and suffered a proximal right femur fracture for which she underwent a right bipolar hemiarthroplasty.  She has not ambulated since then.    Transfers with EZ stand and assist of two.   Had another fall in May 2019 and seen in ED, without significant injury.  She also had atrial fib recognized during hospitalization, with unremarkable ECHO.   She is not anticoagulated secondary to h/o hemorrhagic stroke and multiple falls.       Past Medical History:   Diagnosis Date     Adjustment disorder with mixed anxiety and depressed mood 12/2/2016     Adjustment disorder with mixed anxiety and depressed mood 12/2/2016     Cerebrovascular accident (H) 12/2/2016     CVA (cerebral vascular accident) (H)      Hemorrhagic stroke (H) 12/2/2016     Hypertensive heart disease without heart failure 12/2/2016     Severe dementia 12/2/2016     Syncope 12/2/2016     Thyroid disease     Atrial fibrillation   Multiple falls  Acetabular hip fracture, 2017  Right proximal femur fracture, 2018      SH:   .      She has 3 sons Woody [place] and Reji.       ROS:    Wt Readings from Last 5 Encounters:   10/19/21 38.4 kg (84 lb 9.6 oz)   09/28/21 41.2 kg (90 lb 12.8 oz)   08/17/21 43.5 kg (96 lb)   05/27/21 41.8 kg (92 lb 3.2 oz)  "  05/13/21 41.5 kg (91 lb 9.6 oz)      EXAM: frail  /84   Pulse 99   Temp 97.8  F (36.6  C)   Resp 20   Ht 1.575 m (5' 2\")   Wt 38.4 kg (84 lb 9.6 oz)   SpO2 98%   BMI 15.47 kg/m     Neck supple without adenopathy  Lungs with decreased BS, no rales or wheeze  Heart RRR s1s2, no ectopy or murmur  Abd soft, NT, no distention, +BS  Ext without edema.  Necrotic toes, dry gangrene.  No streaking or warmth.   Neuro: mostly unresponsive    Labs and imaging reviewed      IMP/PLAN:   (I73.9) PVD (peripheral vascular disease) (H)   (I96) Dry gangrene (H)  Comment: no revascularization options   Plan: followed by in house Wound physician for wound care.         (I48.0) Paroxysmal atrial fibrillation (H)    Comment: not on rate slowing medications.     Pulse Readings from Last 4 Encounters:   10/19/21 99   09/28/21 (!) 49   08/17/21 85   05/27/21 84      Plan: Not anticoagulated secondary to h/o hemorrhagic stroke and multiple falls with injury.      (Z86.73) H/O: CVA (cerebrovascular accident)  (I69.359) Hemiparesis following cerebrovascular accident (CVA) (H)  Comment: limited mobility     Plan: assist with transfers and all mobility   No preventative tx given goals of care.       (I10) Benign essential hypertension  Comment:   BP Readings from Last 3 Encounters:   10/19/21 132/84   09/28/21 (!) 149/79   08/17/21 (!) 141/80     Plan: no longer on anti-hypertensives    (F01.50) Vascular dementia without behavioral disturbance  (R63.4) Weight loss  (R53.81) Declining functional status  Comment: with loss of language and mobility  Plan: [facility] unit for meds, meals and cares    (E03.9) Acquired hypothyroidism  Comment:   TSH   Date Value Ref Range Status   03/11/2021 3.87 0.40 - 4.00 mU/L Final    Plan: levothyroxine 75 mcg/day; yearly TSH     (Z51.5) Hospice care patient  Comment: scheduled and prn medications for comfort reviewed with family and Hospice nurse   Plan: No longer taking much po, expect her to " succumb to her illnesses in near future.      Teresa Muse MD           Sincerely,        Teresa Muse MD

## 2021-10-28 NOTE — PROGRESS NOTES
April Donnelly is a 89 year old female seen October 19, 2021 at [facility] Care unit where she has resided for 5 years (admit 11/2016) seen for end of life care.   Pt is seen in her room, lying abed and no response to voice.   She appears fairly comfortable, MS dose was increased yesterday.   Has ischemic toes that are clearly painful when touched.   Her son Che and daughter-in-law Vani are present, as well as Hospice nurse, and questions answered.       Patient has had multiple falls, and suffered acetabular and hip fracture in 2017.  She had another fall in the DR here in August 2018, and suffered a proximal right femur fracture for which she underwent a right bipolar hemiarthroplasty.  She has not ambulated since then.    Transfers with EZ stand and assist of two.   Had another fall in May 2019 and seen in ED, without significant injury.  She also had atrial fib recognized during hospitalization, with unremarkable ECHO.   She is not anticoagulated secondary to h/o hemorrhagic stroke and multiple falls.       Past Medical History:   Diagnosis Date     Adjustment disorder with mixed anxiety and depressed mood 12/2/2016     Adjustment disorder with mixed anxiety and depressed mood 12/2/2016     Cerebrovascular accident (H) 12/2/2016     CVA (cerebral vascular accident) (H)      Hemorrhagic stroke (H) 12/2/2016     Hypertensive heart disease without heart failure 12/2/2016     Severe dementia 12/2/2016     Syncope 12/2/2016     Thyroid disease     Atrial fibrillation   Multiple falls  Acetabular hip fracture, 2017  Right proximal femur fracture, 2018      SH:   .      She has 3 sons Che, Woody and Reji.       ROS:    Wt Readings from Last 5 Encounters:   10/19/21 38.4 kg (84 lb 9.6 oz)   09/28/21 41.2 kg (90 lb 12.8 oz)   08/17/21 43.5 kg (96 lb)   05/27/21 41.8 kg (92 lb 3.2 oz)   05/13/21 41.5 kg (91 lb 9.6 oz)      EXAM: frail  /84   Pulse 99   Temp 97.8  F (36.6  C)   Resp 20   Ht  "1.575 m (5' 2\")   Wt 38.4 kg (84 lb 9.6 oz)   SpO2 98%   BMI 15.47 kg/m     Neck supple without adenopathy  Lungs with decreased BS, no rales or wheeze  Heart RRR s1s2, no ectopy or murmur  Abd soft, NT, no distention, +BS  Ext without edema.  Necrotic toes, dry gangrene.  No streaking or warmth.   Neuro: mostly unresponsive    Labs and imaging reviewed      IMP/PLAN:   (I73.9) PVD (peripheral vascular disease) (H)   (I96) Dry gangrene (H)  Comment: no revascularization options   Plan: followed by in house Wound physician for wound care.         (I48.0) Paroxysmal atrial fibrillation (H)    Comment: not on rate slowing medications.     Pulse Readings from Last 4 Encounters:   10/19/21 99   09/28/21 (!) 49   08/17/21 85   05/27/21 84      Plan: Not anticoagulated secondary to h/o hemorrhagic stroke and multiple falls with injury.      (Z86.73) H/O: CVA (cerebrovascular accident)  (I69.359) Hemiparesis following cerebrovascular accident (CVA) (H)  Comment: limited mobility     Plan: assist with transfers and all mobility   No preventative tx given goals of care.       (I10) Benign essential hypertension  Comment:   BP Readings from Last 3 Encounters:   10/19/21 132/84   09/28/21 (!) 149/79   08/17/21 (!) 141/80     Plan: no longer on anti-hypertensives    (F01.50) Vascular dementia without behavioral disturbance  (R63.4) Weight loss  (R53.81) Declining functional status  Comment: with loss of language and mobility  Plan: [facility] for meds, meals and cares    (E03.9) Acquired hypothyroidism  Comment:   TSH   Date Value Ref Range Status   03/11/2021 3.87 0.40 - 4.00 mU/L Final    Plan: levothyroxine 75 mcg/day; yearly TSH     (Z51.5) Hospice care patient  Comment: scheduled and prn medications for comfort reviewed with family and Hospice nurse   Plan: No longer taking much po, expect her to succumb to her illnesses in near future.      Teresa Muse MD     "

## (undated) DEVICE — LINEN FULL SHEET 5511

## (undated) DEVICE — CLEANSER WOUND IRRISEPT 0.05% CHG IRRISEPT-403

## (undated) DEVICE — SOL NACL 0.9% IRRIG 1000ML BOTTLE 07138-09

## (undated) DEVICE — BLADE SAW SAGITTAL STRK 25X90X1.27MM HD SYS 6 6125-127-090

## (undated) DEVICE — LINEN HALF SHEET 5512

## (undated) DEVICE — BAG CLEAR TRASH 1.3M 39X33" P4040C

## (undated) DEVICE — BONE CLEANING TIP INTERPULSE FEMORAL CANAL 0210-008-000

## (undated) DEVICE — GLOVE PROTEXIS POWDER FREE 7.5 ORTHOPEDIC 2D73ET75

## (undated) DEVICE — SU ETHIBOND 5 V-37 4X30" MB66G

## (undated) DEVICE — LINEN ORTHO ACL PACK 5447

## (undated) DEVICE — SET HANDPIECE INTERPULSE W/COAXIAL FAN SPRAY TIP 0210118000

## (undated) DEVICE — SOL NACL 0.9% IRRIG 3000ML BAG 2B7477

## (undated) DEVICE — DRSG AQUACEL AG 3.5X9.75" HYDROFIBER 412011

## (undated) DEVICE — GLOVE PROTEXIS BLUE W/NEU-THERA 8.0  2D73EB80

## (undated) DEVICE — SUTURE MONOCRYL+ 2-0 CT-1 36" UNDYED MCP945H

## (undated) DEVICE — IMM PILLOW ABDUCT HIP MED M60-025-M

## (undated) DEVICE — PREP CHLORAPREP 26ML TINTED ORANGE  260815

## (undated) DEVICE — DRAIN HEMOVAC RESERVOIR KIT 10FR 1/8" MED 00-2550-002-10

## (undated) DEVICE — PACK TOTAL HIP RIDGES LATEX PO15HIFSG

## (undated) DEVICE — LINEN DRAPE 54X72" 5467

## (undated) DEVICE — SU VICRYL 0 CT-1 36" J346H

## (undated) DEVICE — SUCTION MANIFOLD NEPTUNE 2 SYS 4 PORT 0702-020-000

## (undated) DEVICE — GLOVE PROTEXIS POWDER FREE SMT 8.0  2D72PT80X

## (undated) DEVICE — HOOD FLYTE W/PEELAWAY 408-800-100

## (undated) DEVICE — GLOVE PROTEXIS BLUE W/NEU-THERA 7.0  2D73EB70

## (undated) DEVICE — SOL WATER IRRIG 1000ML BOTTLE 2F7114

## (undated) DEVICE — GLOVE PROTEXIS POWDER FREE 7.0 ORTHOPEDIC 2D73ET70

## (undated) DEVICE — SUTURE VICRYL+ 0 CT-1 18" DYED VIO VCP740D

## (undated) RX ORDER — HYDRALAZINE HYDROCHLORIDE 20 MG/ML
INJECTION INTRAMUSCULAR; INTRAVENOUS
Status: DISPENSED
Start: 2018-08-24

## (undated) RX ORDER — FENTANYL CITRATE 50 UG/ML
INJECTION, SOLUTION INTRAMUSCULAR; INTRAVENOUS
Status: DISPENSED
Start: 2018-08-24

## (undated) RX ORDER — LABETALOL HYDROCHLORIDE 5 MG/ML
INJECTION, SOLUTION INTRAVENOUS
Status: DISPENSED
Start: 2018-08-24

## (undated) RX ORDER — KETAMINE HCL IN 0.9 % NACL 50 MG/5 ML
SYRINGE (ML) INTRAVENOUS
Status: DISPENSED
Start: 2018-08-24

## (undated) RX ORDER — CEFAZOLIN SODIUM 2 G/100ML
INJECTION, SOLUTION INTRAVENOUS
Status: DISPENSED
Start: 2018-08-24